# Patient Record
Sex: FEMALE | Race: WHITE | NOT HISPANIC OR LATINO | Employment: OTHER | URBAN - NONMETROPOLITAN AREA
[De-identification: names, ages, dates, MRNs, and addresses within clinical notes are randomized per-mention and may not be internally consistent; named-entity substitution may affect disease eponyms.]

---

## 2021-05-03 ENCOUNTER — APPOINTMENT (EMERGENCY)
Dept: CT IMAGING | Facility: HOSPITAL | Age: 81
DRG: 177 | End: 2021-05-03
Payer: COMMERCIAL

## 2021-05-03 ENCOUNTER — HOSPITAL ENCOUNTER (INPATIENT)
Facility: HOSPITAL | Age: 81
LOS: 4 days | Discharge: HOME HEALTH | DRG: 177 | End: 2021-05-07
Attending: EMERGENCY MEDICINE | Admitting: STUDENT IN AN ORGANIZED HEALTH CARE EDUCATION/TRAINING PROGRAM
Payer: COMMERCIAL

## 2021-05-03 ENCOUNTER — OFFICE VISIT (OUTPATIENT)
Dept: URGENT CARE | Facility: CLINIC | Age: 81
End: 2021-05-03
Payer: COMMERCIAL

## 2021-05-03 ENCOUNTER — APPOINTMENT (EMERGENCY)
Dept: RADIOLOGY | Facility: HOSPITAL | Age: 81
DRG: 177 | End: 2021-05-03
Payer: COMMERCIAL

## 2021-05-03 VITALS
WEIGHT: 170 LBS | RESPIRATION RATE: 28 BRPM | OXYGEN SATURATION: 94 % | HEART RATE: 75 BPM | HEIGHT: 64 IN | TEMPERATURE: 97.8 F | BODY MASS INDEX: 29.02 KG/M2

## 2021-05-03 DIAGNOSIS — R06.00 DYSPNEA, UNSPECIFIED TYPE: ICD-10-CM

## 2021-05-03 DIAGNOSIS — R09.02 HYPOXIA: ICD-10-CM

## 2021-05-03 DIAGNOSIS — U07.1 COVID-19: ICD-10-CM

## 2021-05-03 DIAGNOSIS — R06.00 DYSPNEA: ICD-10-CM

## 2021-05-03 DIAGNOSIS — J12.82 PNEUMONIA DUE TO COVID-19 VIRUS: ICD-10-CM

## 2021-05-03 DIAGNOSIS — J18.9 PNEUMONITIS: ICD-10-CM

## 2021-05-03 DIAGNOSIS — U07.1 PNEUMONIA DUE TO COVID-19 VIRUS: ICD-10-CM

## 2021-05-03 DIAGNOSIS — R68.89 FLU-LIKE SYMPTOMS: Primary | ICD-10-CM

## 2021-05-03 DIAGNOSIS — R05.9 COUGH: Primary | ICD-10-CM

## 2021-05-03 PROBLEM — K21.9 GASTROESOPHAGEAL REFLUX DISEASE WITHOUT ESOPHAGITIS: Status: ACTIVE | Noted: 2021-05-03

## 2021-05-03 PROBLEM — E87.1 HYPONATREMIA: Status: ACTIVE | Noted: 2021-05-03

## 2021-05-03 PROBLEM — F41.9 ANXIETY: Status: ACTIVE | Noted: 2021-05-03

## 2021-05-03 PROBLEM — N17.9 AKI (ACUTE KIDNEY INJURY) (HCC): Status: ACTIVE | Noted: 2021-05-03

## 2021-05-03 PROBLEM — I10 ESSENTIAL HYPERTENSION: Status: ACTIVE | Noted: 2021-05-03

## 2021-05-03 PROBLEM — R93.89 ABNORMAL FINDING ON CT SCAN: Status: ACTIVE | Noted: 2021-05-03

## 2021-05-03 LAB
ALBUMIN SERPL BCP-MCNC: 3.8 G/DL (ref 3.5–5)
ALP SERPL-CCNC: 65 U/L (ref 46–116)
ALT SERPL W P-5'-P-CCNC: 16 U/L (ref 12–78)
ANION GAP SERPL CALCULATED.3IONS-SCNC: 11 MMOL/L (ref 4–13)
AST SERPL W P-5'-P-CCNC: 27 U/L (ref 5–45)
BASOPHILS # BLD AUTO: 0.04 THOUSANDS/ΜL (ref 0–0.1)
BASOPHILS NFR BLD AUTO: 1 % (ref 0–1)
BILIRUB SERPL-MCNC: 0.65 MG/DL (ref 0.2–1)
BUN SERPL-MCNC: 37 MG/DL (ref 5–25)
CALCIUM SERPL-MCNC: 9 MG/DL (ref 8.3–10.1)
CHLORIDE SERPL-SCNC: 101 MMOL/L (ref 100–108)
CK MB SERPL-MCNC: 1.1 NG/ML (ref 0–5)
CK MB SERPL-MCNC: <1 % (ref 0–2.5)
CK SERPL-CCNC: 207 U/L (ref 26–192)
CO2 SERPL-SCNC: 22 MMOL/L (ref 21–32)
CREAT SERPL-MCNC: 1.85 MG/DL (ref 0.6–1.3)
CRP SERPL QL: 43 MG/L
D DIMER PPP FEU-MCNC: 1.12 UG/ML FEU
EOSINOPHIL # BLD AUTO: 0.01 THOUSAND/ΜL (ref 0–0.61)
EOSINOPHIL NFR BLD AUTO: 0 % (ref 0–6)
ERYTHROCYTE [DISTWIDTH] IN BLOOD BY AUTOMATED COUNT: 13.9 % (ref 11.6–15.1)
FLUAV RNA NPH QL NAA+PROBE: NORMAL
FLUBV RNA NPH QL NAA+PROBE: NORMAL
GFR SERPL CREATININE-BSD FRML MDRD: 25 ML/MIN/1.73SQ M
GLUCOSE SERPL-MCNC: 106 MG/DL (ref 65–140)
HCT VFR BLD AUTO: 38.5 % (ref 34.8–46.1)
HGB BLD-MCNC: 11.9 G/DL (ref 11.5–15.4)
IMM GRANULOCYTES # BLD AUTO: 0.07 THOUSAND/UL (ref 0–0.2)
IMM GRANULOCYTES NFR BLD AUTO: 1 % (ref 0–2)
LACTATE SERPL-SCNC: 1.5 MMOL/L (ref 0.5–2)
LYMPHOCYTES # BLD AUTO: 2.74 THOUSANDS/ΜL (ref 0.6–4.47)
LYMPHOCYTES NFR BLD AUTO: 33 % (ref 14–44)
MCH RBC QN AUTO: 29 PG (ref 26.8–34.3)
MCHC RBC AUTO-ENTMCNC: 30.9 G/DL (ref 31.4–37.4)
MCV RBC AUTO: 94 FL (ref 82–98)
MONOCYTES # BLD AUTO: 0.83 THOUSAND/ΜL (ref 0.17–1.22)
MONOCYTES NFR BLD AUTO: 10 % (ref 4–12)
NEUTROPHILS # BLD AUTO: 4.52 THOUSANDS/ΜL (ref 1.85–7.62)
NEUTS SEG NFR BLD AUTO: 55 % (ref 43–75)
NRBC BLD AUTO-RTO: 0 /100 WBCS
NT-PROBNP SERPL-MCNC: 2080 PG/ML
PLATELET # BLD AUTO: 179 THOUSANDS/UL (ref 149–390)
PMV BLD AUTO: 11.9 FL (ref 8.9–12.7)
POTASSIUM SERPL-SCNC: 4.8 MMOL/L (ref 3.5–5.3)
PROT SERPL-MCNC: 7.6 G/DL (ref 6.4–8.2)
RBC # BLD AUTO: 4.11 MILLION/UL (ref 3.81–5.12)
RSV RNA NPH QL NAA+PROBE: NORMAL
SARS-COV-2 RNA RESP QL NAA+PROBE: POSITIVE
SODIUM SERPL-SCNC: 134 MMOL/L (ref 136–145)
TROPONIN I SERPL-MCNC: 0.02 NG/ML
WBC # BLD AUTO: 8.21 THOUSAND/UL (ref 4.31–10.16)

## 2021-05-03 PROCEDURE — 99223 1ST HOSP IP/OBS HIGH 75: CPT | Performed by: NURSE PRACTITIONER

## 2021-05-03 PROCEDURE — XW033E5 INTRODUCTION OF REMDESIVIR ANTI-INFECTIVE INTO PERIPHERAL VEIN, PERCUTANEOUS APPROACH, NEW TECHNOLOGY GROUP 5: ICD-10-PCS | Performed by: FAMILY MEDICINE

## 2021-05-03 PROCEDURE — U0003 INFECTIOUS AGENT DETECTION BY NUCLEIC ACID (DNA OR RNA); SEVERE ACUTE RESPIRATORY SYNDROME CORONAVIRUS 2 (SARS-COV-2) (CORONAVIRUS DISEASE [COVID-19]), AMPLIFIED PROBE TECHNIQUE, MAKING USE OF HIGH THROUGHPUT TECHNOLOGIES AS DESCRIBED BY CMS-2020-01-R: HCPCS | Performed by: EMERGENCY MEDICINE

## 2021-05-03 PROCEDURE — U0005 INFEC AGEN DETEC AMPLI PROBE: HCPCS | Performed by: EMERGENCY MEDICINE

## 2021-05-03 PROCEDURE — 99285 EMERGENCY DEPT VISIT HI MDM: CPT

## 2021-05-03 PROCEDURE — 82550 ASSAY OF CK (CPK): CPT | Performed by: EMERGENCY MEDICINE

## 2021-05-03 PROCEDURE — 36415 COLL VENOUS BLD VENIPUNCTURE: CPT | Performed by: EMERGENCY MEDICINE

## 2021-05-03 PROCEDURE — 99285 EMERGENCY DEPT VISIT HI MDM: CPT | Performed by: EMERGENCY MEDICINE

## 2021-05-03 PROCEDURE — 85379 FIBRIN DEGRADATION QUANT: CPT | Performed by: EMERGENCY MEDICINE

## 2021-05-03 PROCEDURE — 93005 ELECTROCARDIOGRAM TRACING: CPT

## 2021-05-03 PROCEDURE — 99213 OFFICE O/P EST LOW 20 MIN: CPT | Performed by: EMERGENCY MEDICINE

## 2021-05-03 PROCEDURE — 86140 C-REACTIVE PROTEIN: CPT | Performed by: EMERGENCY MEDICINE

## 2021-05-03 PROCEDURE — 71275 CT ANGIOGRAPHY CHEST: CPT

## 2021-05-03 PROCEDURE — S9088 SERVICES PROVIDED IN URGENT: HCPCS | Performed by: EMERGENCY MEDICINE

## 2021-05-03 PROCEDURE — 84145 PROCALCITONIN (PCT): CPT | Performed by: EMERGENCY MEDICINE

## 2021-05-03 PROCEDURE — 83605 ASSAY OF LACTIC ACID: CPT | Performed by: EMERGENCY MEDICINE

## 2021-05-03 PROCEDURE — 84484 ASSAY OF TROPONIN QUANT: CPT | Performed by: EMERGENCY MEDICINE

## 2021-05-03 PROCEDURE — 82553 CREATINE MB FRACTION: CPT | Performed by: EMERGENCY MEDICINE

## 2021-05-03 PROCEDURE — 82728 ASSAY OF FERRITIN: CPT | Performed by: EMERGENCY MEDICINE

## 2021-05-03 PROCEDURE — 83880 ASSAY OF NATRIURETIC PEPTIDE: CPT | Performed by: EMERGENCY MEDICINE

## 2021-05-03 PROCEDURE — 71045 X-RAY EXAM CHEST 1 VIEW: CPT

## 2021-05-03 PROCEDURE — 80053 COMPREHEN METABOLIC PANEL: CPT | Performed by: EMERGENCY MEDICINE

## 2021-05-03 PROCEDURE — 85025 COMPLETE CBC W/AUTO DIFF WBC: CPT | Performed by: EMERGENCY MEDICINE

## 2021-05-03 PROCEDURE — 87631 RESP VIRUS 3-5 TARGETS: CPT | Performed by: EMERGENCY MEDICINE

## 2021-05-03 RX ORDER — LORAZEPAM 0.5 MG/1
TABLET ORAL
COMMUNITY

## 2021-05-03 RX ORDER — MULTIVITAMIN/IRON/FOLIC ACID 18MG-0.4MG
1 TABLET ORAL DAILY
Status: DISCONTINUED | OUTPATIENT
Start: 2021-05-11 | End: 2021-05-07 | Stop reason: HOSPADM

## 2021-05-03 RX ORDER — ASCORBIC ACID 500 MG
1000 TABLET ORAL EVERY 12 HOURS SCHEDULED
Status: DISCONTINUED | OUTPATIENT
Start: 2021-05-03 | End: 2021-05-07 | Stop reason: HOSPADM

## 2021-05-03 RX ORDER — OMEPRAZOLE 40 MG/1
40 CAPSULE, DELAYED RELEASE ORAL DAILY
COMMUNITY

## 2021-05-03 RX ORDER — ACETAMINOPHEN 325 MG/1
650 TABLET ORAL EVERY 6 HOURS PRN
Status: DISCONTINUED | OUTPATIENT
Start: 2021-05-03 | End: 2021-05-07 | Stop reason: HOSPADM

## 2021-05-03 RX ORDER — ZINC SULFATE 50(220)MG
220 CAPSULE ORAL DAILY
Status: DISCONTINUED | OUTPATIENT
Start: 2021-05-04 | End: 2021-05-07 | Stop reason: HOSPADM

## 2021-05-03 RX ORDER — ATORVASTATIN CALCIUM 20 MG/1
20 TABLET, FILM COATED ORAL DAILY
Status: DISCONTINUED | OUTPATIENT
Start: 2021-05-04 | End: 2021-05-07 | Stop reason: HOSPADM

## 2021-05-03 RX ORDER — TRIAMTERENE AND HYDROCHLOROTHIAZIDE 37.5; 25 MG/1; MG/1
1 TABLET ORAL DAILY
COMMUNITY

## 2021-05-03 RX ORDER — HEPARIN SODIUM 5000 [USP'U]/ML
5000 INJECTION, SOLUTION INTRAVENOUS; SUBCUTANEOUS EVERY 8 HOURS SCHEDULED
Status: DISCONTINUED | OUTPATIENT
Start: 2021-05-03 | End: 2021-05-07 | Stop reason: HOSPADM

## 2021-05-03 RX ORDER — HEPARIN SODIUM 5000 [USP'U]/ML
5000 INJECTION, SOLUTION INTRAVENOUS; SUBCUTANEOUS EVERY 8 HOURS SCHEDULED
Status: DISCONTINUED | OUTPATIENT
Start: 2021-05-03 | End: 2021-05-03

## 2021-05-03 RX ORDER — VENLAFAXINE HYDROCHLORIDE 37.5 MG/1
37.5 CAPSULE, EXTENDED RELEASE ORAL DAILY
COMMUNITY

## 2021-05-03 RX ORDER — NADOLOL 40 MG/1
40 TABLET ORAL DAILY
COMMUNITY
End: 2021-05-07 | Stop reason: HOSPADM

## 2021-05-03 RX ORDER — NADOLOL 20 MG/1
40 TABLET ORAL DAILY
Status: DISCONTINUED | OUTPATIENT
Start: 2021-05-04 | End: 2021-05-06

## 2021-05-03 RX ORDER — LORAZEPAM 0.5 MG/1
0.5 TABLET ORAL
Status: DISCONTINUED | OUTPATIENT
Start: 2021-05-03 | End: 2021-05-07 | Stop reason: HOSPADM

## 2021-05-03 RX ORDER — ACETAMINOPHEN 325 MG/1
650 TABLET ORAL EVERY 6 HOURS PRN
COMMUNITY

## 2021-05-03 RX ORDER — MELATONIN
2000 DAILY
Status: DISCONTINUED | OUTPATIENT
Start: 2021-05-04 | End: 2021-05-07 | Stop reason: HOSPADM

## 2021-05-03 RX ORDER — ENALAPRIL MALEATE 10 MG/1
10 TABLET ORAL DAILY
COMMUNITY

## 2021-05-03 RX ORDER — FLUTICASONE PROPIONATE 50 MCG
1 SPRAY, SUSPENSION (ML) NASAL DAILY
COMMUNITY

## 2021-05-03 RX ORDER — ATORVASTATIN CALCIUM 20 MG/1
20 TABLET, FILM COATED ORAL DAILY
COMMUNITY

## 2021-05-03 RX ORDER — ACETAMINOPHEN 325 MG/1
650 TABLET ORAL EVERY 6 HOURS PRN
Status: DISCONTINUED | OUTPATIENT
Start: 2021-05-03 | End: 2021-05-03

## 2021-05-03 RX ORDER — FLUTICASONE PROPIONATE 50 MCG
1 SPRAY, SUSPENSION (ML) NASAL DAILY
Status: DISCONTINUED | OUTPATIENT
Start: 2021-05-04 | End: 2021-05-07 | Stop reason: HOSPADM

## 2021-05-03 RX ORDER — SODIUM CHLORIDE 9 MG/ML
50 INJECTION, SOLUTION INTRAVENOUS CONTINUOUS
Status: DISCONTINUED | OUTPATIENT
Start: 2021-05-03 | End: 2021-05-05

## 2021-05-03 RX ORDER — VENLAFAXINE HYDROCHLORIDE 37.5 MG/1
37.5 CAPSULE, EXTENDED RELEASE ORAL DAILY
Status: DISCONTINUED | OUTPATIENT
Start: 2021-05-04 | End: 2021-05-07 | Stop reason: HOSPADM

## 2021-05-03 RX ADMIN — IODIXANOL 85 ML: 320 INJECTION, SOLUTION INTRAVASCULAR at 21:06

## 2021-05-03 RX ADMIN — SODIUM CHLORIDE 500 ML: 0.9 INJECTION, SOLUTION INTRAVENOUS at 21:07

## 2021-05-03 NOTE — PATIENT INSTRUCTIONS
Dyspnea   WHAT YOU NEED TO KNOW:   Dyspnea is breathing difficulty or discomfort  You may have labored, painful, or shallow breathing  You may feel breathless or short of breath  Dyspnea can occur during rest or with activity  You may have dyspnea for a short time, or it might become chronic  Dyspnea is often a symptom of a disease or condition  DISCHARGE INSTRUCTIONS:   Return to the emergency department if:   · Your signs and symptoms are the same or worse within 24 hours of treatment  · You have shaking chills or a fever over 102°F      · You have new pain, pressure, or tightness in your chest      · You have a new or worse cough or wheezing, or you cough up blood  · You feel like you cannot get enough air  · The skin over your ribs or on your neck sinks in when you breathe  · You have a severe headache with vomiting and abdominal pain  · You feel confused or dizzy  Contact your healthcare provider or specialist if:   · You have questions or concerns about your condition or care  Medicines:   · Medicines  may be used to treat the cause of your dyspnea  Medicines may reduce swelling in your airway or decrease extra fluid from around your heart or lungs  Other medicines may be used to decrease anxiety and help you feel calm and relaxed  · Take your medicine as directed  Contact your healthcare provider if you think your medicine is not helping or if you have side effects  Tell him or her if you are allergic to any medicine  Keep a list of the medicines, vitamins, and herbs you take  Include the amounts, and when and why you take them  Bring the list or the pill bottles to follow-up visits  Carry your medicine list with you in case of an emergency  Manage long-term dyspnea:   · Create an action plan  You and your healthcare provider can work together to create a plan for how to handle episodes of dyspnea   The plan can include daily activities, treatment changes, and what to do if you have severe breathing problems  · Lean forward on your elbows when you sit  This helps your lungs expand and may make it easier to breathe  · Use pursed-lip breathing any time you feel short of breath  Breathe in through your nose and then slowly breathe out through your mouth with your lips slightly puckered  It should take you twice as long to breathe out as it did to breathe in  · Do not smoke  Nicotine and other chemicals in cigarettes and cigars can cause lung damage and make it harder to breathe  Ask your healthcare provider for information if you currently smoke and need help to quit  E-cigarettes or smokeless tobacco still contain nicotine  Talk to your healthcare provider before you use these products  · Reach or maintain a healthy weight  Your healthcare provider can help you create a safe weight loss plan if you are overweight  · Exercise as directed  Exercise can help your lungs work more easily  Exercise can also help you lose weight if needed  Try to get at least 30 minutes of exercise most days of the week  Your healthcare provider can help you create an exercise plan that is safe for you  Follow up with your healthcare provider or specialist as directed:  Write down your questions so you remember to ask them during your visits  © Copyright [address] Information is for End User's use only and may not be sold, redistributed or otherwise used for commercial purposes  All illustrations and images included in CareNotes® are the copyrighted property of A D A M , Inc  or [facility] Basilio Roman   The above information is an  only  It is not intended as medical advice for individual conditions or treatments  Talk to your doctor, nurse or pharmacist before following any medical regimen to see if it is safe and effective for you

## 2021-05-03 NOTE — ED PROVIDER NOTES
History  Chief Complaint   Patient presents with    Cough     pt began with a cough last wednesday and diarrhea  states her whole family had covid     Patient is very pleasant 73-year-old female presents emergency room with a chief complaint congestion weakness ongoing since about Thursday and getting progressively worse for the weekend  She went to urgent care and was referred to the emergency room today for evaluation  Patient reports that majority of her family members at her in the household with her have been tested positive for COVID  Patient reports some weakness  She also reports decreased at appetite and some nausea  She has had episode of to her foot she describes as dry heaves  She reports that she has chronic myalgias which seemed to be worse since Thursday  Patient reports a low-grade fever of 99  History provided by:  Patient  Cough  Cough characteristics:  Non-productive  Sputum characteristics:  Nondescript  Severity:  Moderate  Onset quality:  Gradual  Duration:  5 days  Timing:  Intermittent  Progression:  Waxing and waning  Chronicity:  New  Worsened by: Activity and deep breathing  Ineffective treatments:  None tried  Associated symptoms: fever (99 9) and shortness of breath    Associated symptoms: no chest pain, no chills, no diaphoresis, no ear pain, no headaches, no myalgias, no rash, no rhinorrhea, no sore throat and no wheezing    Fever:     Temp source:  Subjective      Prior to Admission Medications   Prescriptions Last Dose Informant Patient Reported? Taking?    LORazepam (ATIVAN) 0 5 mg tablet   Yes No   Sig: Take by mouth daily at bedtime   Probiotic Product (PROBIOTIC-10 PO)   Yes No   Sig: Take by mouth   acetaminophen (TYLENOL) 325 mg tablet   Yes No   Sig: Take 650 mg by mouth every 6 (six) hours as needed for mild pain   atorvastatin (LIPITOR) 20 mg tablet   Yes No   Sig: Take 20 mg by mouth daily   enalapril (VASOTEC) 10 mg tablet   Yes No   Sig: Take 10 mg by mouth daily   fluticasone (FLONASE) 50 mcg/act nasal spray   Yes No   Si spray into each nostril daily   nadolol (CORGARD) 40 mg tablet   Yes No   Sig: Take 40 mg by mouth daily   omeprazole (PriLOSEC) 40 MG capsule   Yes No   Sig: Take 40 mg by mouth daily   triamterene-hydrochlorothiazide (MAXZIDE-25) 37 5-25 mg per tablet   Yes No   Sig: Take 1 tablet by mouth daily   venlafaxine (EFFEXOR-XR) 37 5 mg 24 hr capsule   Yes No   Sig: Take 37 5 mg by mouth daily      Facility-Administered Medications: None       Past Medical History:   Diagnosis Date    Arthritis     GERD (gastroesophageal reflux disease)     Hypertension        Past Surgical History:   Procedure Laterality Date    NO PAST SURGERIES      SINUS SURGERY         History reviewed  No pertinent family history  I have reviewed and agree with the history as documented  E-Cigarette/Vaping    E-Cigarette Use Never User      E-Cigarette/Vaping Substances     Social History     Tobacco Use    Smoking status: Never Smoker    Smokeless tobacco: Never Used   Substance Use Topics    Alcohol use: Not Currently    Drug use: Never       Review of Systems   Constitutional: Positive for activity change, appetite change and fever (99 9)  Negative for chills, diaphoresis and fatigue  HENT: Positive for sinus pressure  Negative for congestion, ear pain, rhinorrhea, sinus pain and sore throat  No new change in smell or taste   Eyes: Negative  Negative for redness and itching  Respiratory: Positive for cough and shortness of breath  Negative for chest tightness and wheezing  Cardiovascular: Negative for chest pain, palpitations and leg swelling  Gastrointestinal: Positive for diarrhea and vomiting  Negative for abdominal pain and nausea  Endocrine: Negative  Genitourinary: Negative for dysuria, flank pain and frequency  Musculoskeletal: Positive for arthralgias and back pain  Negative for myalgias  Skin: Negative    Negative for pallor and rash  Allergic/Immunologic: Negative  Neurological: Positive for weakness  Negative for dizziness, syncope and headaches  Hematological: Negative  Psychiatric/Behavioral: Negative  All other systems reviewed and are negative  Physical Exam  Physical Exam  Vitals signs and nursing note reviewed  Constitutional:       General: She is awake  She is not in acute distress  Appearance: Normal appearance  She is well-developed and normal weight  She is not ill-appearing or toxic-appearing  HENT:      Head: Normocephalic and atraumatic  Hair is normal       Jaw: No pain on movement  Right Ear: External ear normal       Left Ear: External ear normal       Nose: Nose normal       Mouth/Throat:      Mouth: Mucous membranes are moist       Pharynx: Oropharynx is clear  Eyes:      General: Lids are normal  No scleral icterus  Extraocular Movements: Extraocular movements intact  Conjunctiva/sclera: Conjunctivae normal       Pupils: Pupils are equal, round, and reactive to light  Neck:      Musculoskeletal: Normal range of motion and neck supple  Cardiovascular:      Rate and Rhythm: Normal rate and regular rhythm  Pulses: Normal pulses  Heart sounds: Normal heart sounds  No murmur  Pulmonary:      Effort: Pulmonary effort is normal  No respiratory distress  Breath sounds: Normal breath sounds  No stridor  No wheezing, rhonchi or rales  Abdominal:      General: Abdomen is flat  There is no distension  Palpations: Abdomen is soft  Tenderness: There is no abdominal tenderness  There is no guarding  Musculoskeletal: Normal range of motion  General: No deformity  Skin:     General: Skin is warm and dry  Coloration: Skin is not jaundiced or pale  Findings: No bruising, lesion or rash  Neurological:      General: No focal deficit present  Mental Status: She is alert and oriented to person, place, and time   Mental status is at baseline  Cranial Nerves: No cranial nerve deficit  Motor: No weakness  Psychiatric:         Attention and Perception: Attention normal          Mood and Affect: Mood normal          Speech: Speech normal          Behavior: Behavior normal          Thought Content:  Thought content normal          Judgment: Judgment normal          Vital Signs  ED Triage Vitals   Temperature Pulse Respirations Blood Pressure SpO2   05/03/21 1858 05/03/21 1858 05/03/21 1858 05/03/21 1858 05/03/21 1858   100 °F (37 8 °C) 66 20 (!) 186/77 97 %      Temp Source Heart Rate Source Patient Position - Orthostatic VS BP Location FiO2 (%)   05/03/21 1858 05/03/21 2114 05/03/21 1858 05/03/21 1858 --   Temporal Monitor Lying Right arm       Pain Score       05/03/21 2315       No Pain           Vitals:    05/03/21 1858 05/03/21 2114 05/03/21 2200 05/03/21 2243   BP: (!) 186/77 165/76 (!) 185/76 168/76   Pulse: 66 66 65 73   Patient Position - Orthostatic VS: Lying Lying Sitting          Visual Acuity      ED Medications  Medications   atorvastatin (LIPITOR) tablet 20 mg (has no administration in time range)   nadolol (CORGARD) tablet 40 mg (has no administration in time range)   venlafaxine (EFFEXOR-XR) 24 hr capsule 37 5 mg (has no administration in time range)   LORazepam (ATIVAN) tablet 0 5 mg (has no administration in time range)   fluticasone (FLONASE) 50 mcg/act nasal spray 1 spray (has no administration in time range)   heparin (porcine) subcutaneous injection 5,000 Units (has no administration in time range)   cholecalciferol (VITAMIN D3) tablet 2,000 Units (has no administration in time range)   ascorbic acid (VITAMIN C) tablet 1,000 mg (has no administration in time range)   zinc sulfate (ZINCATE) capsule 220 mg (has no administration in time range)     Followed by   multivitamin-minerals (CENTRUM ADULTS) tablet 1 tablet (has no administration in time range)   remdesivir (Veklury) 200 mg in sodium chloride 0 9 % 250 mL IVPB (has no administration in time range)     Followed by   remdesivir Perez Cohens) 100 mg in sodium chloride 0 9 % 250 mL IVPB (has no administration in time range)   acetaminophen (TYLENOL) tablet 650 mg (has no administration in time range)   sodium chloride 0 9 % infusion (has no administration in time range)   doxycycline (VIBRAMYCIN) 100 mg in sodium chloride 0 9 % 100 mL IVPB (has no administration in time range)   sodium chloride 0 9 % bolus 500 mL (500 mL Intravenous New Bag 5/3/21 2107)   iodixanol (VISIPAQUE) 320 MG/ML injection 85 mL (85 mL Intravenous Given 5/3/21 2106)       Diagnostic Studies  Results Reviewed     Procedure Component Value Units Date/Time    Procalcitonin [[id number]] Collected: 05/03/21 2205    Lab Status: In process Specimen: Blood from Arm, Left Updated: 05/03/21 2220    CKMB [[id number]]  (Normal) Collected: 05/03/21 1926    Lab Status: Final result Specimen: Blood from Arm, Left Updated: 05/03/21 2213     CK-MB Index <1 0 %      CK-MB 1 1 ng/mL     C-reactive protein [[id number]]  (Abnormal) Collected: 05/03/21 1926    Lab Status: Final result Specimen: Blood from Arm, Left Updated: 05/03/21 2209     CRP 43 0 mg/L     CK [[id number]]  (Abnormal) Collected: 05/03/21 1926    Lab Status: Final result Specimen: Blood from Arm, Left Updated: 05/03/21 2146     Total  U/L     Ferritin [[id number]] Collected: 05/03/21 1926    Lab Status: In process Specimen: Blood from Arm, Left Updated: 05/03/21 2134    Novel Coronavirus (Covid-19),PCR [facility] [[id number]]  (Abnormal) Collected: 05/03/21 1926    Lab Status: Final result Specimen: Nares from Nose Updated: 05/03/21 2032     SARS-CoV-2 Positive    Narrative: The specimen collection materials, transport medium, and/or testing methodology utilized in the production of these test results have been proven to be reliable in a limited validation with an abbreviated program under the Emergency Utilization Authorization provided by the FDA  Testing reported as "Presumptive positive" will be confirmed with secondary testing to ensure result accuracy  Clinical caution and judgement should be used with the interpretation of these results with consideration of the clinical impression and other laboratory testing  Testing reported as "Positive" or "Negative" has been proven to be accurate according to standard laboratory validation requirements  All testing is performed with control materials showing appropriate reactivity at standard intervals  Influenza A/B and RSV PCR [[id number]]  (Normal) Collected: 05/03/21 1926    Lab Status: Final result Specimen: Nares from Nose Updated: 05/03/21 2024     INFLUENZA A PCR None Detected     INFLUENZA B PCR None Detected     RSV PCR None Detected    NT-BNP PRO [[id number]]  (Abnormal) Collected: 05/03/21 1926    Lab Status: Final result Specimen: Blood from Arm, Left Updated: 05/03/21 1957     NT-proBNP 2,080 pg/mL     Lactic acid [[id number]]  (Normal) Collected: 05/03/21 1926    Lab Status: Final result Specimen: Blood from Arm, Left Updated: 05/03/21 1955     LACTIC ACID 1 5 mmol/L     Narrative:      Result may be elevated if tourniquet was used during collection      Troponin I [[id number]]  (Normal) Collected: 05/03/21 1926    Lab Status: Final result Specimen: Blood from Arm, Left Updated: 05/03/21 1953     Troponin I 0 02 ng/mL     D-Dimer [[id number]]  (Abnormal) Collected: 05/03/21 1926    Lab Status: Final result Specimen: Blood from Arm, Left Updated: 05/03/21 1953     D-Dimer, Quant 1 12 ug/ml [facility]     Comprehensive metabolic panel [[id number]]  (Abnormal) Collected: 05/03/21 1926    Lab Status: Final result Specimen: Blood from Arm, Left Updated: 05/03/21 1952     Sodium 134 mmol/L      Potassium 4 8 mmol/L      Chloride 101 mmol/L      CO2 22 mmol/L      ANION GAP 11 mmol/L      BUN 37 mg/dL      Creatinine 1 85 mg/dL      Glucose 106 mg/dL      Calcium 9 0 mg/dL      AST 27 U/L      ALT 16 U/L Alkaline Phosphatase 65 U/L      Total Protein 7 6 g/dL      Albumin 3 8 g/dL      Total Bilirubin 0 65 mg/dL      eGFR 25 ml/min/1 73sq m     Narrative:      National Kidney Disease Foundation guidelines for Chronic Kidney Disease (CKD):     Stage 1 with normal or high GFR (GFR > 90 mL/min/1 73 square meters)    Stage 2 Mild CKD (GFR = 60-89 mL/min/1 73 square meters)    Stage 3A Moderate CKD (GFR = 45-59 mL/min/1 73 square meters)    Stage 3B Moderate CKD (GFR = 30-44 mL/min/1 73 square meters)    Stage 4 Severe CKD (GFR = 15-29 mL/min/1 73 square meters)    Stage 5 End Stage CKD (GFR <15 mL/min/1 73 square meters)  Note: GFR calculation is accurate only with a steady state creatinine    CBC and differential [[id number]]  (Abnormal) Collected: 05/03/21 1926    Lab Status: Final result Specimen: Blood from Arm, Left Updated: 05/03/21 1934     WBC 8 21 Thousand/uL      RBC 4 11 Million/uL      Hemoglobin 11 9 g/dL      Hematocrit 38 5 %      MCV 94 fL      MCH 29 0 pg      MCHC 30 9 g/dL      RDW 13 9 %      MPV 11 9 fL      Platelets 268 Thousands/uL      nRBC 0 /100 WBCs      Neutrophils Relative 55 %      Immat GRANS % 1 %      Lymphocytes Relative 33 %      Monocytes Relative 10 %      Eosinophils Relative 0 %      Basophils Relative 1 %      Neutrophils Absolute 4 52 Thousands/µL      Immature Grans Absolute 0 07 Thousand/uL      Lymphocytes Absolute 2 74 Thousands/µL      Monocytes Absolute 0 83 Thousand/µL      Eosinophils Absolute 0 01 Thousand/µL      Basophils Absolute 0 04 Thousands/µL                  CTA ED chest PE Study   Final Result by Nat Nixon MD (05/03 2118)         1  Extensive multifocal groundglass opacities correlating to the history of Covid-19 pneumonia/pneumonitis  2   Scattered splenic, hepatic and bilateral hilar calcified granulomata  3   Cholelithiasis  4   No pulmonary embolism                    Workstation performed: [id number]         XR chest 1 view portable (Results Pending)              Procedures  ECG 12 Lead Documentation Only    Date/Time: 5/3/2021 7:46 PM  Performed by: Harvey Funez DO  Authorized by: Harvey Funez DO     Indications / Diagnosis:  Shortness of breath  ECG reviewed by me, the ED Provider: yes    Patient location:  ED  Previous ECG:     Previous ECG:  Unavailable  Interpretation:     Interpretation: normal    Rate:     ECG rate assessment: normal    Rhythm:     Rhythm: sinus rhythm    Ectopy:     Ectopy: none    QRS:     QRS axis:  Normal  Conduction:     Conduction: abnormal      Abnormal conduction: non-specific intraventricular conduction delay    ST segments:     ST segments:  Non-specific    Elevation:  III  T waves:     T waves: non-specific    Q waves:     Q waves: I  Comments:      Review of the records from [facility] indicate that there was abnormality in the inferior leads previously noted  I do not have the specific tracings to compare to at this time  ED Course  ED Course as of May 03 2342   Mon May 03, 2021   2028 Elevated D-dimer in a patient that has had exposure to [place]  Cannot be explained by age adjustment  D-Dimer, Quant(!): 1 12 2031 NT-proBNP(!): 2,080   2032 SARS-COV-2(!): Positive   2119 Spoke to patient's daughter and updated her regarding the patient's results  Daughter also reports to me that she has been monitoring her mother's pulse oximetry at home since other family members had tested positive for COVID and it was presumed that her mother had COVID as well  She reports to me that the mother's a pulse oximetry today with ambulation was 87%  2121 Extensive pneumonitis on the CTA of the chest consistent with COVID-19  No evidence of pulmonary embolism  2124 Spoke to hospitalist and patient will be admitted to their service                                      Nelda Buckner' Criteria for PE      Most Recent Value   Wells' Criteria for PE   Clinical signs and symptoms of DVT  0 Filed at: 05/03/2021 2030   PE is primary diagnosis or equally likely  3 Filed at: 05/03/2021 2030   HR >100  0 Filed at: 05/03/2021 2030   Immobilization at least 3 days or Surgery in the previous 4 weeks  0 Filed at: 05/03/2021 2030   Previous, objectively diagnosed PE or DVT  0 Filed at: 05/03/2021 2030   Hemoptysis  0 Filed at: 05/03/2021 2030   Malignancy with treatment within 6 months or palliative  0 Filed at: 05/03/2021 2030   Chris De La Torre' Criteria Total  3 Filed at: 05/03/2021 2030                MDM  Number of Diagnoses or Management Options  Cough: new and requires workup  COVID-19: new and requires workup  Dyspnea: new and requires workup  Hypoxia: new and requires workup  Pneumonitis: new and requires workup  Diagnosis management comments: Patient presented to the emergency department and a MSE was performed  The patient was evaluated and diagnosed with acute hypoxia and dyspnea and COVID-19 pneumonitis  There is also possibility of congestive heart failure as evidenced by an elevated BNP  However, this needs to be further confirmed as this could also be elevated secondary to the patient's COVID 19 causing some cardiomyopathy  This is a new issue that will require additional planned work-up and treatment in a hospitalized setting  As may have been required as part of this evaluation, clinical laboratory test, radiology imaging and medical testing (I e  EKG) were ordered as necessitated by the patient's presentation  I independently reviewed these studies, imaging and testing  This patient's case is considered to be a considerable risk secondary to the above listed disease process and poses a threat to the patient's well-being and baseline function  Further in-patient diagnostic testing and management, which may include the administration of parenteral medications, is required           Amount and/or Complexity of Data Reviewed  Clinical lab tests: ordered and reviewed  Tests in the radiology section of CPT®: ordered and reviewed  Obtain history from someone other than the patient: yes  Discuss the patient with other providers: yes    Risk of Complications, Morbidity, and/or Mortality  Presenting problems: high  Diagnostic procedures: high  Management options: moderate    Patient Progress  Patient progress: improved      Disposition  Final diagnoses:   Cough   Dyspnea   COVID-19   Hypoxia   Pneumonitis     Time reflects when diagnosis was documented in both MDM as applicable and the Disposition within this note     Time User Action Codes Description Comment    5/3/2021  8:33 PM [place] Add [R05] Cough     5/3/2021  8:33 PM [place] Add [R06 00] Dyspnea     5/3/2021  8:33 PM [place] Add [U07 1] COVID-19     5/3/2021  9:20 PM [place] Add [R09 02] Hypoxia     5/3/2021  9:26 PM [place] Add [J18 9] Pneumonitis       ED Disposition     ED Disposition Condition Date/Time Comment    Admit Stable Mon May 3, 2021  9:25 PM         Follow-up Information    None         Current Discharge Medication List      CONTINUE these medications which have NOT CHANGED    Details   acetaminophen (TYLENOL) 325 mg tablet Take 650 mg by mouth every 6 (six) hours as needed for mild pain      atorvastatin (LIPITOR) 20 mg tablet Take 20 mg by mouth daily      enalapril (VASOTEC) 10 mg tablet Take 10 mg by mouth daily      fluticasone (FLONASE) 50 mcg/act nasal spray 1 spray into each nostril daily      LORazepam (ATIVAN) 0 5 mg tablet Take by mouth daily at bedtime      nadolol (CORGARD) 40 mg tablet Take 40 mg by mouth daily      omeprazole (PriLOSEC) 40 MG capsule Take 40 mg by mouth daily      Probiotic Product (PROBIOTIC-10 PO) Take by mouth      triamterene-hydrochlorothiazide (MAXZIDE-25) 37 5-25 mg per tablet Take 1 tablet by mouth daily      venlafaxine (EFFEXOR-XR) 37 5 mg 24 hr capsule Take 37 5 mg by mouth daily           No discharge procedures on file      PDMP Review     None          ED Provider  Electronically Signed by           Daryl Babb,   05/03/21 2355

## 2021-05-03 NOTE — PROGRESS NOTES
[email or web address]        NAME: Rush Tejada is a 78 y o  female  : 1940    MRN: [id number]  DATE: May 3, 2021  TIME: 5:23 PM    Assessment and Plan   Flu-like symptoms [R68 89]  1  Flu-like symptoms  Novel Coronavirus (Covid-19),PCR [facility] - Office Collection         Patient Instructions     There are no Patient Instructions on file for this visit  Follow up with PCP in 3-5 days  Proceed to  ER if symptoms worsen  Chief Complaint     Chief Complaint   Patient presents with    COVID-19     exposed 2+ weeks ago  coughing, phlem, hx sinus issues  daughter thinks breathing is labored  History of Present Illness       Patient with cough, congestion, labored breathing according to daughter worsening over the past few days     Patient had recent COVID exposure  She does not have history of underlying lung disease  Review of Systems   Review of Systems   Constitutional: Negative for chills and fever  HENT: Positive for congestion, rhinorrhea and sinus pressure  Negative for sore throat, trouble swallowing and voice change  Respiratory: Positive for cough  Negative for chest tightness, shortness of breath and wheezing  Cardiovascular: Negative for chest pain  Current Medications     No current outpatient medications on file  Current Allergies     Allergies as of 2021 - never reviewed   Allergen Reaction Noted    Amoxicillin Hives 2021    Sulfa antibiotics Hives 2021            The following portions of the patient's history were reviewed and updated as appropriate: allergies, current medications, past family history, past medical history, past social history, past surgical history and problem list      Past Medical History:   Diagnosis Date    Arthritis     GERD (gastroesophageal reflux disease)     Hypertension        Past Surgical History:   Procedure Laterality Date    NO PAST SURGERIES         No family history on file        Medications have been verified  Objective   Ht 5' 3 5" (1 613 m)   Wt 77 1 kg (170 lb)   BMI 29 64 kg/m²        Physical Exam     Physical Exam  Vitals signs and nursing note reviewed  Constitutional:       General: She is not in acute distress  Appearance: She is well-developed  HENT:      Head: Normocephalic and atraumatic  Nose: Mucosal edema present  Mouth/Throat:      Pharynx: Posterior oropharyngeal erythema present  No oropharyngeal exudate  Tonsils: No tonsillar abscesses  Neck:      Musculoskeletal: Neck supple  Cardiovascular:      Rate and Rhythm: Regular rhythm  Comments: Mild tachycardia  Pulmonary:      Effort: Pulmonary effort is normal  No respiratory distress  Breath sounds: No wheezing or rales  Abdominal:      General: Bowel sounds are normal       Palpations: Abdomen is soft  Skin:     General: Skin is warm and dry  Findings: No rash  Neurological:      Mental Status: She is alert and oriented to person, place, and time  Psychiatric:         Mood and Affect: Mood normal          Behavior: Behavior normal          Thought Content:  Thought content normal          Judgment: Judgment normal

## 2021-05-04 PROBLEM — A09 ACUTE INFECTIOUS DIARRHEA: Status: ACTIVE | Noted: 2021-05-04

## 2021-05-04 LAB
ABO GROUP BLD: NORMAL
ALBUMIN SERPL BCP-MCNC: 3.2 G/DL (ref 3.5–5)
ALP SERPL-CCNC: 54 U/L (ref 46–116)
ALT SERPL W P-5'-P-CCNC: 9 U/L (ref 12–78)
ANION GAP SERPL CALCULATED.3IONS-SCNC: 13 MMOL/L (ref 4–13)
AST SERPL W P-5'-P-CCNC: 21 U/L (ref 5–45)
ATRIAL RATE: 61 BPM
ATRIAL RATE: 63 BPM
BASOPHILS # BLD AUTO: 0.02 THOUSANDS/ΜL (ref 0–0.1)
BASOPHILS NFR BLD AUTO: 0 % (ref 0–1)
BILIRUB SERPL-MCNC: 0.53 MG/DL (ref 0.2–1)
BUN SERPL-MCNC: 38 MG/DL (ref 5–25)
CALCIUM ALBUM COR SERPL-MCNC: 8.8 MG/DL (ref 8.3–10.1)
CALCIUM SERPL-MCNC: 8.2 MG/DL (ref 8.3–10.1)
CHLORIDE SERPL-SCNC: 105 MMOL/L (ref 100–108)
CO2 SERPL-SCNC: 18 MMOL/L (ref 21–32)
CREAT SERPL-MCNC: 1.69 MG/DL (ref 0.6–1.3)
EOSINOPHIL # BLD AUTO: 0.03 THOUSAND/ΜL (ref 0–0.61)
EOSINOPHIL NFR BLD AUTO: 1 % (ref 0–6)
ERYTHROCYTE [DISTWIDTH] IN BLOOD BY AUTOMATED COUNT: 13.9 % (ref 11.6–15.1)
FERRITIN SERPL-MCNC: 240 NG/ML (ref 8–388)
GFR SERPL CREATININE-BSD FRML MDRD: 28 ML/MIN/1.73SQ M
GLUCOSE SERPL-MCNC: 99 MG/DL (ref 65–140)
HCT VFR BLD AUTO: 34.7 % (ref 34.8–46.1)
HGB BLD-MCNC: 10.8 G/DL (ref 11.5–15.4)
IMM GRANULOCYTES # BLD AUTO: 0.05 THOUSAND/UL (ref 0–0.2)
IMM GRANULOCYTES NFR BLD AUTO: 1 % (ref 0–2)
LYMPHOCYTES # BLD AUTO: 2.81 THOUSANDS/ΜL (ref 0.6–4.47)
LYMPHOCYTES NFR BLD AUTO: 47 % (ref 14–44)
MCH RBC QN AUTO: 28.9 PG (ref 26.8–34.3)
MCHC RBC AUTO-ENTMCNC: 31.1 G/DL (ref 31.4–37.4)
MCV RBC AUTO: 93 FL (ref 82–98)
MONOCYTES # BLD AUTO: 0.56 THOUSAND/ΜL (ref 0.17–1.22)
MONOCYTES NFR BLD AUTO: 9 % (ref 4–12)
NEUTROPHILS # BLD AUTO: 2.5 THOUSANDS/ΜL (ref 1.85–7.62)
NEUTS SEG NFR BLD AUTO: 42 % (ref 43–75)
NRBC BLD AUTO-RTO: 0 /100 WBCS
P AXIS: -2 DEGREES
P AXIS: 20 DEGREES
PLATELET # BLD AUTO: 139 THOUSANDS/UL (ref 149–390)
PMV BLD AUTO: 11.6 FL (ref 8.9–12.7)
POTASSIUM SERPL-SCNC: 4.4 MMOL/L (ref 3.5–5.3)
PR INTERVAL: 152 MS
PR INTERVAL: 156 MS
PROCALCITONIN SERPL-MCNC: 0.06 NG/ML
PROT SERPL-MCNC: 6.4 G/DL (ref 6.4–8.2)
QRS AXIS: 16 DEGREES
QRS AXIS: 21 DEGREES
QRSD INTERVAL: 74 MS
QRSD INTERVAL: 78 MS
QT INTERVAL: 430 MS
QT INTERVAL: 436 MS
QTC INTERVAL: 432 MS
QTC INTERVAL: 446 MS
RBC # BLD AUTO: 3.74 MILLION/UL (ref 3.81–5.12)
RH BLD: POSITIVE
SODIUM SERPL-SCNC: 136 MMOL/L (ref 136–145)
T WAVE AXIS: 64 DEGREES
T WAVE AXIS: 75 DEGREES
VENTRICULAR RATE: 61 BPM
VENTRICULAR RATE: 63 BPM
WBC # BLD AUTO: 5.97 THOUSAND/UL (ref 4.31–10.16)

## 2021-05-04 PROCEDURE — 99232 SBSQ HOSP IP/OBS MODERATE 35: CPT | Performed by: FAMILY MEDICINE

## 2021-05-04 PROCEDURE — 86901 BLOOD TYPING SEROLOGIC RH(D): CPT | Performed by: NURSE PRACTITIONER

## 2021-05-04 PROCEDURE — 86900 BLOOD TYPING SEROLOGIC ABO: CPT | Performed by: NURSE PRACTITIONER

## 2021-05-04 PROCEDURE — 80053 COMPREHEN METABOLIC PANEL: CPT | Performed by: NURSE PRACTITIONER

## 2021-05-04 PROCEDURE — 87040 BLOOD CULTURE FOR BACTERIA: CPT | Performed by: NURSE PRACTITIONER

## 2021-05-04 PROCEDURE — 85025 COMPLETE CBC W/AUTO DIFF WBC: CPT | Performed by: NURSE PRACTITIONER

## 2021-05-04 RX ORDER — BENZONATATE 100 MG/1
100 CAPSULE ORAL 3 TIMES DAILY PRN
Status: DISCONTINUED | OUTPATIENT
Start: 2021-05-04 | End: 2021-05-07 | Stop reason: HOSPADM

## 2021-05-04 RX ORDER — PANTOPRAZOLE SODIUM 40 MG/1
40 TABLET, DELAYED RELEASE ORAL
Status: DISCONTINUED | OUTPATIENT
Start: 2021-05-04 | End: 2021-05-07 | Stop reason: HOSPADM

## 2021-05-04 RX ORDER — CEFTRIAXONE 1 G/50ML
1000 INJECTION, SOLUTION INTRAVENOUS EVERY 24 HOURS
Status: DISCONTINUED | OUTPATIENT
Start: 2021-05-04 | End: 2021-05-05

## 2021-05-04 RX ORDER — ONDANSETRON 2 MG/ML
4 INJECTION INTRAMUSCULAR; INTRAVENOUS ONCE
Status: COMPLETED | OUTPATIENT
Start: 2021-05-04 | End: 2021-05-04

## 2021-05-04 RX ORDER — LOPERAMIDE HYDROCHLORIDE 2 MG/1
2 CAPSULE ORAL ONCE
Status: COMPLETED | OUTPATIENT
Start: 2021-05-04 | End: 2021-05-04

## 2021-05-04 RX ADMIN — OXYCODONE HYDROCHLORIDE AND ACETAMINOPHEN 1000 MG: 500 TABLET ORAL at 00:18

## 2021-05-04 RX ADMIN — HEPARIN SODIUM 5000 UNITS: 5000 INJECTION INTRAVENOUS; SUBCUTANEOUS at 21:04

## 2021-05-04 RX ADMIN — ZINC SULFATE 220 MG (50 MG) CAPSULE 220 MG: CAPSULE at 08:08

## 2021-05-04 RX ADMIN — REMDESIVIR 100 MG: 100 INJECTION, POWDER, LYOPHILIZED, FOR SOLUTION INTRAVENOUS at 22:48

## 2021-05-04 RX ADMIN — NADOLOL 40 MG: 20 TABLET ORAL at 08:44

## 2021-05-04 RX ADMIN — Medication 2000 UNITS: at 08:08

## 2021-05-04 RX ADMIN — DOXYCYCLINE 100 MG: 100 INJECTION, POWDER, LYOPHILIZED, FOR SOLUTION INTRAVENOUS at 12:58

## 2021-05-04 RX ADMIN — ACETAMINOPHEN 650 MG: 325 TABLET ORAL at 01:46

## 2021-05-04 RX ADMIN — OXYCODONE HYDROCHLORIDE AND ACETAMINOPHEN 1000 MG: 500 TABLET ORAL at 08:08

## 2021-05-04 RX ADMIN — ATORVASTATIN CALCIUM 20 MG: 20 TABLET, FILM COATED ORAL at 16:05

## 2021-05-04 RX ADMIN — DOXYCYCLINE 100 MG: 100 INJECTION, POWDER, LYOPHILIZED, FOR SOLUTION INTRAVENOUS at 00:19

## 2021-05-04 RX ADMIN — HEPARIN SODIUM 5000 UNITS: 5000 INJECTION INTRAVENOUS; SUBCUTANEOUS at 00:18

## 2021-05-04 RX ADMIN — DOXYCYCLINE 100 MG: 100 INJECTION, POWDER, LYOPHILIZED, FOR SOLUTION INTRAVENOUS at 22:48

## 2021-05-04 RX ADMIN — PANTOPRAZOLE SODIUM 40 MG: 40 TABLET, DELAYED RELEASE ORAL at 05:26

## 2021-05-04 RX ADMIN — VENLAFAXINE HYDROCHLORIDE 37.5 MG: 37.5 CAPSULE, EXTENDED RELEASE ORAL at 08:08

## 2021-05-04 RX ADMIN — LORAZEPAM 0.5 MG: 0.5 TABLET ORAL at 22:47

## 2021-05-04 RX ADMIN — REMDESIVIR 200 MG: 5 INJECTION INTRAVENOUS at 00:19

## 2021-05-04 RX ADMIN — LOPERAMIDE HYDROCHLORIDE 2 MG: 2 CAPSULE ORAL at 11:42

## 2021-05-04 RX ADMIN — LORAZEPAM 0.5 MG: 0.5 TABLET ORAL at 00:18

## 2021-05-04 RX ADMIN — CEFTRIAXONE 1000 MG: 1 INJECTION, SOLUTION INTRAVENOUS at 11:43

## 2021-05-04 RX ADMIN — OXYCODONE HYDROCHLORIDE AND ACETAMINOPHEN 1000 MG: 500 TABLET ORAL at 21:04

## 2021-05-04 RX ADMIN — SODIUM CHLORIDE 50 ML/HR: 0.9 INJECTION, SOLUTION INTRAVENOUS at 00:18

## 2021-05-04 RX ADMIN — FLUTICASONE PROPIONATE 1 SPRAY: 50 SPRAY, METERED NASAL at 08:45

## 2021-05-04 RX ADMIN — HEPARIN SODIUM 5000 UNITS: 5000 INJECTION INTRAVENOUS; SUBCUTANEOUS at 05:26

## 2021-05-04 RX ADMIN — HEPARIN SODIUM 5000 UNITS: 5000 INJECTION INTRAVENOUS; SUBCUTANEOUS at 14:40

## 2021-05-04 RX ADMIN — ACETAMINOPHEN 650 MG: 325 TABLET ORAL at 23:37

## 2021-05-04 RX ADMIN — SODIUM CHLORIDE 50 ML/HR: 0.9 INJECTION, SOLUTION INTRAVENOUS at 22:49

## 2021-05-04 RX ADMIN — ONDANSETRON 4 MG: 2 INJECTION INTRAMUSCULAR; INTRAVENOUS at 12:05

## 2021-05-04 NOTE — CASE MANAGEMENT
CM placed call into pts room to review the CM role and discuss possible dc needs  Pt lives with her son and DIL in a [facility] with [address] in [address]  Pt was IPTA  Pt has DME of a cane and is ambulatory without assistance  Pt denies any history of drug/etoh abuse, mental illness or inpatient psych admissions  Pt denies any history of SNF/Rehab or VNA  Pt does not have a POA/LW  Pt sts her son, Bernardo Meyers and daughter, Mary Doyle would be in charge of decision making if pt is unable to do so herself  Pt drives, family is also available for transportation needs  Pt does not use O2 at baseline      Preferred Pharmacy: [address] [place] [address] or [facility]  Contact: Kalin Carrasco, daughter, [phone]  PCP: Dr Brayan Mcgrath    CM reviewed d/c planning process including the following: identifying help at home, patient preference for d/c planning needs, availability of treatment team to discuss questions or concerns patient and/or family may have regarding understanding medications and recognizing signs and symptoms once discharged  CM also encouraged patient to follow up with all recommended appointments after discharge  Patient advised of importance for patient and family to participate in managing patients medical well being

## 2021-05-04 NOTE — PLAN OF CARE
Problem: Potential for Falls  Goal: Patient will remain free of falls  Description: INTERVENTIONS:  - Assess patient frequently for physical needs  -  Identify cognitive and physical deficits and behaviors that affect risk of falls  -  [place] fall precautions as indicated by assessment   - Educate patient/family on patient safety including physical limitations  - Instruct patient to call for assistance with activity based on assessment  - Modify environment to reduce risk of injury  - Consider OT/PT consult to assist with strengthening/mobility  Outcome: Progressing     Problem: NEUROSENSORY - ADULT  Goal: Achieves stable or improved neurological status  Description: INTERVENTIONS  - Monitor and report changes in neurological status  - Monitor vital signs such as temperature, blood pressure, glucose, and any other labs ordered   - Initiate measures to prevent increased intracranial pressure  - Monitor for seizure activity and implement precautions if appropriate      Outcome: Progressing  Goal: Remains free of injury related to seizures activity  Description: INTERVENTIONS  - Maintain airway, patient safety  and administer oxygen as ordered  - Monitor patient for seizure activity, document and report duration and description of seizure to physician/advanced practitioner  - If seizure occurs,  ensure patient safety during seizure  - Reorient patient post seizure  - Seizure pads on all 4 side rails  - Instruct patient/family to notify RN of any seizure activity including if an aura is experienced  - Instruct patient/family to call for assistance with activity based on nursing assessment  - Administer anti-seizure medications if ordered    Outcome: Progressing  Goal: Achieves maximal functionality and self care  Description: INTERVENTIONS  - Monitor swallowing and airway patency with patient fatigue and changes in neurological status  - Encourage and assist patient to increase activity and self care     - Encourage visually impaired, hearing impaired and aphasic patients to use assistive/communication devices  Outcome: Progressing     Problem: CARDIOVASCULAR - ADULT  Goal: Maintains optimal cardiac output and hemodynamic stability  Description: INTERVENTIONS:  - Monitor I/O, vital signs and rhythm  - Monitor for S/S and trends of decreased cardiac output  - Administer and titrate ordered vasoactive medications to optimize hemodynamic stability  - Assess quality of pulses, skin color and temperature  - Assess for signs of decreased coronary artery perfusion  - Instruct patient to report change in severity of symptoms  Outcome: Progressing  Goal: Absence of cardiac dysrhythmias or at baseline rhythm  Description: INTERVENTIONS:  - Continuous cardiac monitoring, vital signs, obtain 12 lead EKG if ordered  - Administer antiarrhythmic and heart rate control medications as ordered  - Monitor electrolytes and administer replacement therapy as ordered  Outcome: Progressing     Problem: RESPIRATORY - ADULT  Goal: Achieves optimal ventilation and oxygenation  Description: INTERVENTIONS:  - Assess for changes in respiratory status  - Assess for changes in mentation and behavior  - Position to facilitate oxygenation and minimize respiratory effort  - Oxygen administered by appropriate delivery if ordered  - Initiate smoking cessation education as indicated  - Encourage broncho-pulmonary hygiene including cough, deep breathe, Incentive Spirometry  - Assess the need for suctioning and aspirate as needed  - Assess and instruct to report SOB or any respiratory difficulty  - Respiratory Therapy support as indicated  Outcome: Progressing     Problem: GASTROINTESTINAL - ADULT  Goal: Minimal or absence of nausea and/or vomiting  Description: INTERVENTIONS:  - Administer IV fluids if ordered to ensure adequate hydration  - Maintain NPO status until nausea and vomiting are resolved  - Nasogastric tube if ordered  - Administer ordered antiemetic medications as needed  - Provide nonpharmacologic comfort measures as appropriate  - Advance diet as tolerated, if ordered  - Consider nutrition services referral to assist patient with adequate nutrition and appropriate food choices  Outcome: Progressing  Goal: Maintains or returns to baseline bowel function  Description: INTERVENTIONS:  - Assess bowel function  - Encourage oral fluids to ensure adequate hydration  - Administer IV fluids if ordered to ensure adequate hydration  - Administer ordered medications as needed  - Encourage mobilization and activity  - Consider nutritional services referral to assist patient with adequate nutrition and appropriate food choices  Outcome: Progressing  Goal: Maintains adequate nutritional intake  Description: INTERVENTIONS:  - Monitor percentage of each meal consumed  - Identify factors contributing to decreased intake, treat as appropriate  - Assist with meals as needed  - Monitor I&O, weight, and lab values if indicated  - Obtain nutrition services referral as needed  Outcome: Progressing     Problem: GENITOURINARY - ADULT  Goal: Maintains or returns to baseline urinary function  Description: INTERVENTIONS:  - Assess urinary function  - Encourage oral fluids to ensure adequate hydration if ordered  - Administer IV fluids as ordered to ensure adequate hydration  - Administer ordered medications as needed  - Offer frequent toileting  - Follow urinary retention protocol if ordered  Outcome: Progressing  Goal: Absence of urinary retention  Description: INTERVENTIONS:  - Assess patients ability to void and empty bladder  - Monitor I/O  - Bladder scan as needed  - Discuss with physician/AP medications to alleviate retention as needed  - Discuss catheterization for long term situations as appropriate  Outcome: Progressing     Problem: METABOLIC, FLUID AND ELECTROLYTES - ADULT  Goal: Electrolytes maintained within normal limits  Description: INTERVENTIONS:  - Monitor labs and assess patient for signs and symptoms of electrolyte imbalances  - Administer electrolyte replacement as ordered  - Monitor response to electrolyte replacements, including repeat lab results as appropriate  - Instruct patient on fluid and nutrition as appropriate  Outcome: Progressing  Goal: Fluid balance maintained  Description: INTERVENTIONS:  - Monitor labs   - Monitor I/O and WT  - Instruct patient on fluid and nutrition as appropriate  - Assess for signs & symptoms of volume excess or deficit  Outcome: Progressing     Problem: SKIN/TISSUE INTEGRITY - ADULT  Goal: Skin integrity remains intact  Description: INTERVENTIONS  - Identify patients at risk for skin breakdown  - Assess and monitor skin integrity  - Assess and monitor nutrition and hydration status  - Monitor labs (i e  albumin)  - Assess for incontinence   - Turn and reposition patient  - Assist with mobility/ambulation  - Relieve pressure over bony prominences  - Avoid friction and shearing  - Provide appropriate hygiene as needed including keeping skin clean and dry  - Evaluate need for skin moisturizer/barrier cream  - Collaborate with interdisciplinary team (i e  Nutrition, Rehabilitation, etc )   - Patient/family teaching  Outcome: Progressing     Problem: MUSCULOSKELETAL - ADULT  Goal: Maintain or return mobility to safest level of function  Description: INTERVENTIONS:  - Assess patient's ability to carry out ADLs; assess patient's baseline for ADL function and identify physical deficits which impact ability to perform ADLs (bathing, care of mouth/teeth, toileting, grooming, dressing, etc )  - Assess/evaluate cause of self-care deficits   - Assess range of motion  - Assess patient's mobility  - Assess patient's need for assistive devices and provide as appropriate  - Encourage maximum independence but intervene and supervise when necessary  - Involve family in performance of ADLs  - Assess for home care needs following discharge   - Consider OT consult to assist with ADL evaluation and planning for discharge  - Provide patient education as appropriate  Outcome: Progressing     Problem: PAIN - ADULT  Goal: Verbalizes/displays adequate comfort level or baseline comfort level  Description: Interventions:  - Encourage patient to monitor pain and request assistance  - Assess pain using appropriate pain scale  - Administer analgesics based on type and severity of pain and evaluate response  - Implement non-pharmacological measures as appropriate and evaluate response  - Consider cultural and social influences on pain and pain management  - Notify physician/advanced practitioner if interventions unsuccessful or patient reports new pain  Outcome: Progressing     Problem: INFECTION - ADULT  Goal: Absence or prevention of progression during hospitalization  Description: INTERVENTIONS:  - Assess and monitor for signs and symptoms of infection  - Monitor lab/diagnostic results  - Monitor all insertion sites, i e  indwelling lines, tubes, and drains  - Monitor endotracheal if appropriate and nasal secretions for changes in amount and color  - [employer] appropriate cooling/warming therapies per order  - Administer medications as ordered  - Instruct and encourage patient and family to use good hand hygiene technique  - Identify and instruct in appropriate isolation precautions for identified infection/condition  Outcome: Progressing     Problem: SAFETY ADULT  Goal: Patient will remain free of falls  Description: INTERVENTIONS:  - Assess patient frequently for physical needs  -  Identify cognitive and physical deficits and behaviors that affect risk of falls    -  [employer] fall precautions as indicated by assessment   - Educate patient/family on patient safety including physical limitations  - Instruct patient to call for assistance with activity based on assessment  - Modify environment to reduce risk of injury  - Consider OT/PT consult to assist with strengthening/mobility  Outcome: Progressing  Goal: Maintain or return to baseline ADL function  Description: INTERVENTIONS:  -  Assess patient's ability to carry out ADLs; assess patient's baseline for ADL function and identify physical deficits which impact ability to perform ADLs (bathing, care of mouth/teeth, toileting, grooming, dressing, etc )  - Assess/evaluate cause of self-care deficits   - Assess range of motion  - Assess patient's mobility; develop plan if impaired  - Assess patient's need for assistive devices and provide as appropriate  - Encourage maximum independence but intervene and supervise when necessary  - Involve family in performance of ADLs  - Assess for home care needs following discharge   - Consider OT consult to assist with ADL evaluation and planning for discharge  - Provide patient education as appropriate  Outcome: Progressing  Goal: Maintain or return mobility status to optimal level  Description: INTERVENTIONS:  - Assess patient's baseline mobility status (ambulation, transfers, stairs, etc )    - Identify cognitive and physical deficits and behaviors that affect mobility  - Identify mobility aids required to assist with transfers and/or ambulation (gait belt, sit-to-stand, lift, walker, cane, etc )  - [place] fall precautions as indicated by assessment  - Record patient progress and toleration of activity level on Mobility SBAR; progress patient to next Phase/Stage  - Instruct patient to call for assistance with activity based on assessment  - Consider rehabilitation consult to assist with strengthening/weightbearing, etc   Outcome: Progressing     Problem: DISCHARGE PLANNING  Goal: Discharge to home or other facility with appropriate resources  Description: INTERVENTIONS:  - Identify barriers to discharge w/patient and caregiver  - Arrange for needed discharge resources and transportation as appropriate  - Identify discharge learning needs (meds, wound care, etc )  - Arrange for interpretive services to assist at discharge as needed  - Refer to Case Management Department for coordinating discharge planning if the patient needs post-hospital services based on physician/advanced practitioner order or complex needs related to functional status, cognitive ability, or social support system  Outcome: Progressing     Problem: Knowledge Deficit  Goal: Patient/family/caregiver demonstrates understanding of disease process, treatment plan, medications, and discharge instructions  Description: Complete learning assessment and assess knowledge base  Interventions:  - Provide teaching at level of understanding  - Provide teaching via preferred learning methods  Outcome: Progressing     Problem: Nutrition/Hydration-ADULT  Goal: Nutrient/Hydration intake appropriate for improving, restoring or maintaining nutritional needs  Description: Monitor and assess patient's nutrition/hydration status for malnutrition  Collaborate with interdisciplinary team and initiate plan and interventions as ordered  Monitor patient's weight and dietary intake as ordered or per policy  Utilize nutrition screening tool and intervene as necessary  Determine patient's food preferences and provide high-protein, high-caloric foods as appropriate       INTERVENTIONS:  - Monitor oral intake, urinary output, labs, and treatment plans  - Assess nutrition and hydration status and recommend course of action  - Evaluate amount of meals eaten  - Assist patient with eating if necessary   - Allow adequate time for meals  - Recommend/ encourage appropriate diets, oral nutritional supplements, and vitamin/mineral supplements  - Order, calculate, and assess calorie counts as needed  - Recommend, monitor, and adjust tube feedings and TPN/PPN based on assessed needs  - Assess need for intravenous fluids  - Provide specific nutrition/hydration education as appropriate  - Include patient/family/caregiver in decisions related to nutrition  Outcome: Progressing

## 2021-05-04 NOTE — ASSESSMENT & PLAN NOTE
· BP reviewed  · Continue Corgard   · Hold Maxzide and enalapril d/t SUSANA  · Monitor blood pressure

## 2021-05-04 NOTE — ASSESSMENT & PLAN NOTE
· On CKD stage 3 Creatinine 1 82  Baseline around 1 4 -1 5 from [facility] records   Suspect multifactorial due to use of Ace inhibitor and decreased oral intake with acute diarrhea  · Hold diuretic and Ace  · Normal saline at 50 mL/hr patient is still having diarrhea will give Imodium but the creatinine is improving continue normal saline at 50 for now  · Daily metabolic panel and trend creatinine

## 2021-05-04 NOTE — ASSESSMENT & PLAN NOTE
· Presents with congestion, myalgia, nausea, and fatigue  · All household members have [place]  · COVID testing:  Positive  · Not requiring supplemental oxygen:  Saturation is 95% on room air  · CTA chest PE study: Extensive multifocal groundglass opacities correlating to the history of Covid-19 pneumonia/pneumonitis    No PE  · BNP 20 80, , trop <0 02  · CRP 43 D-dimer 1 12 ferritin pending  · Procalcitonin and blood cultures pending  · Oxygen protocol  · Doxycycline  · Ceftriaxone not given due to penicillin allergy-hives  · Remdesivir  · Vitamin-C vitamin-D zinc  · Respiratory protocol  · Heparin

## 2021-05-04 NOTE — ASSESSMENT & PLAN NOTE
· Creatinine 1 82  Baseline around 1 4 from [facility] records   Suspect multifactorial due to use of Ace inhibitor and decreased oral intake  · Hold diuretic and Ace  · Normal saline at 50 mL/hr  · Daily metabolic panel and trend creatinine

## 2021-05-04 NOTE — UTILIZATION REVIEW
Inpatient Admission Authorization Request   NOTIFICATION OF INPATIENT ADMISSION/INPATIENT AUTHORIZATION REQUEST   SERVICING FACILITY:   [address] [place] [address]  [address], [address]  Tax ID: [id number]  NPI: [id number]  Place of Service: Inpatient [facility]  [address]  Place of Service Code: 24     ATTENDING PROVIDER:  Attending Name and NPI#: Shaq Bonilla [place] [[id number]]  Address[facility] [address] Sofia  [facility] [address]  Phone: [phone]     UTILIZATION REVIEW CONTACT:  [facility] Utilization   Network Utilization Review Department  Phone: [phone]  Fax [phone]  Email: [email or web address]     PHYSICIAN ADVISORY SERVICES:  FOR [id number] REVIEW - MEDICAL NECESSITY DENIAL  Phone: [phone]  Fax: [phone]  Email: [email or web address]     TYPE OF REQUEST:  Inpatient Status     ADMISSION INFORMATION:  ADMISSION DATE/TIME: 5/3/21 2128  PATIENT DIAGNOSIS CODE/DESCRIPTION:  Cough [R05]  Pneumonitis [J18 9]  Dyspnea [R06 00]  SOB (shortness of breath) [R06 02]  Hypoxia [R09 02]  COVID-19 [U07 1]  DISCHARGE DATE/TIME: No discharge date for patient encounter  DISCHARGE DISPOSITION (IF DISCHARGED): Final discharge disposition not confirmed     IMPORTANT INFORMATION:  Please contact the Mag[facility] Gil directly with any questions or concerns regarding this request  Department voicemails are confidential     Send requests for admission clinical reviews, concurrent reviews, approvals, and administrative denials due to lack of clinical to fax [phone]

## 2021-05-04 NOTE — PROGRESS NOTES
[address]  Progress Note - Malgorzata Mims 1940, 78 y o  female MRN: [id number]  Unit/Bed#: -01 Encounter: [id number]  Primary Care Provider: Presley Denver, MD   Date and time admitted to hospital: 5/3/2021  6:51 PM    Hyponatremia  Assessment & Plan  · Sodium 134  · Normal saline at 50 mL an hour  · Recheck metabolic panel and trend sodium    Anxiety  Assessment & Plan  · Currently controlled  · Continue Effexor  · Continue Ativan as needed    SUSANA (acute kidney injury) ([facility] [address] )  Assessment & Plan  · Creatinine 1 82  Baseline around 1 4 from [address] records  Suspect multifactorial due to use of Ace inhibitor and decreased oral intake  · Hold diuretic and Ace  · Normal saline at 50 mL/hr  · Daily metabolic panel and trend creatinine    Essential hypertension  Assessment & Plan  · BP reviewed  · Continue Corgard   · Hold Maxzide and enalapril d/t SUSANA  · Monitor blood pressure    Gastroesophageal reflux disease without esophagitis  Assessment & Plan  · Currently controlled-takes omeprazole-will be formulary substituted with Protonix  · Monitor      Abnormal finding on CT scan  Assessment & Plan  · CTA chest: Scattered splenic, hepatic and bilateral hilar calcified granulomata  · Outpatient follow-up if indicated       * Pneumonia due to COVID-19 virus  Assessment & Plan  · Presents with congestion, myalgia, nausea, and fatigue  · All household members have COVID  · COVID testing:  Positive  · Not requiring supplemental oxygen:  Saturation is 95% on room air  · CTA chest PE study: Extensive multifocal groundglass opacities correlating to the history of Covid-19 pneumonia/pneumonitis    No PE  · BNP 20 80, , trop <0 02  · CRP 43 D-dimer 1 12 ferritin pending  · Procalcitonin and blood cultures pending  · Oxygen protocol  · Doxycycline  · Ceftriaxone not given due to penicillin allergy-hives  · Remdesivir  · Vitamin-C vitamin-D zinc  · Respiratory protocol  · Heparin        VTE Pharmacologic Prophylaxis:   Pharmacologic: Heparin  Mechanical VTE Prophylaxis in Place: Yes    Patient Centered Rounds: I have performed bedside rounds with nursing staff today  Discussions with Specialists or Other Care Team Provider:  I have discussed with case management    Education and Discussions with Family / Patient:  Patient will update daughter    Time Spent for Care: 30 minutes  More than 50% of total time spent on counseling and coordination of care as described above  Current Length of Stay: 1 day(s)    Current Patient Status: Inpatient   Certification Statement: The patient will continue to require additional inpatient hospital stay due to COVID-19 pneumonia and diarrhea    Discharge Plan:  Anticipate 3 more days    Code Status: Level 1 - Full Code      Subjective:   Patient seen and examined no chest pain no shortness of breath there is some cough and still ongoing diarrhea poor appetite    Objective:     Vitals:   Temp (24hrs), Av 4 °F (37 4 °C), Min:97 8 °F (36 6 °C), Max:101 4 °F (38 6 °C)    Temp:  [97 8 °F (36 6 °C)-101 4 °F (38 6 °C)] 99 °F (37 2 °C)  HR:  [62-75] 67  Resp:  [19-28] 19  BP: (151-186)/(66-77) 151/66  SpO2:  [93 %-97 %] 94 %  Body mass index is 29 64 kg/m²  Input and Output Summary (last 24 hours): Intake/Output Summary (Last 24 hours) at 2021 1052  Last data filed at 2021 0929  Gross per 24 hour   Intake 120 ml   Output --   Net 120 ml       Physical Exam:     Physical Exam  Vitals signs and nursing note reviewed  Constitutional:       General: She is not in acute distress  Appearance: She is well-developed  HENT:      Head: Normocephalic and atraumatic  Eyes:      Conjunctiva/sclera: Conjunctivae normal    Neck:      Musculoskeletal: Neck supple  Cardiovascular:      Rate and Rhythm: Normal rate and regular rhythm  Heart sounds: No murmur     Pulmonary:      Effort: Pulmonary effort is normal  No respiratory distress  Breath sounds: No wheezing or rales  Comments: Decreased at bases  Abdominal:      General: There is no distension  Palpations: Abdomen is soft  Tenderness: There is no abdominal tenderness  Musculoskeletal:         General: No swelling  Skin:     General: Skin is warm and dry  Neurological:      General: No focal deficit present  Mental Status: She is alert and oriented to person, place, and time  Psychiatric:         Mood and Affect: Mood normal            Additional Data:     Labs:    Results from last 7 days   Lab Units 05/04/21  0531   WBC Thousand/uL 5 97   HEMOGLOBIN g/dL 10 8*   HEMATOCRIT % 34 7*   PLATELETS Thousands/uL 139*   NEUTROS PCT % 42*   LYMPHS PCT % 47*   MONOS PCT % 9   EOS PCT % 1     Results from last 7 days   Lab Units 05/04/21  0531   SODIUM mmol/L 136   POTASSIUM mmol/L 4 4   CHLORIDE mmol/L 105   CO2 mmol/L 18*   BUN mg/dL 38*   CREATININE mg/dL 1 69*   ANION GAP mmol/L 13   CALCIUM mg/dL 8 2*   ALBUMIN g/dL 3 2*   TOTAL BILIRUBIN mg/dL 0 53   ALK PHOS U/L 54   ALT U/L 9*   AST U/L 21   GLUCOSE RANDOM mg/dL 99                 Results from last 7 days   Lab Units 05/03/21  2205 05/03/21  1926   LACTIC ACID mmol/L  --  1 5   PROCALCITONIN ng/ml 0 06  --            * I Have Reviewed All Lab Data Listed Above  * Additional Pertinent Lab Tests Reviewed:  All Labs Within Last 24 Hours Reviewed    Imaging:    Imaging Reports Reviewed Today Include:  None today  Imaging Personally Reviewed by Myself Includes:      Recent Cultures (last 7 days):           Last 24 Hours Medication List:   Current Facility-Administered Medications   Medication Dose Route Frequency Provider Last Rate    acetaminophen  650 mg Oral Q6H PRN Huy Naz, CRNP      ascorbic acid  1,000 mg Oral Q12H [facility] NP      atorvastatin  20 mg Oral Daily Huy MARTIN Childs      benzonatate  100 mg Oral TID PRN Dayanna Curry MD      cefTRIAXone 1,000 mg Intravenous Q24H Josette Rios MD      cholecalciferol  2,000 Units Oral Daily Susana Stain, [address]      doxycycline  100 mg Intravenous Q12H Susana Stain, CRNP 100 mg (05/04/21 0019)    fluticasone  1 spray Nasal Daily Susana Stain, CRNP      heparin (porcine)  5,000 Units Subcutaneous Counts include [facility] beds at the [facility] Susana Stain, CRNP      loperamide  2 mg Oral Once Josette Rios MD      LORazepam  0 5 mg Oral HS Susana Stain, CRNP      zinc sulfate  220 mg Oral Daily Susana Stain, CRNP      Followed by   Nini Lobo ON 5/11/2021] multivitamin-minerals  1 tablet Oral Daily Susana Stain, CRNP      nadolol  40 mg Oral Daily Susana Stain, CRNP      pantoprazole  40 mg Oral Early Morning Susana Stain, CRNP      remdesivir  100 mg Intravenous Q24H Susana Stain, CRNP      sodium chloride  50 mL/hr Intravenous Continuous Susana Stain, CRNP 50 mL/hr (05/04/21 0018)    venlafaxine  37 5 mg Oral Daily Susana Stain, CRNP          Today, Patient Was Seen By: Josette Rios MD    ** Please Note: Dictation voice to text software may have been used in the creation of this document   **

## 2021-05-04 NOTE — ASSESSMENT & PLAN NOTE
· CTA chest: Scattered splenic, hepatic and bilateral hilar calcified granulomata  · Outpatient follow-up if indicated

## 2021-05-04 NOTE — PLAN OF CARE
Problem: Potential for Falls  Goal: Patient will remain free of falls  Description: INTERVENTIONS:  - Assess patient frequently for physical needs  -  Identify cognitive and physical deficits and behaviors that affect risk of falls  -  [place] fall precautions as indicated by assessment   - Educate patient/family on patient safety including physical limitations  - Instruct patient to call for assistance with activity based on assessment  - Modify environment to reduce risk of injury  - Consider OT/PT consult to assist with strengthening/mobility  Outcome: Progressing     Problem: NEUROSENSORY - ADULT  Goal: Achieves stable or improved neurological status  Description: INTERVENTIONS  - Monitor and report changes in neurological status  - Monitor vital signs such as temperature, blood pressure, glucose, and any other labs ordered   - Initiate measures to prevent increased intracranial pressure  - Monitor for seizure activity and implement precautions if appropriate      Outcome: Progressing  Goal: Remains free of injury related to seizures activity  Description: INTERVENTIONS  - Maintain airway, patient safety  and administer oxygen as ordered  - Monitor patient for seizure activity, document and report duration and description of seizure to physician/advanced practitioner  - If seizure occurs,  ensure patient safety during seizure  - Reorient patient post seizure  - Seizure pads on all 4 side rails  - Instruct patient/family to notify RN of any seizure activity including if an aura is experienced  - Instruct patient/family to call for assistance with activity based on nursing assessment  - Administer anti-seizure medications if ordered    Outcome: Progressing  Goal: Achieves maximal functionality and self care  Description: INTERVENTIONS  - Monitor swallowing and airway patency with patient fatigue and changes in neurological status  - Encourage and assist patient to increase activity and self care     - Encourage visually impaired, hearing impaired and aphasic patients to use assistive/communication devices  Outcome: Progressing     Problem: CARDIOVASCULAR - ADULT  Goal: Maintains optimal cardiac output and hemodynamic stability  Description: INTERVENTIONS:  - Monitor I/O, vital signs and rhythm  - Monitor for S/S and trends of decreased cardiac output  - Administer and titrate ordered vasoactive medications to optimize hemodynamic stability  - Assess quality of pulses, skin color and temperature  - Assess for signs of decreased coronary artery perfusion  - Instruct patient to report change in severity of symptoms  Outcome: Progressing  Goal: Absence of cardiac dysrhythmias or at baseline rhythm  Description: INTERVENTIONS:  - Continuous cardiac monitoring, vital signs, obtain 12 lead EKG if ordered  - Administer antiarrhythmic and heart rate control medications as ordered  - Monitor electrolytes and administer replacement therapy as ordered  Outcome: Progressing     Problem: RESPIRATORY - ADULT  Goal: Achieves optimal ventilation and oxygenation  Description: INTERVENTIONS:  - Assess for changes in respiratory status  - Assess for changes in mentation and behavior  - Position to facilitate oxygenation and minimize respiratory effort  - Oxygen administered by appropriate delivery if ordered  - Initiate smoking cessation education as indicated  - Encourage broncho-pulmonary hygiene including cough, deep breathe, Incentive Spirometry  - Assess the need for suctioning and aspirate as needed  - Assess and instruct to report SOB or any respiratory difficulty  - Respiratory Therapy support as indicated  Outcome: Progressing     Problem: GASTROINTESTINAL - ADULT  Goal: Minimal or absence of nausea and/or vomiting  Description: INTERVENTIONS:  - Administer IV fluids if ordered to ensure adequate hydration  - Maintain NPO status until nausea and vomiting are resolved  - Nasogastric tube if ordered  - Administer ordered antiemetic medications as needed  - Provide nonpharmacologic comfort measures as appropriate  - Advance diet as tolerated, if ordered  - Consider nutrition services referral to assist patient with adequate nutrition and appropriate food choices  Outcome: Progressing  Goal: Maintains or returns to baseline bowel function  Description: INTERVENTIONS:  - Assess bowel function  - Encourage oral fluids to ensure adequate hydration  - Administer IV fluids if ordered to ensure adequate hydration  - Administer ordered medications as needed  - Encourage mobilization and activity  - Consider nutritional services referral to assist patient with adequate nutrition and appropriate food choices  Outcome: Progressing  Goal: Maintains adequate nutritional intake  Description: INTERVENTIONS:  - Monitor percentage of each meal consumed  - Identify factors contributing to decreased intake, treat as appropriate  - Assist with meals as needed  - Monitor I&O, weight, and lab values if indicated  - Obtain nutrition services referral as needed  Outcome: Progressing  Goal: Establish and maintain optimal ostomy function  Description: INTERVENTIONS:  - Assess bowel function  - Encourage oral fluids to ensure adequate hydration  - Administer IV fluids if ordered to ensure adequate hydration   - Administer ordered medications as needed  - Encourage mobilization and activity  - Nutrition services referral to assist patient with appropriate food choices  - Assess stoma site  - Consider wound care consult   Outcome: Progressing     Problem: GENITOURINARY - ADULT  Goal: Maintains or returns to baseline urinary function  Description: INTERVENTIONS:  - Assess urinary function  - Encourage oral fluids to ensure adequate hydration if ordered  - Administer IV fluids as ordered to ensure adequate hydration  - Administer ordered medications as needed  - Offer frequent toileting  - Follow urinary retention protocol if ordered  Outcome: Progressing  Goal: Absence of urinary retention  Description: INTERVENTIONS:  - Assess patients ability to void and empty bladder  - Monitor I/O  - Bladder scan as needed  - Discuss with physician/AP medications to alleviate retention as needed  - Discuss catheterization for long term situations as appropriate  Outcome: Progressing  Goal: Urinary catheter remains patent  Description: INTERVENTIONS:  - Assess patency of urinary catheter  - If patient has a chronic melo, consider changing catheter if non-functioning  - Follow guidelines for intermittent irrigation of non-functioning urinary catheter  Outcome: Progressing     Problem: METABOLIC, FLUID AND ELECTROLYTES - ADULT  Goal: Electrolytes maintained within normal limits  Description: INTERVENTIONS:  - Monitor labs and assess patient for signs and symptoms of electrolyte imbalances  - Administer electrolyte replacement as ordered  - Monitor response to electrolyte replacements, including repeat lab results as appropriate  - Instruct patient on fluid and nutrition as appropriate  Outcome: Progressing  Goal: Fluid balance maintained  Description: INTERVENTIONS:  - Monitor labs   - Monitor I/O and WT  - Instruct patient on fluid and nutrition as appropriate  - Assess for signs & symptoms of volume excess or deficit  Outcome: Progressing  Goal: Glucose maintained within target range  Description: INTERVENTIONS:  - Monitor Blood Glucose as ordered  - Assess for signs and symptoms of hyperglycemia and hypoglycemia  - Administer ordered medications to maintain glucose within target range  - Assess nutritional intake and initiate nutrition service referral as needed  Outcome: Progressing     Problem: SKIN/TISSUE INTEGRITY - ADULT  Goal: Skin integrity remains intact  Description: INTERVENTIONS  - Identify patients at risk for skin breakdown  - Assess and monitor skin integrity  - Assess and monitor nutrition and hydration status  - Monitor labs (i e  albumin)  - Assess for incontinence - Turn and reposition patient  - Assist with mobility/ambulation  - Relieve pressure over bony prominences  - Avoid friction and shearing  - Provide appropriate hygiene as needed including keeping skin clean and dry  - Evaluate need for skin moisturizer/barrier cream  - Collaborate with interdisciplinary team (i e  Nutrition, Rehabilitation, etc )   - Patient/family teaching  Outcome: Progressing  Goal: Incision(s), wounds(s) or drain site(s) healing without S/S of infection  Description: INTERVENTIONS  - Assess and document risk factors for skin impairment   - Assess and document dressing, incision, wound bed, drain sites and surrounding tissue  - Consider nutrition services referral as needed  - Oral mucous membranes remain intact  - Provide patient/ family education  Outcome: Progressing  Goal: Oral mucous membranes remain intact  Description: INTERVENTIONS  - Assess oral mucosa and hygiene practices  - Implement preventative oral hygiene regimen  - Implement oral medicated treatments as ordered  - Initiate Nutrition services referral as needed  Outcome: Progressing     Problem: HEMATOLOGIC - ADULT  Goal: Maintains hematologic stability  Description: INTERVENTIONS  - Assess for signs and symptoms of bleeding or hemorrhage  - Monitor labs  - Administer supportive blood products/factors as ordered and appropriate  Outcome: Progressing     Problem: MUSCULOSKELETAL - ADULT  Goal: Maintain or return mobility to safest level of function  Description: INTERVENTIONS:  - Assess patient's ability to carry out ADLs; assess patient's baseline for ADL function and identify physical deficits which impact ability to perform ADLs (bathing, care of mouth/teeth, toileting, grooming, dressing, etc )  - Assess/evaluate cause of self-care deficits   - Assess range of motion  - Assess patient's mobility  - Assess patient's need for assistive devices and provide as appropriate  - Encourage maximum independence but intervene and supervise when necessary  - Involve family in performance of ADLs  - Assess for home care needs following discharge   - Consider OT consult to assist with ADL evaluation and planning for discharge  - Provide patient education as appropriate  Outcome: Progressing  Goal: Maintain proper alignment of affected body part  Description: INTERVENTIONS:  - Support, maintain and protect limb and body alignment  - Provide patient/ family with appropriate education  Outcome: Progressing     Problem: PAIN - ADULT  Goal: Verbalizes/displays adequate comfort level or baseline comfort level  Description: Interventions:  - Encourage patient to monitor pain and request assistance  - Assess pain using appropriate pain scale  - Administer analgesics based on type and severity of pain and evaluate response  - Implement non-pharmacological measures as appropriate and evaluate response  - Consider cultural and social influences on pain and pain management  - Notify physician/advanced practitioner if interventions unsuccessful or patient reports new pain  Outcome: Progressing     Problem: INFECTION - ADULT  Goal: Absence or prevention of progression during hospitalization  Description: INTERVENTIONS:  - Assess and monitor for signs and symptoms of infection  - Monitor lab/diagnostic results  - Monitor all insertion sites, i e  indwelling lines, tubes, and drains  - Monitor endotracheal if appropriate and nasal secretions for changes in amount and color  - [place] appropriate cooling/warming therapies per order  - Administer medications as ordered  - Instruct and encourage patient and family to use good hand hygiene technique  - Identify and instruct in appropriate isolation precautions for identified infection/condition  Outcome: Progressing  Goal: Absence of fever/infection during neutropenic period  Description: INTERVENTIONS:  - Monitor WBC    Outcome: Progressing     Problem: SAFETY ADULT  Goal: Patient will remain free of falls  Description: INTERVENTIONS:  - Assess patient frequently for physical needs  -  Identify cognitive and physical deficits and behaviors that affect risk of falls    -  [place] fall precautions as indicated by assessment   - Educate patient/family on patient safety including physical limitations  - Instruct patient to call for assistance with activity based on assessment  - Modify environment to reduce risk of injury  - Consider OT/PT consult to assist with strengthening/mobility  Outcome: Progressing  Goal: Maintain or return to baseline ADL function  Description: INTERVENTIONS:  -  Assess patient's ability to carry out ADLs; assess patient's baseline for ADL function and identify physical deficits which impact ability to perform ADLs (bathing, care of mouth/teeth, toileting, grooming, dressing, etc )  - Assess/evaluate cause of self-care deficits   - Assess range of motion  - Assess patient's mobility; develop plan if impaired  - Assess patient's need for assistive devices and provide as appropriate  - Encourage maximum independence but intervene and supervise when necessary  - Involve family in performance of ADLs  - Assess for home care needs following discharge   - Consider OT consult to assist with ADL evaluation and planning for discharge  - Provide patient education as appropriate  Outcome: Progressing  Goal: Maintain or return mobility status to optimal level  Description: INTERVENTIONS:  - Assess patient's baseline mobility status (ambulation, transfers, stairs, etc )    - Identify cognitive and physical deficits and behaviors that affect mobility  - Identify mobility aids required to assist with transfers and/or ambulation (gait belt, sit-to-stand, lift, walker, cane, etc )  - [place] fall precautions as indicated by assessment  - Record patient progress and toleration of activity level on Mobility SBAR; progress patient to next Phase/Stage  - Instruct patient to call for assistance with activity based on assessment  - Consider rehabilitation consult to assist with strengthening/weightbearing, etc   Outcome: Progressing     Problem: DISCHARGE PLANNING  Goal: Discharge to home or other facility with appropriate resources  Description: INTERVENTIONS:  - Identify barriers to discharge w/patient and caregiver  - Arrange for needed discharge resources and transportation as appropriate  - Identify discharge learning needs (meds, wound care, etc )  - Arrange for interpretive services to assist at discharge as needed  - Refer to Case Management Department for coordinating discharge planning if the patient needs post-hospital services based on physician/advanced practitioner order or complex needs related to functional status, cognitive ability, or social support system  Outcome: Progressing     Problem: Knowledge Deficit  Goal: Patient/family/caregiver demonstrates understanding of disease process, treatment plan, medications, and discharge instructions  Description: Complete learning assessment and assess knowledge base    Interventions:  - Provide teaching at level of understanding  - Provide teaching via preferred learning methods  Outcome: Progressing

## 2021-05-04 NOTE — UTILIZATION REVIEW
Initial Clinical Review    Admission: Date/Time/Statement:   Admission Orders (From admission, onward)     Ordered        05/03/21 2127  Inpatient Admission  Once                   Orders Placed This Encounter   Procedures    Inpatient Admission     Standing Status:   Standing     Number of Occurrences:   1     Order Specific Question:   Level of Care     Answer:   Med Surg [16]     Order Specific Question:   Estimated length of stay     Answer:   More than 2 Midnights     Order Specific Question:   Certification     Answer:   I certify that inpatient services are medically necessary for this patient for a duration of greater than two midnights  See H&P and MD Progress Notes for additional information about the patient's course of treatment  ED Arrival Information     Expected Arrival Acuity Means of Arrival Escorted By Service Admission Type    5/3/2021  5/3/2021 18:25 Urgent Walk-In Family Member Hospitalist Urgent    Arrival Complaint    flu-like symptoms/SOB        Chief Complaint   Patient presents with    Cough     pt began with a cough last wednesday and diarrhea  states her whole family had covid       Initial Presentation: 77 yo female to ED from home w/ congestion, myalgias, fatigue, and nausea x5 days   Tested + for COVID   + dry cough   CXR + multifocal opacities   Admitted IP status w/ pneumonia plan to f/u inflammatory markers, tx w/ IV abx , heparin , supplements  SUSANA creat 1 82 , baseline 1 4 hold diuretics , give IVF and trend  HTN cont corgard , hold maxzide and enalapril and monitor   Date:  5/4  Day 2: cont w/ cough , diarrhea and poor appetite   Cont to monitor , IV abx , f/u labs, IVF   Lungs w/ dec BS at bases         ED Triage Vitals   Temperature Pulse Respirations Blood Pressure SpO2   05/03/21 1858 05/03/21 1858 05/03/21 1858 05/03/21 1858 05/03/21 1858   100 °F (37 8 °C) 66 20 (!) 186/77 97 %      Temp Source Heart Rate Source Patient Position - Orthostatic VS BP Location FiO2 (%)   05/03/21 1858 05/03/21 2114 05/03/21 1858 05/03/21 1858 --   Temporal Monitor Lying Right arm       Pain Score       05/03/21 2305       No Pain          Wt Readings from Last 1 Encounters:   05/03/21 77 1 kg (170 lb)     Additional Vital Signs:   05/04/21 0810  --  --  --  --  --  94 %  None (Room air)  --   05/04/21 07:51:48  99 °F (37 2 °C)  67  19  151/66  94  96 %  --  --   05/04/21 03:18:32  99 1 °F (37 3 °C)  62  --  --  --  93 %  --  --   05/04/21 03:15:40  97 9 °F (36 6 °C)  63  --  --  --  95 %  --  --   05/04/21 0143  101 4 °F (38 6 °C)Abnormal   62  --  --  --  95 %  --  --   05/03/21 2305  --  --  --  --  --  --  None (Room air)  --   05/03/21 22:43:43  100 5 °F (38 1 °C)  73  20  168/76  107  94 %  None (Room air)  --   05/03/21 2200  --  65  25Abnormal   185/76Abnormal   109  95 %  None (Room air)  Sitting   05/03/21 2114  --  66  --  165/76  --  96 %  None (Room air)  Lying       Pertinent Labs/Diagnostic Test Results:   5/3 EKG  NSR  5/3 PCXR Mild bilateral peripheral groundglass opacity due to Covid 19   5/3 CTA chest    1  Extensive multifocal groundglass opacities correlating to the history of Covid-19 pneumonia/pneumonitis  2   Scattered splenic, hepatic and bilateral hilar calcified granulomata  3   Cholelithiasis    4   No pulmonary embolism         Results from last 7 days   Lab Units 05/03/21 1926   SARS-COV-2  Positive*     Results from last 7 days   Lab Units 05/04/21 0531 05/03/21 1926   WBC Thousand/uL 5 97 8 21   HEMOGLOBIN g/dL 10 8* 11 9   HEMATOCRIT % 34 7* 38 5   PLATELETS Thousands/uL 139* 179   NEUTROS ABS Thousands/µL 2 50 4 52     Results from last 7 days   Lab Units 05/04/21  0531 05/03/21  1926   SODIUM mmol/L 136 134*   POTASSIUM mmol/L 4 4 4 8   CHLORIDE mmol/L 105 101   CO2 mmol/L 18* 22   ANION GAP mmol/L 13 11   BUN mg/dL 38* 37*   CREATININE mg/dL 1 69* 1 85*   EGFR ml/min/1 73sq m 28 25   CALCIUM mg/dL 8 2* 9 0     Results from last 7 days   Lab Units 05/04/21  0531 05/03/21  1926   AST U/L 21 27   ALT U/L 9* 16   ALK PHOS U/L 54 65   TOTAL PROTEIN g/dL 6 4 7 6   ALBUMIN g/dL 3 2* 3 8   TOTAL BILIRUBIN mg/dL 0 53 0 65     Results from last 7 days   Lab Units 05/04/21  0531 05/03/21  1926   GLUCOSE RANDOM mg/dL 99 106     Results from last 7 days   Lab Units 05/03/21  1926   CK TOTAL U/L 207*   CK MB INDEX % <1 0   CK MB ng/mL 1 1     Results from last 7 days   Lab Units 05/03/21  1926   TROPONIN I ng/mL 0 02     Results from last 7 days   Lab Units 05/03/21  1926   D-DIMER QUANTITATIVE ug/ml FEU 1 12*       Results from last 7 days   Lab Units 05/03/21  2205   PROCALCITONIN ng/ml 0 06     Results from last 7 days   Lab Units 05/03/21  1926   LACTIC ACID mmol/L 1 5     Results from last 7 days   Lab Units 05/03/21  1926   NT-PRO BNP pg/mL 2,080*     Results from last 7 days   Lab Units 05/03/21  1926   FERRITIN ng/mL 240     Results from last 7 days   Lab Units 05/03/21  1926   CRP mg/L 43 0*     Results from last 7 days   Lab Units 05/03/21  1926   INFLUENZA A PCR  None Detected   INFLUENZA B PCR  None Detected   RSV PCR  None Detected         ED Treatment:   Medication Administration from 05/03/2021 1747 to 05/03/2021 2240       Date/Time Order Dose Route Action     05/03/2021 2107 sodium chloride 0 9 % bolus 500 mL 500 mL Intravenous New Bag        Past Medical History:   Diagnosis Date    Arthritis     GERD (gastroesophageal reflux disease)     Hypertension      Present on Admission:  **None**      Admitting Diagnosis: Cough [R05]  Pneumonitis [J18 9]  Dyspnea [R06 00]  SOB (shortness of breath) [R06 02]  Hypoxia [R09 02]  COVID-19 [U07 1]  Age/Sex: 78 y o  female  Admission Orders:  Scheduled Medications:  ascorbic acid, 1,000 mg, Oral, Q12H ELICEO  atorvastatin, 20 mg, Oral, Daily  cefTRIAXone, 1,000 mg, Intravenous, Q24H  cholecalciferol, 2,000 Units, Oral, Daily  doxycycline, 100 mg, Intravenous, Q12H  fluticasone, 1 spray, Nasal, Daily  heparin (porcine), 5,000 Units, Subcutaneous, Q8H ELICEO  LORazepam, 0 5 mg, Oral, HS  zinc sulfate, 220 mg, Oral, Daily    Followed by  Courtney Lay ON 5/11/2021] multivitamin-minerals, 1 tablet, Oral, Daily  nadolol, 40 mg, Oral, Daily  ondansetron, 4 mg, Intravenous, Once  pantoprazole, 40 mg, Oral, Early Morning  remdesivir, 100 mg, Intravenous, Q24H  venlafaxine, 37 5 mg, Oral, Daily      Continuous IV Infusions:  sodium chloride, 50 mL/hr, Intravenous, Continuous      PRN Meds:  acetaminophen, 650 mg, Oral, Q6H PRN  benzonatate, 100 mg, Oral, TID PRN    tele       Network Utilization Review Department  ATTENTION: Please call with any questions or concerns to [phone] and carefully listen to the prompts so that you are directed to the right person  All voicemails are confidential   [facility] all requests for admission clinical reviews, approved or denied determinations and any other requests to dedicated fax number below belonging to the campus where the patient is receiving treatment   List of dedicated fax numbers for the Facilities:  [address] DENIALS (Administrative/Medical Necessity) [phone]   [address] (Maternity/NICU/Pediatrics) [phone]   [address] Tobias Graves 3060 [address] [place] [address] [address] [facility] [place] [address] [phone]

## 2021-05-04 NOTE — H&P
[address]  H&P- Malgorzata Mims 1940, 78 y o  female MRN: [id number]  Unit/Bed#: -Elina Encounter: [id number]  Primary Care Provider: Rajendra George MD   Date and time admitted to hospital: 5/3/2021  6:51 PM    * Pneumonia due to COVID-19 virus  Assessment & Plan  · Presents with congestion, myalgia, nausea, and fatigue  · All household members have COVID  · COVID testing:  Positive  · Not requiring supplemental oxygen:  Saturation is 95% on room air  · CTA chest PE study: Extensive multifocal groundglass opacities correlating to the history of Covid-19 pneumonia/pneumonitis  No PE  · BNP 20 80, , trop <0 02  · CRP 43 D-dimer 1 12 ferritin pending  · Procalcitonin and blood cultures pending  · Oxygen protocol  · Doxycycline  · Ceftriaxone not given due to penicillin allergy-hives  · Remdesivir  · Vitamin-C vitamin-D zinc  · Respiratory protocol  · Heparin    SUSANA (acute kidney injury) ([facility] [address] )  Assessment & Plan  · Creatinine 1 82  Baseline around 1 4 from [facility] records   Suspect multifactorial due to use of Ace inhibitor and decreased oral intake  · Hold diuretic and Ace  · Normal saline at 50 mL/hr  · Daily metabolic panel and trend creatinine    Hyponatremia  Assessment & Plan  · Sodium 134  · Normal saline at 50 mL an hour  · Recheck metabolic panel and trend sodium    Anxiety  Assessment & Plan  · Currently controlled  · Continue Effexor  · Continue Ativan as needed    Essential hypertension  Assessment & Plan  · BP reviewed  · Continue Corgard   · Hold Maxzide and enalapril d/t SUSANA  · Monitor blood pressure    Gastroesophageal reflux disease without esophagitis  Assessment & Plan  · Currently controlled-takes omeprazole-will be formulary substituted with Protonix  · Monitor      Abnormal finding on CT scan  Assessment & Plan  · CTA chest: Scattered splenic, hepatic and bilateral hilar calcified granulomata  · Outpatient follow-up if indicated       VTE Prophylaxis: Heparin  / sequential compression device   Code Status:  Full  POLST: POLST form is not discussed and not completed at this time  Discussion with family:  Patient    Anticipated Length of Stay:  Patient will be admitted on an Inpatient basis with an anticipated length of stay of  greater than 2 midnights  Justification for Hospital Stay:  Per plan above    Total Time for Visit, including Counseling / Coordination of Care: 45 minutes  Greater than 50% of this total time spent on direct patient counseling and coordination of care  Chief Complaint:   Congestion, myalgias, fatigue, nausea    History of Present Illness:    Rocco Paredes is a 78 y o  female with history of essential hypertension , GERD , and arthritis who presents with congestion, myalgias, fatigue, and nausea  She comes for evaluation in the ER for myalgias and fatigue with congestion after being seen at an urgent care  Several household contacts are COVID positive  Patient did test positive for COVID, and CT scan shows evidence of COVID pneumonia  She is not requiring oxygen  Her room air saturation is 95%  She has a dry cough  She says that she has a decreased appetite and nausea  She also reports myalgias, fever, and feeling cold  She says her symptoms have been going on for about 5 days, air of moderate severity, it was gradual onset, they wax and wane, and are worsened by activity and deep breathing  She denies chest pain, vomiting, abdominal pain, , diarrhea , back pain, dysuria, syncope, or focal weakness  Review of Systems:    Review of Systems   Constitutional: Positive for appetite change, fatigue and fever  Negative for chills  HENT: Positive for congestion  Respiratory: Negative for cough and shortness of breath  Cardiovascular: Negative for chest pain, palpitations and leg swelling  Gastrointestinal: Positive for nausea   Negative for abdominal distention, abdominal pain, constipation, diarrhea and vomiting  Genitourinary: Negative for dysuria and frequency  Musculoskeletal: Positive for myalgias  Negative for arthralgias  Neurological: Negative for dizziness, syncope, weakness and headaches  All other systems reviewed and are negative  Past Medical and Surgical History:     Past Medical History:   Diagnosis Date    Arthritis     GERD (gastroesophageal reflux disease)     Hypertension        Past Surgical History:   Procedure Laterality Date    NO PAST SURGERIES      SINUS SURGERY         Meds/Allergies:    Prior to Admission medications    Medication Sig Start Date End Date Taking? Authorizing Provider   acetaminophen (TYLENOL) 325 mg tablet Take 650 mg by mouth every 6 (six) hours as needed for mild pain    Historical Provider, MD   atorvastatin (LIPITOR) 20 mg tablet Take 20 mg by mouth daily    Historical Provider, MD   enalapril (VASOTEC) 10 mg tablet Take 10 mg by mouth daily    Historical Provider, MD   fluticasone (FLONASE) 50 mcg/act nasal spray 1 spray into each nostril daily    Historical Provider, MD   LORazepam (ATIVAN) 0 5 mg tablet Take by mouth daily at bedtime    Historical Provider, MD   nadolol (CORGARD) 40 mg tablet Take 40 mg by mouth daily    Historical Provider, MD   omeprazole (PriLOSEC) 40 MG capsule Take 40 mg by mouth daily    Historical Provider, MD   Probiotic Product (PROBIOTIC-10 PO) Take by mouth    Historical Provider, MD   triamterene-hydrochlorothiazide (MAXZIDE-25) 37 5-25 mg per tablet Take 1 tablet by mouth daily    Historical Provider, MD   venlafaxine (EFFEXOR-XR) 37 5 mg 24 hr capsule Take 37 5 mg by mouth daily    Historical Provider, MD     I have reviewed home medications with patient personally  Allergies: Allergies   Allergen Reactions    Amoxicillin Hives    Sulfa Antibiotics Hives       Social History:     Marital Status:     Occupation:  Retired  Patient Pre-hospital Living Situation:  Home  Patient Pre-hospital Level of Mobility: Independent  Patient Pre-hospital Diet Restrictions:  None  Substance Use History:   Social History     Substance and Sexual Activity   Alcohol Use Not Currently     Social History     Tobacco Use   Smoking Status Never Smoker   Smokeless Tobacco Never Used     Social History     Substance and Sexual Activity   Drug Use Never       Family History:    non-contributory    Physical Exam:     Vitals:   Blood Pressure: 168/76 (05/03/21 2243)  Pulse: 62 (05/04/21 0318)  Temperature: 99 1 °F (37 3 °C) (05/04/21 0318)  Temp Source: Oral (05/03/21 2243)  Respirations: 20 (05/03/21 2243)  Height: 5' 3 5" (161 3 cm) (05/03/21 2243)  Weight - Scale: 77 1 kg (170 lb) (05/03/21 2243)  SpO2: 93 % (05/04/21 0318)    Physical Exam  Vitals signs and nursing note reviewed  HENT:      Head: Normocephalic and atraumatic  Mouth/Throat:      Mouth: Mucous membranes are moist       Pharynx: Oropharynx is clear  Eyes:      Extraocular Movements: Extraocular movements intact  Pupils: Pupils are equal, round, and reactive to light  Neck:      Musculoskeletal: Normal range of motion and neck supple  Cardiovascular:      Rate and Rhythm: Normal rate and regular rhythm  Pulses: Normal pulses  Heart sounds: Normal heart sounds  Pulmonary:      Effort: Pulmonary effort is normal       Breath sounds: Normal breath sounds  Abdominal:      General: Abdomen is flat  Bowel sounds are normal       Palpations: Abdomen is soft  Musculoskeletal: Normal range of motion  Skin:     General: Skin is warm and dry  Capillary Refill: Capillary refill takes less than 2 seconds  Neurological:      General: No focal deficit present  Mental Status: She is alert and oriented to person, place, and time  Additional Data:     Lab Results: I have personally reviewed pertinent reports        Results from last 7 days   Lab Units 05/03/21  1926   WBC Thousand/uL 8 21   HEMOGLOBIN g/dL 11 9   HEMATOCRIT % 38 5   PLATELETS Thousands/uL 179   NEUTROS PCT % 55   LYMPHS PCT % 33   MONOS PCT % 10   EOS PCT % 0     Results from last 7 days   Lab Units 05/03/21  1926   SODIUM mmol/L 134*   POTASSIUM mmol/L 4 8   CHLORIDE mmol/L 101   CO2 mmol/L 22   BUN mg/dL 37*   CREATININE mg/dL 1 85*   ANION GAP mmol/L 11   CALCIUM mg/dL 9 0   ALBUMIN g/dL 3 8   TOTAL BILIRUBIN mg/dL 0 65   ALK PHOS U/L 65   ALT U/L 16   AST U/L 27   GLUCOSE RANDOM mg/dL 106                 Results from last 7 days   Lab Units 05/03/21  1926   LACTIC ACID mmol/L 1 5       Imaging: I have personally reviewed pertinent reports  CTA ED chest PE Study   Final Result by Bryan Uriarte MD (05/03 2118)         1  Extensive multifocal groundglass opacities correlating to the history of Covid-19 pneumonia/pneumonitis  2   Scattered splenic, hepatic and bilateral hilar calcified granulomata  3   Cholelithiasis  4   No pulmonary embolism  Workstation performed: [id number]         XR chest 1 view portable    (Results Pending)       EKG, Pathology, and Other Studies Reviewed on Admission:   · EKG:  NSR    Allscripts / Epic Records Reviewed: Yes     ** Please Note: This note has been constructed using a voice recognition system   **

## 2021-05-04 NOTE — ASSESSMENT & PLAN NOTE
· Presents with congestion, myalgia, nausea, and fatigue  · All household members have Laverne Cardenas  · COVID testing:  Positive symptoms since Thursday 4/29  · Not requiring supplemental oxygen:  Saturation is 95% on room air  · CTA chest PE study: Extensive multifocal groundglass opacities correlating to the history of Covid-19 pneumonia/pneumonitis  No PE  · BNP 20 80, , trop <0 02  · CRP 43 D-dimer 1 12 ferritin is normal  · Procalcitonin and blood cultures pending  · Oxygen protocol  · Continue doxycycline added Rocephin discontinue antibiotics once procalcitonin x2 is negative  · Remdesivir 2/5  · Vitamin-C vitamin-D zinc  · Respiratory protocol  · Heparin  · Repeat labs tomorrow  · Placed on Tessalon Perles p r n

## 2021-05-05 LAB
ALBUMIN SERPL BCP-MCNC: 2.8 G/DL (ref 3.5–5)
ALP SERPL-CCNC: 51 U/L (ref 46–116)
ALT SERPL W P-5'-P-CCNC: 14 U/L (ref 12–78)
ANION GAP SERPL CALCULATED.3IONS-SCNC: 13 MMOL/L (ref 4–13)
AST SERPL W P-5'-P-CCNC: 21 U/L (ref 5–45)
BASOPHILS # BLD AUTO: 0.02 THOUSANDS/ΜL (ref 0–0.1)
BASOPHILS NFR BLD AUTO: 0 % (ref 0–1)
BILIRUB SERPL-MCNC: 0.39 MG/DL (ref 0.2–1)
BUN SERPL-MCNC: 35 MG/DL (ref 5–25)
CALCIUM ALBUM COR SERPL-MCNC: 8.5 MG/DL (ref 8.3–10.1)
CALCIUM SERPL-MCNC: 7.5 MG/DL (ref 8.3–10.1)
CHLORIDE SERPL-SCNC: 105 MMOL/L (ref 100–108)
CO2 SERPL-SCNC: 17 MMOL/L (ref 21–32)
CREAT SERPL-MCNC: 1.52 MG/DL (ref 0.6–1.3)
CRP SERPL QL: 59.8 MG/L
EOSINOPHIL # BLD AUTO: 0.04 THOUSAND/ΜL (ref 0–0.61)
EOSINOPHIL NFR BLD AUTO: 1 % (ref 0–6)
ERYTHROCYTE [DISTWIDTH] IN BLOOD BY AUTOMATED COUNT: 14 % (ref 11.6–15.1)
GFR SERPL CREATININE-BSD FRML MDRD: 32 ML/MIN/1.73SQ M
GLUCOSE SERPL-MCNC: 84 MG/DL (ref 65–140)
HCT VFR BLD AUTO: 32.8 % (ref 34.8–46.1)
HGB BLD-MCNC: 10.2 G/DL (ref 11.5–15.4)
IMM GRANULOCYTES # BLD AUTO: 0.04 THOUSAND/UL (ref 0–0.2)
IMM GRANULOCYTES NFR BLD AUTO: 1 % (ref 0–2)
LYMPHOCYTES # BLD AUTO: 2.9 THOUSANDS/ΜL (ref 0.6–4.47)
LYMPHOCYTES NFR BLD AUTO: 50 % (ref 14–44)
MCH RBC QN AUTO: 28.7 PG (ref 26.8–34.3)
MCHC RBC AUTO-ENTMCNC: 31.1 G/DL (ref 31.4–37.4)
MCV RBC AUTO: 92 FL (ref 82–98)
MONOCYTES # BLD AUTO: 0.49 THOUSAND/ΜL (ref 0.17–1.22)
MONOCYTES NFR BLD AUTO: 9 % (ref 4–12)
NEUTROPHILS # BLD AUTO: 2.19 THOUSANDS/ΜL (ref 1.85–7.62)
NEUTS SEG NFR BLD AUTO: 39 % (ref 43–75)
NRBC BLD AUTO-RTO: 0 /100 WBCS
PLATELET # BLD AUTO: 141 THOUSANDS/UL (ref 149–390)
PMV BLD AUTO: 11.7 FL (ref 8.9–12.7)
POTASSIUM SERPL-SCNC: 4.2 MMOL/L (ref 3.5–5.3)
PROCALCITONIN SERPL-MCNC: <0.05 NG/ML
PROT SERPL-MCNC: 5.8 G/DL (ref 6.4–8.2)
RBC # BLD AUTO: 3.55 MILLION/UL (ref 3.81–5.12)
SODIUM SERPL-SCNC: 135 MMOL/L (ref 136–145)
WBC # BLD AUTO: 5.68 THOUSAND/UL (ref 4.31–10.16)

## 2021-05-05 PROCEDURE — 85025 COMPLETE CBC W/AUTO DIFF WBC: CPT | Performed by: FAMILY MEDICINE

## 2021-05-05 PROCEDURE — 99232 SBSQ HOSP IP/OBS MODERATE 35: CPT | Performed by: FAMILY MEDICINE

## 2021-05-05 PROCEDURE — 86140 C-REACTIVE PROTEIN: CPT | Performed by: FAMILY MEDICINE

## 2021-05-05 PROCEDURE — 84145 PROCALCITONIN (PCT): CPT | Performed by: EMERGENCY MEDICINE

## 2021-05-05 PROCEDURE — 80053 COMPREHEN METABOLIC PANEL: CPT | Performed by: FAMILY MEDICINE

## 2021-05-05 RX ADMIN — LORAZEPAM 0.5 MG: 0.5 TABLET ORAL at 22:00

## 2021-05-05 RX ADMIN — DOXYCYCLINE 100 MG: 100 INJECTION, POWDER, LYOPHILIZED, FOR SOLUTION INTRAVENOUS at 13:08

## 2021-05-05 RX ADMIN — CEFTRIAXONE 1000 MG: 1 INJECTION, SOLUTION INTRAVENOUS at 11:21

## 2021-05-05 RX ADMIN — HEPARIN SODIUM 5000 UNITS: 5000 INJECTION INTRAVENOUS; SUBCUTANEOUS at 13:08

## 2021-05-05 RX ADMIN — VENLAFAXINE HYDROCHLORIDE 37.5 MG: 37.5 CAPSULE, EXTENDED RELEASE ORAL at 08:47

## 2021-05-05 RX ADMIN — ACETAMINOPHEN 650 MG: 325 TABLET ORAL at 22:00

## 2021-05-05 RX ADMIN — ATORVASTATIN CALCIUM 20 MG: 20 TABLET, FILM COATED ORAL at 16:46

## 2021-05-05 RX ADMIN — PANTOPRAZOLE SODIUM 40 MG: 40 TABLET, DELAYED RELEASE ORAL at 05:15

## 2021-05-05 RX ADMIN — NADOLOL 40 MG: 20 TABLET ORAL at 08:47

## 2021-05-05 RX ADMIN — OXYCODONE HYDROCHLORIDE AND ACETAMINOPHEN 1000 MG: 500 TABLET ORAL at 22:00

## 2021-05-05 RX ADMIN — HEPARIN SODIUM 5000 UNITS: 5000 INJECTION INTRAVENOUS; SUBCUTANEOUS at 05:15

## 2021-05-05 RX ADMIN — REMDESIVIR 100 MG: 100 INJECTION, POWDER, LYOPHILIZED, FOR SOLUTION INTRAVENOUS at 22:30

## 2021-05-05 RX ADMIN — FLUTICASONE PROPIONATE 1 SPRAY: 50 SPRAY, METERED NASAL at 08:48

## 2021-05-05 RX ADMIN — OXYCODONE HYDROCHLORIDE AND ACETAMINOPHEN 1000 MG: 500 TABLET ORAL at 08:47

## 2021-05-05 RX ADMIN — HEPARIN SODIUM 5000 UNITS: 5000 INJECTION INTRAVENOUS; SUBCUTANEOUS at 22:00

## 2021-05-05 RX ADMIN — ZINC SULFATE 220 MG (50 MG) CAPSULE 220 MG: CAPSULE at 08:47

## 2021-05-05 RX ADMIN — Medication 2000 UNITS: at 08:47

## 2021-05-05 NOTE — PLAN OF CARE
Problem: Potential for Falls  Goal: Patient will remain free of falls  Description: INTERVENTIONS:  - Assess patient frequently for physical needs  -  Identify cognitive and physical deficits and behaviors that affect risk of falls  -  [place] fall precautions as indicated by assessment   - Educate patient/family on patient safety including physical limitations  - Instruct patient to call for assistance with activity based on assessment  - Modify environment to reduce risk of injury  - Consider OT/PT consult to assist with strengthening/mobility  Outcome: Progressing     Problem: NEUROSENSORY - ADULT  Goal: Achieves stable or improved neurological status  Description: INTERVENTIONS  - Monitor and report changes in neurological status  - Monitor vital signs such as temperature, blood pressure, glucose, and any other labs ordered   - Initiate measures to prevent increased intracranial pressure  - Monitor for seizure activity and implement precautions if appropriate      Outcome: Progressing  Goal: Remains free of injury related to seizures activity  Description: INTERVENTIONS  - Maintain airway, patient safety  and administer oxygen as ordered  - Monitor patient for seizure activity, document and report duration and description of seizure to physician/advanced practitioner  - If seizure occurs,  ensure patient safety during seizure  - Reorient patient post seizure  - Seizure pads on all 4 side rails  - Instruct patient/family to notify RN of any seizure activity including if an aura is experienced  - Instruct patient/family to call for assistance with activity based on nursing assessment  - Administer anti-seizure medications if ordered    Outcome: Progressing  Goal: Achieves maximal functionality and self care  Description: INTERVENTIONS  - Monitor swallowing and airway patency with patient fatigue and changes in neurological status  - Encourage and assist patient to increase activity and self care     - Encourage visually impaired, hearing impaired and aphasic patients to use assistive/communication devices  Outcome: Progressing     Problem: CARDIOVASCULAR - ADULT  Goal: Maintains optimal cardiac output and hemodynamic stability  Description: INTERVENTIONS:  - Monitor I/O, vital signs and rhythm  - Monitor for S/S and trends of decreased cardiac output  - Administer and titrate ordered vasoactive medications to optimize hemodynamic stability  - Assess quality of pulses, skin color and temperature  - Assess for signs of decreased coronary artery perfusion  - Instruct patient to report change in severity of symptoms  Outcome: Progressing  Goal: Absence of cardiac dysrhythmias or at baseline rhythm  Description: INTERVENTIONS:  - Continuous cardiac monitoring, vital signs, obtain 12 lead EKG if ordered  - Administer antiarrhythmic and heart rate control medications as ordered  - Monitor electrolytes and administer replacement therapy as ordered  Outcome: Progressing     Problem: RESPIRATORY - ADULT  Goal: Achieves optimal ventilation and oxygenation  Description: INTERVENTIONS:  - Assess for changes in respiratory status  - Assess for changes in mentation and behavior  - Position to facilitate oxygenation and minimize respiratory effort  - Oxygen administered by appropriate delivery if ordered  - Initiate smoking cessation education as indicated  - Encourage broncho-pulmonary hygiene including cough, deep breathe, Incentive Spirometry  - Assess the need for suctioning and aspirate as needed  - Assess and instruct to report SOB or any respiratory difficulty  - Respiratory Therapy support as indicated  Outcome: Progressing     Problem: GASTROINTESTINAL - ADULT  Goal: Minimal or absence of nausea and/or vomiting  Description: INTERVENTIONS:  - Administer IV fluids if ordered to ensure adequate hydration  - Maintain NPO status until nausea and vomiting are resolved  - Nasogastric tube if ordered  - Administer ordered antiemetic medications as needed  - Provide nonpharmacologic comfort measures as appropriate  - Advance diet as tolerated, if ordered  - Consider nutrition services referral to assist patient with adequate nutrition and appropriate food choices  Outcome: Progressing  Goal: Maintains or returns to baseline bowel function  Description: INTERVENTIONS:  - Assess bowel function  - Encourage oral fluids to ensure adequate hydration  - Administer IV fluids if ordered to ensure adequate hydration  - Administer ordered medications as needed  - Encourage mobilization and activity  - Consider nutritional services referral to assist patient with adequate nutrition and appropriate food choices  Outcome: Progressing  Goal: Maintains adequate nutritional intake  Description: INTERVENTIONS:  - Monitor percentage of each meal consumed  - Identify factors contributing to decreased intake, treat as appropriate  - Assist with meals as needed  - Monitor I&O, weight, and lab values if indicated  - Obtain nutrition services referral as needed  Outcome: Progressing     Problem: GENITOURINARY - ADULT  Goal: Maintains or returns to baseline urinary function  Description: INTERVENTIONS:  - Assess urinary function  - Encourage oral fluids to ensure adequate hydration if ordered  - Administer IV fluids as ordered to ensure adequate hydration  - Administer ordered medications as needed  - Offer frequent toileting  - Follow urinary retention protocol if ordered  Outcome: Progressing     Problem: METABOLIC, FLUID AND ELECTROLYTES - ADULT  Goal: Electrolytes maintained within normal limits  Description: INTERVENTIONS:  - Monitor labs and assess patient for signs and symptoms of electrolyte imbalances  - Administer electrolyte replacement as ordered  - Monitor response to electrolyte replacements, including repeat lab results as appropriate  - Instruct patient on fluid and nutrition as appropriate  Outcome: Progressing  Goal: Fluid balance maintained  Description: INTERVENTIONS:  - Monitor labs   - Monitor I/O and WT  - Instruct patient on fluid and nutrition as appropriate  - Assess for signs & symptoms of volume excess or deficit  Outcome: Progressing     Problem: SKIN/TISSUE INTEGRITY - ADULT  Goal: Skin integrity remains intact  Description: INTERVENTIONS  - Identify patients at risk for skin breakdown  - Assess and monitor skin integrity  - Assess and monitor nutrition and hydration status  - Monitor labs (i e  albumin)  - Assess for incontinence   - Turn and reposition patient  - Assist with mobility/ambulation  - Relieve pressure over bony prominences  - Avoid friction and shearing  - Provide appropriate hygiene as needed including keeping skin clean and dry  - Evaluate need for skin moisturizer/barrier cream  - Collaborate with interdisciplinary team (i e  Nutrition, Rehabilitation, etc )   - Patient/family teaching  Outcome: Progressing     Problem: MUSCULOSKELETAL - ADULT  Goal: Maintain or return mobility to safest level of function  Description: INTERVENTIONS:  - Assess patient's ability to carry out ADLs; assess patient's baseline for ADL function and identify physical deficits which impact ability to perform ADLs (bathing, care of mouth/teeth, toileting, grooming, dressing, etc )  - Assess/evaluate cause of self-care deficits   - Assess range of motion  - Assess patient's mobility  - Assess patient's need for assistive devices and provide as appropriate  - Encourage maximum independence but intervene and supervise when necessary  - Involve family in performance of ADLs  - Assess for home care needs following discharge   - Consider OT consult to assist with ADL evaluation and planning for discharge  - Provide patient education as appropriate  Outcome: Progressing     Problem: PAIN - ADULT  Goal: Verbalizes/displays adequate comfort level or baseline comfort level  Description: Interventions:  - Encourage patient to monitor pain and request assistance  - Assess pain using appropriate pain scale  - Administer analgesics based on type and severity of pain and evaluate response  - Implement non-pharmacological measures as appropriate and evaluate response  - Consider cultural and social influences on pain and pain management  - Notify physician/advanced practitioner if interventions unsuccessful or patient reports new pain  Outcome: Progressing     Problem: INFECTION - ADULT  Goal: Absence or prevention of progression during hospitalization  Description: INTERVENTIONS:  - Assess and monitor for signs and symptoms of infection  - Monitor lab/diagnostic results  - Monitor all insertion sites, i e  indwelling lines, tubes, and drains  - Monitor endotracheal if appropriate and nasal secretions for changes in amount and color  - [place] appropriate cooling/warming therapies per order  - Administer medications as ordered  - Instruct and encourage patient and family to use good hand hygiene technique  - Identify and instruct in appropriate isolation precautions for identified infection/condition  Outcome: Progressing     Problem: SAFETY ADULT  Goal: Patient will remain free of falls  Description: INTERVENTIONS:  - Assess patient frequently for physical needs  -  Identify cognitive and physical deficits and behaviors that affect risk of falls    -  [place] fall precautions as indicated by assessment   - Educate patient/family on patient safety including physical limitations  - Instruct patient to call for assistance with activity based on assessment  - Modify environment to reduce risk of injury  - Consider OT/PT consult to assist with strengthening/mobility  Outcome: Progressing  Goal: Maintain or return to baseline ADL function  Description: INTERVENTIONS:  -  Assess patient's ability to carry out ADLs; assess patient's baseline for ADL function and identify physical deficits which impact ability to perform ADLs (bathing, care of mouth/teeth, toileting, grooming, dressing, etc )  - Assess/evaluate cause of self-care deficits   - Assess range of motion  - Assess patient's mobility; develop plan if impaired  - Assess patient's need for assistive devices and provide as appropriate  - Encourage maximum independence but intervene and supervise when necessary  - Involve family in performance of ADLs  - Assess for home care needs following discharge   - Consider OT consult to assist with ADL evaluation and planning for discharge  - Provide patient education as appropriate  Outcome: Progressing  Goal: Maintain or return mobility status to optimal level  Description: INTERVENTIONS:  - Assess patient's baseline mobility status (ambulation, transfers, stairs, etc )    - Identify cognitive and physical deficits and behaviors that affect mobility  - Identify mobility aids required to assist with transfers and/or ambulation (gait belt, sit-to-stand, lift, walker, cane, etc )  - [place] fall precautions as indicated by assessment  - Record patient progress and toleration of activity level on Mobility SBAR; progress patient to next Phase/Stage  - Instruct patient to call for assistance with activity based on assessment  - Consider rehabilitation consult to assist with strengthening/weightbearing, etc   Outcome: Progressing     Problem: DISCHARGE PLANNING  Goal: Discharge to home or other facility with appropriate resources  Description: INTERVENTIONS:  - Identify barriers to discharge w/patient and caregiver  - Arrange for needed discharge resources and transportation as appropriate  - Identify discharge learning needs (meds, wound care, etc )  - Arrange for interpretive services to assist at discharge as needed  - Refer to Case Management Department for coordinating discharge planning if the patient needs post-hospital services based on physician/advanced practitioner order or complex needs related to functional status, cognitive ability, or social support system  Outcome: Progressing     Problem: Knowledge Deficit  Goal: Patient/family/caregiver demonstrates understanding of disease process, treatment plan, medications, and discharge instructions  Description: Complete learning assessment and assess knowledge base  Interventions:  - Provide teaching at level of understanding  - Provide teaching via preferred learning methods  Outcome: Progressing     Problem: Nutrition/Hydration-ADULT  Goal: Nutrient/Hydration intake appropriate for improving, restoring or maintaining nutritional needs  Description: Monitor and assess patient's nutrition/hydration status for malnutrition  Collaborate with interdisciplinary team and initiate plan and interventions as ordered  Monitor patient's weight and dietary intake as ordered or per policy  Utilize nutrition screening tool and intervene as necessary  Determine patient's food preferences and provide high-protein, high-caloric foods as appropriate       INTERVENTIONS:  - Monitor oral intake, urinary output, labs, and treatment plans  - Assess nutrition and hydration status and recommend course of action  - Evaluate amount of meals eaten  - Assist patient with eating if necessary   - Allow adequate time for meals  - Recommend/ encourage appropriate diets, oral nutritional supplements, and vitamin/mineral supplements  - Order, calculate, and assess calorie counts as needed  - Recommend, monitor, and adjust tube feedings and TPN/PPN based on assessed needs  - Assess need for intravenous fluids  - Provide specific nutrition/hydration education as appropriate  - Include patient/family/caregiver in decisions related to nutrition  Outcome: Progressing

## 2021-05-05 NOTE — ASSESSMENT & PLAN NOTE
· Presents with congestion, myalgia, nausea, and fatigue  · All household members have [place]  · COVID testing:  Positive symptoms since Thursday 4/29 as discussed with the daughter suspect could be more that is wise discussed with the daughter will proceed with quarantine after she tested positive from that date which is 5 /3  · Not requiring supplemental oxygen:  Saturation is 95% on room air  · CTA chest PE study: Extensive multifocal groundglass opacities correlating to the history of Covid-19 pneumonia/pneumonitis  No PE  · BNP 20 80, , trop <0 02  · CRP 43 D-dimer 1 12 ferritin is normal  · Procalcitonin and blood cultures pending  · Oxygen protocol  · Two procalcitonin is are negative discontinue antibiotics  · Remdesivir 3/5  · Vitamin-C vitamin-D zinc  · Respiratory protocol  · Heparin  · CRP slightly went up she had a fever but lower than before  Patient is feeling better no diarrhea eating better no shortness of breath the cough is not as bad  · Placed on Tessalon Perles p r n

## 2021-05-05 NOTE — PROGRESS NOTES
Pt with reported poor appetite x 1 week PTA, was only eating soft foods like chicken noodle soup, toast, oatmeal during this time  Reports she weighed herself at home, could not read the scale weight but thought it said 170lb, says she is not sure of this weight, would benefit from current weight as able  Minimal physical exam at this time due to COVID isolation though does not appear malnourished  Does not meet criteria for acute malnutrition  Pt reports milk causing nausea, is willing to try ensure however  Will trial daily  Pt says diarrhea has now resolved  Will also trial ensure pudding daily  Will d/c fat restriction, maintain Na restriction given HTN

## 2021-05-05 NOTE — ASSESSMENT & PLAN NOTE
· On CKD stage 3 Creatinine 1 82  Baseline around 1 4 -1 5 from [facility] records   Suspect multifactorial due to use of Ace inhibitor and decreased oral intake with acute diarrhea resolved no diarrhea  · Hold diuretic and Ace  · DC fluid  · Daily metabolic panel and trend creatinine

## 2021-05-05 NOTE — NURSING NOTE
Patient's masimo alarming constantly that her HR is in the 40's  Her fingers are cool to touch  HR apically is really in the 50's  Will monitor  Dr Arlene Smith aware  Patient had the corgard earlier  Pulse is not really in 40's  Hr currently 59

## 2021-05-05 NOTE — PROGRESS NOTES
[address]  Progress Note - Malgorzata Mims 1940, 78 y o  female MRN: [id number]  Unit/Bed#: -01 Encounter: [id number]  Primary Care Provider: Moody Bucio MD   Date and time admitted to hospital: 5/3/2021  6:51 PM    * Pneumonia due to COVID-19 virus  Assessment & Plan  · Presents with congestion, myalgia, nausea, and fatigue  · All household members have COVID  · COVID testing:  Positive symptoms since Thursday 4/29 as discussed with the daughter suspect could be more that is wise discussed with the daughter will proceed with quarantine after she tested positive from that date which is 5 /3  · Not requiring supplemental oxygen:  Saturation is 95% on room air  · CTA chest PE study: Extensive multifocal groundglass opacities correlating to the history of Covid-19 pneumonia/pneumonitis  No PE  · BNP 20 80, , trop <0 02  · CRP 43 D-dimer 1 12 ferritin is normal  · Procalcitonin and blood cultures pending  · Oxygen protocol  · Two procalcitonin is are negative discontinue antibiotics  · Remdesivir 3/5  · Vitamin-C vitamin-D zinc  · Respiratory protocol  · Heparin  · CRP slightly went up she had a fever but lower than before  Patient is feeling better no diarrhea eating better no shortness of breath the cough is not as bad  · Placed on Tessalon Perles p r n  Acute infectious diarrhea  Assessment & Plan  · Secondary COVID-19  Resolved post imodium    Hyponatremia  Assessment & Plan  · Sodium 134 secondary to acute diarrhea with adequate intake  Resolved with IV supplementation      Anxiety  Assessment & Plan  · Currently controlled  · Continue Effexor  · Continue Ativan as needed    SUSANA (acute kidney injury) ([facility] [address] )  Assessment & Plan  · On CKD stage 3 Creatinine 1 82  Baseline around 1 4 -1 5 from [address] [facility] records   Suspect multifactorial due to use of Ace inhibitor and decreased oral intake with acute diarrhea resolved no diarrhea  · Hold diuretic and Ace  · DC fluid  · Daily metabolic panel and trend creatinine    Essential hypertension  Assessment & Plan  · BP reviewed  · Continue Corgard   · Hold Maxzide and enalapril d/t SUSANA  · Monitor blood pressure    Gastroesophageal reflux disease without esophagitis  Assessment & Plan  · Currently controlled-takes omeprazole-will be formulary substituted with Protonix  · Monitor      Abnormal finding on CT scan  Assessment & Plan  · CTA chest: Scattered splenic, hepatic and bilateral hilar calcified granulomata  · Outpatient follow-up if indicated           VTE Pharmacologic Prophylaxis:   Pharmacologic: Heparin  Mechanical VTE Prophylaxis in Place: Yes    Patient Centered Rounds: I have performed bedside rounds with nursing staff today  Discussions with Specialists or Other Care Team Provider:  Discussed with case management    Education and Discussions with Family / Patient:  Patient I updated  daughter    Time Spent for Care: 30 minutes  More than 50% of total time spent on counseling and coordination of care as described above  Current Length of Stay: 2 day(s)    Current Patient Status: Inpatient   Certification Statement: The patient will continue to require additional inpatient hospital stay due to Secondary to COVID-19 pneumonia    Discharge Plan:  Anticipate in 48 hours    Code Status: Level 1 - Full Code      Subjective:   Patient seen and examined feeling much better no shortness of breath cough has improved diarrhea eating better    Objective:     Vitals:   Temp (24hrs), Av 5 °F (37 5 °C), Min:97 6 °F (36 4 °C), Max:100 6 °F (38 1 °C)    Temp:  [97 6 °F (36 4 °C)-100 6 °F (38 1 °C)] 97 6 °F (36 4 °C)  HR:  [51-64] 51  Resp:  [18-20] 20  BP: (153-183)/(56-73) 154/56  SpO2:  [92 %-94 %] 93 %  Body mass index is 29 19 kg/m²  Input and Output Summary (last 24 hours):        Intake/Output Summary (Last 24 hours) at 2021 1249  Last data filed at 2021 1128  Gross per 24 hour   Intake 1321 67 ml   Output 950 ml   Net 371 67 ml       Physical Exam:     Physical Exam  Vitals signs and nursing note reviewed  Constitutional:       General: She is not in acute distress  Appearance: She is well-developed  HENT:      Head: Normocephalic and atraumatic  Eyes:      Conjunctiva/sclera: Conjunctivae normal    Neck:      Musculoskeletal: Neck supple  Cardiovascular:      Rate and Rhythm: Normal rate and regular rhythm  Heart sounds: No murmur  Pulmonary:      Effort: Pulmonary effort is normal  No respiratory distress  Comments: Decreased bs  Abdominal:      Palpations: Abdomen is soft  Tenderness: There is no abdominal tenderness  Musculoskeletal:         General: No swelling  Skin:     General: Skin is warm and dry  Neurological:      General: No focal deficit present  Mental Status: She is alert and oriented to person, place, and time  Psychiatric:         Mood and Affect: Mood normal            Additional Data:     Labs:    Results from last 7 days   Lab Units 05/05/21  0520   WBC Thousand/uL 5 68   HEMOGLOBIN g/dL 10 2*   HEMATOCRIT % 32 8*   PLATELETS Thousands/uL 141*   NEUTROS PCT % 39*   LYMPHS PCT % 50*   MONOS PCT % 9   EOS PCT % 1     Results from last 7 days   Lab Units 05/05/21  0520   SODIUM mmol/L 135*   POTASSIUM mmol/L 4 2   CHLORIDE mmol/L 105   CO2 mmol/L 17*   BUN mg/dL 35*   CREATININE mg/dL 1 52*   ANION GAP mmol/L 13   CALCIUM mg/dL 7 5*   ALBUMIN g/dL 2 8*   TOTAL BILIRUBIN mg/dL 0 39   ALK PHOS U/L 51   ALT U/L 14   AST U/L 21   GLUCOSE RANDOM mg/dL 84                 Results from last 7 days   Lab Units 05/05/21  0520 05/03/21  2205 05/03/21  1926   LACTIC ACID mmol/L  --   --  1 5   PROCALCITONIN ng/ml <0 05 0 06  --            * I Have Reviewed All Lab Data Listed Above  * Additional Pertinent Lab Tests Reviewed:  All Labs Within Last 24 Hours Reviewed    Imaging:    Imaging Reports Reviewed Today Include: none today  Imaging Personally Reviewed by Myself Includes:      Recent Cultures (last 7 days):     Results from last 7 days   Lab Units 05/04/21  0021   BLOOD CULTURE  No Growth at 24 hrs  No Growth at 24 hrs  Last 24 Hours Medication List:   Current Facility-Administered Medications   Medication Dose Route Frequency Provider Last Rate    acetaminophen  650 mg Oral Q6H PRN Erick Child, CRNP      ascorbic acid  1,000 mg Oral Q12H [facility] [place] Erick Child, CRNP      atorvastatin  20 mg Oral Daily Erick Child, CRNP      benzonatate  100 mg Oral TID PRN Rakesh Verdugo MD      cholecalciferol  2,000 Units Oral Daily Erick Child, CRNP      fluticasone  1 spray Nasal Daily Erick Child, CRNP      heparin (porcine)  5,000 Units Subcutaneous [facility] Erick Child, CRNP      LORazepam  0 5 mg Oral HS Erick Child, CRNP      zinc sulfate  220 mg Oral Daily Erick Child, CRNP      Followed by   Sharon Singh ON 5/11/2021] multivitamin-minerals  1 tablet Oral Daily Erick Child, CRNP      nadolol  40 mg Oral Daily Erick Child, CRNP      pantoprazole  40 mg Oral Early Morning Erick Child, CRNP      remdesivir  100 mg Intravenous Q24H Erick Child, CRNP 100 mg (05/04/21 2248)    venlafaxine  37 5 mg Oral Daily Erick Child, CRNP          Today, Patient Was Seen By: Rakesh Verdugo MD    ** Please Note: Dictation voice to text software may have been used in the creation of this document   **

## 2021-05-06 LAB
ALBUMIN SERPL BCP-MCNC: 2.9 G/DL (ref 3.5–5)
ALP SERPL-CCNC: 56 U/L (ref 46–116)
ALT SERPL W P-5'-P-CCNC: 15 U/L (ref 12–78)
ANION GAP SERPL CALCULATED.3IONS-SCNC: 12 MMOL/L (ref 4–13)
AST SERPL W P-5'-P-CCNC: 20 U/L (ref 5–45)
BASOPHILS # BLD AUTO: 0.03 THOUSANDS/ΜL (ref 0–0.1)
BASOPHILS NFR BLD AUTO: 0 % (ref 0–1)
BILIRUB SERPL-MCNC: 0.37 MG/DL (ref 0.2–1)
BUN SERPL-MCNC: 31 MG/DL (ref 5–25)
CALCIUM ALBUM COR SERPL-MCNC: 9 MG/DL (ref 8.3–10.1)
CALCIUM SERPL-MCNC: 8.1 MG/DL (ref 8.3–10.1)
CHLORIDE SERPL-SCNC: 105 MMOL/L (ref 100–108)
CO2 SERPL-SCNC: 20 MMOL/L (ref 21–32)
CREAT SERPL-MCNC: 1.38 MG/DL (ref 0.6–1.3)
CRP SERPL QL: 83.3 MG/L
EOSINOPHIL # BLD AUTO: 0.11 THOUSAND/ΜL (ref 0–0.61)
EOSINOPHIL NFR BLD AUTO: 2 % (ref 0–6)
ERYTHROCYTE [DISTWIDTH] IN BLOOD BY AUTOMATED COUNT: 13.7 % (ref 11.6–15.1)
FERRITIN SERPL-MCNC: 254 NG/ML (ref 8–388)
GFR SERPL CREATININE-BSD FRML MDRD: 36 ML/MIN/1.73SQ M
GLUCOSE SERPL-MCNC: 97 MG/DL (ref 65–140)
HCT VFR BLD AUTO: 34.2 % (ref 34.8–46.1)
HGB BLD-MCNC: 10.8 G/DL (ref 11.5–15.4)
IMM GRANULOCYTES # BLD AUTO: 0.04 THOUSAND/UL (ref 0–0.2)
IMM GRANULOCYTES NFR BLD AUTO: 1 % (ref 0–2)
LIPASE SERPL-CCNC: 158 U/L (ref 73–393)
LYMPHOCYTES # BLD AUTO: 2.86 THOUSANDS/ΜL (ref 0.6–4.47)
LYMPHOCYTES NFR BLD AUTO: 41 % (ref 14–44)
MCH RBC QN AUTO: 29.1 PG (ref 26.8–34.3)
MCHC RBC AUTO-ENTMCNC: 31.6 G/DL (ref 31.4–37.4)
MCV RBC AUTO: 92 FL (ref 82–98)
MONOCYTES # BLD AUTO: 0.7 THOUSAND/ΜL (ref 0.17–1.22)
MONOCYTES NFR BLD AUTO: 10 % (ref 4–12)
NEUTROPHILS # BLD AUTO: 3.16 THOUSANDS/ΜL (ref 1.85–7.62)
NEUTS SEG NFR BLD AUTO: 46 % (ref 43–75)
NRBC BLD AUTO-RTO: 0 /100 WBCS
PLATELET # BLD AUTO: 157 THOUSANDS/UL (ref 149–390)
PMV BLD AUTO: 11.7 FL (ref 8.9–12.7)
POTASSIUM SERPL-SCNC: 4.2 MMOL/L (ref 3.5–5.3)
PROT SERPL-MCNC: 6.3 G/DL (ref 6.4–8.2)
RBC # BLD AUTO: 3.71 MILLION/UL (ref 3.81–5.12)
SODIUM SERPL-SCNC: 137 MMOL/L (ref 136–145)
WBC # BLD AUTO: 6.9 THOUSAND/UL (ref 4.31–10.16)

## 2021-05-06 PROCEDURE — 85025 COMPLETE CBC W/AUTO DIFF WBC: CPT | Performed by: FAMILY MEDICINE

## 2021-05-06 PROCEDURE — 86140 C-REACTIVE PROTEIN: CPT | Performed by: FAMILY MEDICINE

## 2021-05-06 PROCEDURE — 83690 ASSAY OF LIPASE: CPT | Performed by: FAMILY MEDICINE

## 2021-05-06 PROCEDURE — 82728 ASSAY OF FERRITIN: CPT | Performed by: FAMILY MEDICINE

## 2021-05-06 PROCEDURE — 80053 COMPREHEN METABOLIC PANEL: CPT | Performed by: FAMILY MEDICINE

## 2021-05-06 PROCEDURE — 99232 SBSQ HOSP IP/OBS MODERATE 35: CPT | Performed by: FAMILY MEDICINE

## 2021-05-06 RX ORDER — NADOLOL 20 MG/1
20 TABLET ORAL DAILY
Status: DISCONTINUED | OUTPATIENT
Start: 2021-05-07 | End: 2021-05-07 | Stop reason: HOSPADM

## 2021-05-06 RX ORDER — AMLODIPINE BESYLATE 5 MG/1
5 TABLET ORAL DAILY
Status: DISCONTINUED | OUTPATIENT
Start: 2021-05-06 | End: 2021-05-07 | Stop reason: HOSPADM

## 2021-05-06 RX ORDER — METOCLOPRAMIDE HYDROCHLORIDE 5 MG/ML
5 INJECTION INTRAMUSCULAR; INTRAVENOUS ONCE
Status: COMPLETED | OUTPATIENT
Start: 2021-05-06 | End: 2021-05-06

## 2021-05-06 RX ADMIN — ATORVASTATIN CALCIUM 20 MG: 20 TABLET, FILM COATED ORAL at 17:13

## 2021-05-06 RX ADMIN — METOCLOPRAMIDE HYDROCHLORIDE 5 MG: 5 INJECTION INTRAMUSCULAR; INTRAVENOUS at 10:33

## 2021-05-06 RX ADMIN — BENZONATATE 100 MG: 100 CAPSULE ORAL at 23:31

## 2021-05-06 RX ADMIN — REMDESIVIR 100 MG: 100 INJECTION, POWDER, LYOPHILIZED, FOR SOLUTION INTRAVENOUS at 23:30

## 2021-05-06 RX ADMIN — AMLODIPINE BESYLATE 5 MG: 5 TABLET ORAL at 13:40

## 2021-05-06 RX ADMIN — ZINC SULFATE 220 MG (50 MG) CAPSULE 220 MG: CAPSULE at 08:32

## 2021-05-06 RX ADMIN — FLUTICASONE PROPIONATE 1 SPRAY: 50 SPRAY, METERED NASAL at 08:32

## 2021-05-06 RX ADMIN — VENLAFAXINE HYDROCHLORIDE 37.5 MG: 37.5 CAPSULE, EXTENDED RELEASE ORAL at 08:31

## 2021-05-06 RX ADMIN — DICLOFENAC SODIUM 2 G: 10 GEL TOPICAL at 13:39

## 2021-05-06 RX ADMIN — PANTOPRAZOLE SODIUM 40 MG: 40 TABLET, DELAYED RELEASE ORAL at 05:08

## 2021-05-06 RX ADMIN — OXYCODONE HYDROCHLORIDE AND ACETAMINOPHEN 1000 MG: 500 TABLET ORAL at 22:02

## 2021-05-06 RX ADMIN — Medication 2000 UNITS: at 08:31

## 2021-05-06 RX ADMIN — LORAZEPAM 0.5 MG: 0.5 TABLET ORAL at 22:02

## 2021-05-06 RX ADMIN — OXYCODONE HYDROCHLORIDE AND ACETAMINOPHEN 1000 MG: 500 TABLET ORAL at 08:32

## 2021-05-06 RX ADMIN — HEPARIN SODIUM 5000 UNITS: 5000 INJECTION INTRAVENOUS; SUBCUTANEOUS at 22:02

## 2021-05-06 RX ADMIN — HEPARIN SODIUM 5000 UNITS: 5000 INJECTION INTRAVENOUS; SUBCUTANEOUS at 13:40

## 2021-05-06 RX ADMIN — HEPARIN SODIUM 5000 UNITS: 5000 INJECTION INTRAVENOUS; SUBCUTANEOUS at 05:09

## 2021-05-06 RX ADMIN — NADOLOL 40 MG: 20 TABLET ORAL at 08:35

## 2021-05-06 NOTE — ASSESSMENT & PLAN NOTE
· Presents with congestion, myalgia, nausea, and fatigue  · All household members have Benito  · COVID testing:  Positive symptoms since Thursday 4/29 as discussed with the daughter suspect could be more that is wise discussed with the daughter will proceed with quarantine after she tested positive from that date which is 5 /3  · Not requiring supplemental oxygen:  Saturation is 95% on room air  · CTA chest PE study: Extensive multifocal groundglass opacities correlating to the history of Covid-19 pneumonia/pneumonitis  No PE  · BNP 20 80, , trop <0 02  · CRP 43 D-dimer 1 12 ferritin is normal  · Procalcitonin and blood cultures pending  · Oxygen protocol  · Two procalcitonin is are negative discontinue antibiotics  · Remdesivir 4/5  · Vitamin-C vitamin-D zinc  · Respiratory protocol  · Heparin  · Had 1 episode of fever yesterday for now no fevers but today the patient is feeling nauseous and she actually vomited possible side effect of remdesivir but I discussed with her I could control her symptoms with some antinausea medicines she is having it only intermittently I have seen for 2 days in the morning and to continue the remdesivir for another day  Discussed with the daughter the same will provide her some Reglan  She is not short of breath she does not have any diarrhea some abdominal soreness could be secondary from the vomiting will check a lipase  · Placed on Tessalon Perles p r n

## 2021-05-06 NOTE — PLAN OF CARE
Problem: Potential for Falls  Goal: Patient will remain free of falls  Description: INTERVENTIONS:  - Assess patient frequently for physical needs  -  Identify cognitive and physical deficits and behaviors that affect risk of falls  -  [place] fall precautions as indicated by assessment   - Educate patient/family on patient safety including physical limitations  - Instruct patient to call for assistance with activity based on assessment  - Modify environment to reduce risk of injury  - Consider OT/PT consult to assist with strengthening/mobility  Outcome: Progressing     Problem: NEUROSENSORY - ADULT  Goal: Achieves stable or improved neurological status  Description: INTERVENTIONS  - Monitor and report changes in neurological status  - Monitor vital signs such as temperature, blood pressure, glucose, and any other labs ordered   - Initiate measures to prevent increased intracranial pressure  - Monitor for seizure activity and implement precautions if appropriate      Outcome: Progressing  Goal: Achieves maximal functionality and self care  Description: INTERVENTIONS  - Monitor swallowing and airway patency with patient fatigue and changes in neurological status  - Encourage and assist patient to increase activity and self care     - Encourage visually impaired, hearing impaired and aphasic patients to use assistive/communication devices  Outcome: Progressing     Problem: CARDIOVASCULAR - ADULT  Goal: Maintains optimal cardiac output and hemodynamic stability  Description: INTERVENTIONS:  - Monitor I/O, vital signs and rhythm  - Monitor for S/S and trends of decreased cardiac output  - Administer and titrate ordered vasoactive medications to optimize hemodynamic stability  - Assess quality of pulses, skin color and temperature  - Assess for signs of decreased coronary artery perfusion  - Instruct patient to report change in severity of symptoms  Outcome: Progressing  Goal: Absence of cardiac dysrhythmias or at baseline rhythm  Description: INTERVENTIONS:  - Continuous cardiac monitoring, vital signs, obtain 12 lead EKG if ordered  - Administer antiarrhythmic and heart rate control medications as ordered  - Monitor electrolytes and administer replacement therapy as ordered  Outcome: Progressing     Problem: RESPIRATORY - ADULT  Goal: Achieves optimal ventilation and oxygenation  Description: INTERVENTIONS:  - Assess for changes in respiratory status  - Assess for changes in mentation and behavior  - Position to facilitate oxygenation and minimize respiratory effort  - Oxygen administered by appropriate delivery if ordered  - Initiate smoking cessation education as indicated  - Encourage broncho-pulmonary hygiene including cough, deep breathe, Incentive Spirometry  - Assess the need for suctioning and aspirate as needed  - Assess and instruct to report SOB or any respiratory difficulty  - Respiratory Therapy support as indicated  Outcome: Progressing     Problem: GASTROINTESTINAL - ADULT  Goal: Minimal or absence of nausea and/or vomiting  Description: INTERVENTIONS:  - Administer IV fluids if ordered to ensure adequate hydration  - Maintain NPO status until nausea and vomiting are resolved  - Nasogastric tube if ordered  - Administer ordered antiemetic medications as needed  - Provide nonpharmacologic comfort measures as appropriate  - Advance diet as tolerated, if ordered  - Consider nutrition services referral to assist patient with adequate nutrition and appropriate food choices  Outcome: Progressing  Goal: Maintains or returns to baseline bowel function  Description: INTERVENTIONS:  - Assess bowel function  - Encourage oral fluids to ensure adequate hydration  - Administer IV fluids if ordered to ensure adequate hydration  - Administer ordered medications as needed  - Encourage mobilization and activity  - Consider nutritional services referral to assist patient with adequate nutrition and appropriate food choices  Outcome: Progressing  Goal: Maintains adequate nutritional intake  Description: INTERVENTIONS:  - Monitor percentage of each meal consumed  - Identify factors contributing to decreased intake, treat as appropriate  - Assist with meals as needed  - Monitor I&O, weight, and lab values if indicated  - Obtain nutrition services referral as needed  Outcome: Progressing     Problem: GENITOURINARY - ADULT  Goal: Maintains or returns to baseline urinary function  Description: INTERVENTIONS:  - Assess urinary function  - Encourage oral fluids to ensure adequate hydration if ordered  - Administer IV fluids as ordered to ensure adequate hydration  - Administer ordered medications as needed  - Offer frequent toileting  - Follow urinary retention protocol if ordered  Outcome: Progressing     Problem: METABOLIC, FLUID AND ELECTROLYTES - ADULT  Goal: Electrolytes maintained within normal limits  Description: INTERVENTIONS:  - Monitor labs and assess patient for signs and symptoms of electrolyte imbalances  - Administer electrolyte replacement as ordered  - Monitor response to electrolyte replacements, including repeat lab results as appropriate  - Instruct patient on fluid and nutrition as appropriate  Outcome: Progressing  Goal: Fluid balance maintained  Description: INTERVENTIONS:  - Monitor labs   - Monitor I/O and WT  - Instruct patient on fluid and nutrition as appropriate  - Assess for signs & symptoms of volume excess or deficit  Outcome: Progressing     Problem: SKIN/TISSUE INTEGRITY - ADULT  Goal: Skin integrity remains intact  Description: INTERVENTIONS  - Identify patients at risk for skin breakdown  - Assess and monitor skin integrity  - Assess and monitor nutrition and hydration status  - Monitor labs (i e  albumin)  - Assess for incontinence   - Turn and reposition patient  - Assist with mobility/ambulation  - Relieve pressure over bony prominences  - Avoid friction and shearing  - Provide appropriate hygiene as needed including keeping skin clean and dry  - Evaluate need for skin moisturizer/barrier cream  - Collaborate with interdisciplinary team (i e  Nutrition, Rehabilitation, etc )   - Patient/family teaching  Outcome: Progressing     Problem: MUSCULOSKELETAL - ADULT  Goal: Maintain or return mobility to safest level of function  Description: INTERVENTIONS:  - Assess patient's ability to carry out ADLs; assess patient's baseline for ADL function and identify physical deficits which impact ability to perform ADLs (bathing, care of mouth/teeth, toileting, grooming, dressing, etc )  - Assess/evaluate cause of self-care deficits   - Assess range of motion  - Assess patient's mobility  - Assess patient's need for assistive devices and provide as appropriate  - Encourage maximum independence but intervene and supervise when necessary  - Involve family in performance of ADLs  - Assess for home care needs following discharge   - Consider OT consult to assist with ADL evaluation and planning for discharge  - Provide patient education as appropriate  Outcome: Progressing     Problem: PAIN - ADULT  Goal: Verbalizes/displays adequate comfort level or baseline comfort level  Description: Interventions:  - Encourage patient to monitor pain and request assistance  - Assess pain using appropriate pain scale  - Administer analgesics based on type and severity of pain and evaluate response  - Implement non-pharmacological measures as appropriate and evaluate response  - Consider cultural and social influences on pain and pain management  - Notify physician/advanced practitioner if interventions unsuccessful or patient reports new pain  Outcome: Progressing     Problem: INFECTION - ADULT  Goal: Absence or prevention of progression during hospitalization  Description: INTERVENTIONS:  - Assess and monitor for signs and symptoms of infection  - Monitor lab/diagnostic results  - Monitor all insertion sites, i e  indwelling lines, tubes, and drains  - Monitor endotracheal if appropriate and nasal secretions for changes in amount and color  - [place] appropriate cooling/warming therapies per order  - Administer medications as ordered  - Instruct and encourage patient and family to use good hand hygiene technique  - Identify and instruct in appropriate isolation precautions for identified infection/condition  Outcome: Progressing     Problem: SAFETY ADULT  Goal: Patient will remain free of falls  Description: INTERVENTIONS:  - Assess patient frequently for physical needs  -  Identify cognitive and physical deficits and behaviors that affect risk of falls    -  [place] fall precautions as indicated by assessment   - Educate patient/family on patient safety including physical limitations  - Instruct patient to call for assistance with activity based on assessment  - Modify environment to reduce risk of injury  - Consider OT/PT consult to assist with strengthening/mobility  Outcome: Progressing  Goal: Maintain or return to baseline ADL function  Description: INTERVENTIONS:  -  Assess patient's ability to carry out ADLs; assess patient's baseline for ADL function and identify physical deficits which impact ability to perform ADLs (bathing, care of mouth/teeth, toileting, grooming, dressing, etc )  - Assess/evaluate cause of self-care deficits   - Assess range of motion  - Assess patient's mobility; develop plan if impaired  - Assess patient's need for assistive devices and provide as appropriate  - Encourage maximum independence but intervene and supervise when necessary  - Involve family in performance of ADLs  - Assess for home care needs following discharge   - Consider OT consult to assist with ADL evaluation and planning for discharge  - Provide patient education as appropriate  Outcome: Progressing  Goal: Maintain or return mobility status to optimal level  Description: INTERVENTIONS:  - Assess patient's baseline mobility status (ambulation, transfers, stairs, etc )    - Identify cognitive and physical deficits and behaviors that affect mobility  - Identify mobility aids required to assist with transfers and/or ambulation (gait belt, sit-to-stand, lift, walker, cane, etc )  - [place] fall precautions as indicated by assessment  - Record patient progress and toleration of activity level on Mobility SBAR; progress patient to next Phase/Stage  - Instruct patient to call for assistance with activity based on assessment  - Consider rehabilitation consult to assist with strengthening/weightbearing, etc   Outcome: Progressing     Problem: DISCHARGE PLANNING  Goal: Discharge to home or other facility with appropriate resources  Description: INTERVENTIONS:  - Identify barriers to discharge w/patient and caregiver  - Arrange for needed discharge resources and transportation as appropriate  - Identify discharge learning needs (meds, wound care, etc )  - Arrange for interpretive services to assist at discharge as needed  - Refer to Case Management Department for coordinating discharge planning if the patient needs post-hospital services based on physician/advanced practitioner order or complex needs related to functional status, cognitive ability, or social support system  Outcome: Progressing     Problem: Knowledge Deficit  Goal: Patient/family/caregiver demonstrates understanding of disease process, treatment plan, medications, and discharge instructions  Description: Complete learning assessment and assess knowledge base  Interventions:  - Provide teaching at level of understanding  - Provide teaching via preferred learning methods  Outcome: Progressing     Problem: Nutrition/Hydration-ADULT  Goal: Nutrient/Hydration intake appropriate for improving, restoring or maintaining nutritional needs  Description: Monitor and assess patient's nutrition/hydration status for malnutrition   Collaborate with interdisciplinary team and initiate plan and interventions as ordered  Monitor patient's weight and dietary intake as ordered or per policy  Utilize nutrition screening tool and intervene as necessary  Determine patient's food preferences and provide high-protein, high-caloric foods as appropriate       INTERVENTIONS:  - Monitor oral intake, urinary output, labs, and treatment plans  - Assess nutrition and hydration status and recommend course of action  - Evaluate amount of meals eaten  - Assist patient with eating if necessary   - Allow adequate time for meals  - Recommend/ encourage appropriate diets, oral nutritional supplements, and vitamin/mineral supplements  - Order, calculate, and assess calorie counts as needed  - Recommend, monitor, and adjust tube feedings and TPN/PPN based on assessed needs  - Assess need for intravenous fluids  - Provide specific nutrition/hydration education as appropriate  - Include patient/family/caregiver in decisions related to nutrition  Outcome: Progressing

## 2021-05-06 NOTE — ASSESSMENT & PLAN NOTE
· BP reviewed  · Continue Corgard decreased to 20 mg she is having some sinus bradycardia in the 50  · Hold Maxzide and enalapril d/t SUSANA SUSANA has resolved secondary to her vomiting today will avoid any restarting nephrotoxic blood pressure meds will place her on some Norvasc as the blood pressure is suboptimal  · Monitor blood pressure

## 2021-05-06 NOTE — ASSESSMENT & PLAN NOTE
· On CKD stage 3 Creatinine 1 82  Baseline around 1 4 -1 5 from [facility] records   Suspect multifactorial due to use of Ace inhibitor and decreased oral intake with acute diarrhea resolved no diarrhea  · Hold diuretic and Ace she has some vomiting today I did not want exacerbate worsening creatinine  · Resolved

## 2021-05-06 NOTE — PROGRESS NOTES
[address]  Progress Note - Malgorzata Mims 1940, 78 y o  female MRN: [id number]  Unit/Bed#: -[facility] Encounter: [id number]  Primary Care Provider: Alana Rosales MD   Date and time admitted to hospital: 5/3/2021  6:51 PM    * Pneumonia due to COVID-19 virus  Assessment & Plan  · Presents with congestion, myalgia, nausea, and fatigue  · All household members have COVID  · COVID testing:  Positive symptoms since Thursday 4/29 as discussed with the daughter suspect could be more that is wise discussed with the daughter will proceed with quarantine after she tested positive from that date which is 5 /3  · Not requiring supplemental oxygen:  Saturation is 95% on room air  · CTA chest PE study: Extensive multifocal groundglass opacities correlating to the history of Covid-19 pneumonia/pneumonitis  No PE  · BNP 20 80, , trop <0 02  · CRP 43 D-dimer 1 12 ferritin is normal  · Procalcitonin and blood cultures pending  · Oxygen protocol  · Two procalcitonin is are negative discontinue antibiotics  · Remdesivir 4/5  · Vitamin-C vitamin-D zinc  · Respiratory protocol  · Heparin  · Had 1 episode of fever yesterday for now no fevers but today the patient is feeling nauseous and she actually vomited possible side effect of remdesivir but I discussed with her I could control her symptoms with some antinausea medicines she is having it only intermittently I have seen for 2 days in the morning and to continue the remdesivir for another day  Discussed with the daughter the same will provide her some Reglan  She is not short of breath she does not have any diarrhea some abdominal soreness could be secondary from the vomiting will check a lipase  · Placed on Tessalon Perles p r n  Acute infectious diarrhea  Assessment & Plan  · Secondary COVID-19  Resolved post imodium    Hyponatremia  Assessment & Plan  · Sodium 134 secondary to acute diarrhea with adequate intake    Resolved with IV supplementation      Anxiety  Assessment & Plan  · Currently controlled  · Continue Effexor  · Continue Ativan as needed    SUSANA (acute kidney injury) ([facility] [address] )  Assessment & Plan  · On CKD stage 3 Creatinine 1 82  Baseline around 1 4 -1 5 from [facility] records  Suspect multifactorial due to use of Ace inhibitor and decreased oral intake with acute diarrhea resolved no diarrhea  · Hold diuretic and Ace she has some vomiting today I did not want exacerbate worsening creatinine  · Resolved    Essential hypertension  Assessment & Plan  · BP reviewed  · Continue Corgard decreased to 20 mg she is having some sinus bradycardia in the 50  · Hold Maxzide and enalapril d/t SUSANA SUSANA has resolved secondary to her vomiting today will avoid any restarting nephrotoxic blood pressure meds will place her on some Norvasc as the blood pressure is suboptimal  · Monitor blood pressure    Gastroesophageal reflux disease without esophagitis  Assessment & Plan  · Currently controlled-takes omeprazole-will be formulary substituted with Protonix  · Monitor      Abnormal finding on CT scan  Assessment & Plan  · CTA chest: Scattered splenic, hepatic and bilateral hilar calcified granulomata  · Outpatient follow-up if indicated           VTE Pharmacologic Prophylaxis:   Pharmacologic: Heparin  Mechanical VTE Prophylaxis in Place: Yes    Patient Centered Rounds: I have performed bedside rounds with nursing staff today  Discussions with Specialists or Other Care Team Provider:  Have discussed with nursing    Education and Discussions with Family / Patient:  Patient also updated daughter    Time Spent for Care: 30 minutes  More than 50% of total time spent on counseling and coordination of care as described above      Current Length of Stay: 3 day(s)    Current Patient Status: Inpatient   Certification Statement: The patient will continue to require additional inpatient hospital stay due to Coronavirus pneumonia    Discharge Plan:  Pending progress main 24 hours    Code Status: Level 1 - Full Code      Subjective:   Patient seen and examined she is complaining of nausea and vomited today some abdominal soreness  No diarrhea no chest pain or shortness of breath intermittent cough    Objective:     Vitals:   Temp (24hrs), Av 9 °F (37 2 °C), Min:97 9 °F (36 6 °C), Max:100 7 °F (38 2 °C)    Temp:  [97 9 °F (36 6 °C)-100 7 °F (38 2 °C)] 97 9 °F (36 6 °C)  HR:  [50-61] 50  Resp:  [20-22] 22  BP: (148-171)/(60-75) 166/75  SpO2:  [94 %-95 %] 95 %  Body mass index is 29 19 kg/m²  Input and Output Summary (last 24 hours): Intake/Output Summary (Last 24 hours) at 2021 1211  Last data filed at 2021 1029  Gross per 24 hour   Intake 1743 33 ml   Output 1100 ml   Net 643 33 ml       Physical Exam:     Physical Exam  Vitals signs and nursing note reviewed  Constitutional:       General: She is not in acute distress  Appearance: She is well-developed  HENT:      Head: Normocephalic and atraumatic  Eyes:      Conjunctiva/sclera: Conjunctivae normal    Neck:      Musculoskeletal: Neck supple  Cardiovascular:      Rate and Rhythm: Normal rate and regular rhythm  Heart sounds: No murmur  Pulmonary:      Effort: Pulmonary effort is normal  No respiratory distress  Breath sounds: No wheezing  Comments: Decreased at bases  Abdominal:      General: There is no distension  Palpations: Abdomen is soft  Tenderness: There is no abdominal tenderness  Musculoskeletal:         General: No swelling  Skin:     General: Skin is warm and dry  Neurological:      General: No focal deficit present  Mental Status: She is alert and oriented to person, place, and time     Psychiatric:         Mood and Affect: Mood normal            Additional Data:     Labs:    Results from last 7 days   Lab Units 21  0526   WBC Thousand/uL 6 90   HEMOGLOBIN g/dL 10 8*   HEMATOCRIT % 34 2*   PLATELETS Thousands/uL 157   NEUTROS PCT % 46   LYMPHS PCT % 41   MONOS PCT % 10   EOS PCT % 2     Results from last 7 days   Lab Units 05/06/21  0526   SODIUM mmol/L 137   POTASSIUM mmol/L 4 2   CHLORIDE mmol/L 105   CO2 mmol/L 20*   BUN mg/dL 31*   CREATININE mg/dL 1 38*   ANION GAP mmol/L 12   CALCIUM mg/dL 8 1*   ALBUMIN g/dL 2 9*   TOTAL BILIRUBIN mg/dL 0 37   ALK PHOS U/L 56   ALT U/L 15   AST U/L 20   GLUCOSE RANDOM mg/dL 97                 Results from last 7 days   Lab Units 05/05/21  0520 05/03/21  2205 05/03/21  1926   LACTIC ACID mmol/L  --   --  1 5   PROCALCITONIN ng/ml <0 05 0 06  --            * I Have Reviewed All Lab Data Listed Above  * Additional Pertinent Lab Tests Reviewed: All Labs Within Last 24 Hours Reviewed    Imaging:    Imaging Reports Reviewed Today Include: none today  Imaging Personally Reviewed by Myself Includes:      Recent Cultures (last 7 days):     Results from last 7 days   Lab Units 05/04/21  0021   BLOOD CULTURE  No Growth at 48 hrs  No Growth at 48 hrs         Last 24 Hours Medication List:   Current Facility-Administered Medications   Medication Dose Route Frequency Provider Last Rate    acetaminophen  650 mg Oral Q6H PRN MARTIN Stuart      amLODIPine  5 mg Oral Daily David Dow MD      ascorbic acid  1,000 mg Oral Q12H [facility] MARTIN Stuart      atorvastatin  20 mg Oral Daily MARTIN Stuart      benzonatate  100 mg Oral TID PRN David Dow MD      cholecalciferol  2,000 Units Oral Daily MARTIN Stuart      Diclofenac Sodium  2 g Topical 4x Daily David Dow MD      fluticasone  1 spray Nasal Daily MARTIN Stuart      heparin (porcine)  5,000 Units Subcutaneous [facility] MARTIN Stuart      LORazepam  0 5 mg Oral HS MARTIN Stuart      zinc sulfate  220 mg Oral Daily MARTIN Stuart      Followed by   Laina Sanchez ON 5/11/2021] multivitamin-minerals  1 tablet Oral Daily MARTIN Stuart      [START ON 5/7/2021] nadolol  20 mg Oral Daily Young Arce MD      pantoprazole  40 mg Oral Early Morning MARTIN Michael      remdesivir  100 mg Intravenous Q24H MARTIN Michael 100 mg (05/05/21 2230)    venlafaxine  37 5 mg Oral Daily MARTIN Michael          Today, Patient Was Seen By: Young Arce MD    ** Please Note: Dictation voice to text software may have been used in the creation of this document   **

## 2021-05-06 NOTE — PLAN OF CARE
Problem: Potential for Falls  Goal: Patient will remain free of falls  Description: INTERVENTIONS:  - Assess patient frequently for physical needs  -  Identify cognitive and physical deficits and behaviors that affect risk of falls  -  [place] fall precautions as indicated by assessment   - Educate patient/family on patient safety including physical limitations  - Instruct patient to call for assistance with activity based on assessment  - Modify environment to reduce risk of injury  - Consider OT/PT consult to assist with strengthening/mobility  Outcome: Progressing     Problem: NEUROSENSORY - ADULT  Goal: Achieves stable or improved neurological status  Description: INTERVENTIONS  - Monitor and report changes in neurological status  - Monitor vital signs such as temperature, blood pressure, glucose, and any other labs ordered   - Initiate measures to prevent increased intracranial pressure  - Monitor for seizure activity and implement precautions if appropriate      Outcome: Progressing  Goal: Achieves maximal functionality and self care  Description: INTERVENTIONS  - Monitor swallowing and airway patency with patient fatigue and changes in neurological status  - Encourage and assist patient to increase activity and self care     - Encourage visually impaired, hearing impaired and aphasic patients to use assistive/communication devices  Outcome: Progressing     Problem: CARDIOVASCULAR - ADULT  Goal: Maintains optimal cardiac output and hemodynamic stability  Description: INTERVENTIONS:  - Monitor I/O, vital signs and rhythm  - Monitor for S/S and trends of decreased cardiac output  - Administer and titrate ordered vasoactive medications to optimize hemodynamic stability  - Assess quality of pulses, skin color and temperature  - Assess for signs of decreased coronary artery perfusion  - Instruct patient to report change in severity of symptoms  Outcome: Progressing  Goal: Absence of cardiac dysrhythmias or at baseline rhythm  Description: INTERVENTIONS:  - Continuous cardiac monitoring, vital signs, obtain 12 lead EKG if ordered  - Administer antiarrhythmic and heart rate control medications as ordered  - Monitor electrolytes and administer replacement therapy as ordered  Outcome: Progressing     Problem: RESPIRATORY - ADULT  Goal: Achieves optimal ventilation and oxygenation  Description: INTERVENTIONS:  - Assess for changes in respiratory status  - Assess for changes in mentation and behavior  - Position to facilitate oxygenation and minimize respiratory effort  - Oxygen administered by appropriate delivery if ordered  - Initiate smoking cessation education as indicated  - Encourage broncho-pulmonary hygiene including cough, deep breathe, Incentive Spirometry  - Assess the need for suctioning and aspirate as needed  - Assess and instruct to report SOB or any respiratory difficulty  - Respiratory Therapy support as indicated  Outcome: Progressing     Problem: GASTROINTESTINAL - ADULT  Goal: Minimal or absence of nausea and/or vomiting  Description: INTERVENTIONS:  - Administer IV fluids if ordered to ensure adequate hydration  - Maintain NPO status until nausea and vomiting are resolved  - Nasogastric tube if ordered  - Administer ordered antiemetic medications as needed  - Provide nonpharmacologic comfort measures as appropriate  - Advance diet as tolerated, if ordered  - Consider nutrition services referral to assist patient with adequate nutrition and appropriate food choices  Outcome: Progressing  Goal: Maintains or returns to baseline bowel function  Description: INTERVENTIONS:  - Assess bowel function  - Encourage oral fluids to ensure adequate hydration  - Administer IV fluids if ordered to ensure adequate hydration  - Administer ordered medications as needed  - Encourage mobilization and activity  - Consider nutritional services referral to assist patient with adequate nutrition and appropriate food choices  Outcome: Progressing  Goal: Maintains adequate nutritional intake  Description: INTERVENTIONS:  - Monitor percentage of each meal consumed  - Identify factors contributing to decreased intake, treat as appropriate  - Assist with meals as needed  - Monitor I&O, weight, and lab values if indicated  - Obtain nutrition services referral as needed  Outcome: Progressing     Problem: GENITOURINARY - ADULT  Goal: Maintains or returns to baseline urinary function  Description: INTERVENTIONS:  - Assess urinary function  - Encourage oral fluids to ensure adequate hydration if ordered  - Administer IV fluids as ordered to ensure adequate hydration  - Administer ordered medications as needed  - Offer frequent toileting  - Follow urinary retention protocol if ordered  Outcome: Progressing     Problem: METABOLIC, FLUID AND ELECTROLYTES - ADULT  Goal: Electrolytes maintained within normal limits  Description: INTERVENTIONS:  - Monitor labs and assess patient for signs and symptoms of electrolyte imbalances  - Administer electrolyte replacement as ordered  - Monitor response to electrolyte replacements, including repeat lab results as appropriate  - Instruct patient on fluid and nutrition as appropriate  Outcome: Progressing  Goal: Fluid balance maintained  Description: INTERVENTIONS:  - Monitor labs   - Monitor I/O and WT  - Instruct patient on fluid and nutrition as appropriate  - Assess for signs & symptoms of volume excess or deficit  Outcome: Progressing     Problem: SKIN/TISSUE INTEGRITY - ADULT  Goal: Skin integrity remains intact  Description: INTERVENTIONS  - Identify patients at risk for skin breakdown  - Assess and monitor skin integrity  - Assess and monitor nutrition and hydration status  - Monitor labs (i e  albumin)  - Assess for incontinence   - Turn and reposition patient  - Assist with mobility/ambulation  - Relieve pressure over bony prominences  - Avoid friction and shearing  - Provide appropriate hygiene as needed including keeping skin clean and dry  - Evaluate need for skin moisturizer/barrier cream  - Collaborate with interdisciplinary team (i e  Nutrition, Rehabilitation, etc )   - Patient/family teaching  Outcome: Progressing     Problem: MUSCULOSKELETAL - ADULT  Goal: Maintain or return mobility to safest level of function  Description: INTERVENTIONS:  - Assess patient's ability to carry out ADLs; assess patient's baseline for ADL function and identify physical deficits which impact ability to perform ADLs (bathing, care of mouth/teeth, toileting, grooming, dressing, etc )  - Assess/evaluate cause of self-care deficits   - Assess range of motion  - Assess patient's mobility  - Assess patient's need for assistive devices and provide as appropriate  - Encourage maximum independence but intervene and supervise when necessary  - Involve family in performance of ADLs  - Assess for home care needs following discharge   - Consider OT consult to assist with ADL evaluation and planning for discharge  - Provide patient education as appropriate  Outcome: Progressing     Problem: PAIN - ADULT  Goal: Verbalizes/displays adequate comfort level or baseline comfort level  Description: Interventions:  - Encourage patient to monitor pain and request assistance  - Assess pain using appropriate pain scale  - Administer analgesics based on type and severity of pain and evaluate response  - Implement non-pharmacological measures as appropriate and evaluate response  - Consider cultural and social influences on pain and pain management  - Notify physician/advanced practitioner if interventions unsuccessful or patient reports new pain  Outcome: Progressing     Problem: INFECTION - ADULT  Goal: Absence or prevention of progression during hospitalization  Description: INTERVENTIONS:  - Assess and monitor for signs and symptoms of infection  - Monitor lab/diagnostic results  - Monitor all insertion sites, i e  indwelling lines, tubes, and drains  - Monitor endotracheal if appropriate and nasal secretions for changes in amount and color  - [place] appropriate cooling/warming therapies per order  - Administer medications as ordered  - Instruct and encourage patient and family to use good hand hygiene technique  - Identify and instruct in appropriate isolation precautions for identified infection/condition  Outcome: Progressing     Problem: SAFETY ADULT  Goal: Patient will remain free of falls  Description: INTERVENTIONS:  - Assess patient frequently for physical needs  -  Identify cognitive and physical deficits and behaviors that affect risk of falls    -  [place] fall precautions as indicated by assessment   - Educate patient/family on patient safety including physical limitations  - Instruct patient to call for assistance with activity based on assessment  - Modify environment to reduce risk of injury  - Consider OT/PT consult to assist with strengthening/mobility  Outcome: Progressing  Goal: Maintain or return to baseline ADL function  Description: INTERVENTIONS:  -  Assess patient's ability to carry out ADLs; assess patient's baseline for ADL function and identify physical deficits which impact ability to perform ADLs (bathing, care of mouth/teeth, toileting, grooming, dressing, etc )  - Assess/evaluate cause of self-care deficits   - Assess range of motion  - Assess patient's mobility; develop plan if impaired  - Assess patient's need for assistive devices and provide as appropriate  - Encourage maximum independence but intervene and supervise when necessary  - Involve family in performance of ADLs  - Assess for home care needs following discharge   - Consider OT consult to assist with ADL evaluation and planning for discharge  - Provide patient education as appropriate  Outcome: Progressing  Goal: Maintain or return mobility status to optimal level  Description: INTERVENTIONS:  - Assess patient's baseline mobility status (ambulation, transfers, stairs, etc )    - Identify cognitive and physical deficits and behaviors that affect mobility  - Identify mobility aids required to assist with transfers and/or ambulation (gait belt, sit-to-stand, lift, walker, cane, etc )  - [place] fall precautions as indicated by assessment  - Record patient progress and toleration of activity level on Mobility SBAR; progress patient to next Phase/Stage  - Instruct patient to call for assistance with activity based on assessment  - Consider rehabilitation consult to assist with strengthening/weightbearing, etc   Outcome: Progressing     Problem: DISCHARGE PLANNING  Goal: Discharge to home or other facility with appropriate resources  Description: INTERVENTIONS:  - Identify barriers to discharge w/patient and caregiver  - Arrange for needed discharge resources and transportation as appropriate  - Identify discharge learning needs (meds, wound care, etc )  - Arrange for interpretive services to assist at discharge as needed  - Refer to Case Management Department for coordinating discharge planning if the patient needs post-hospital services based on physician/advanced practitioner order or complex needs related to functional status, cognitive ability, or social support system  Outcome: Progressing     Problem: Knowledge Deficit  Goal: Patient/family/caregiver demonstrates understanding of disease process, treatment plan, medications, and discharge instructions  Description: Complete learning assessment and assess knowledge base  Interventions:  - Provide teaching at level of understanding  - Provide teaching via preferred learning methods  Outcome: Progressing     Problem: Nutrition/Hydration-ADULT  Goal: Nutrient/Hydration intake appropriate for improving, restoring or maintaining nutritional needs  Description: Monitor and assess patient's nutrition/hydration status for malnutrition   Collaborate with interdisciplinary team and initiate plan and interventions as ordered  Monitor patient's weight and dietary intake as ordered or per policy  Utilize nutrition screening tool and intervene as necessary  Determine patient's food preferences and provide high-protein, high-caloric foods as appropriate       INTERVENTIONS:  - Monitor oral intake, urinary output, labs, and treatment plans  - Assess nutrition and hydration status and recommend course of action  - Evaluate amount of meals eaten  - Assist patient with eating if necessary   - Allow adequate time for meals  - Recommend/ encourage appropriate diets, oral nutritional supplements, and vitamin/mineral supplements  - Order, calculate, and assess calorie counts as needed  - Recommend, monitor, and adjust tube feedings and TPN/PPN based on assessed needs  - Assess need for intravenous fluids  - Provide specific nutrition/hydration education as appropriate  - Include patient/family/caregiver in decisions related to nutrition  Outcome: Progressing

## 2021-05-07 VITALS
SYSTOLIC BLOOD PRESSURE: 150 MMHG | DIASTOLIC BLOOD PRESSURE: 67 MMHG | TEMPERATURE: 98.7 F | HEART RATE: 61 BPM | BODY MASS INDEX: 28.58 KG/M2 | HEIGHT: 64 IN | WEIGHT: 167.4 LBS | RESPIRATION RATE: 18 BRPM | OXYGEN SATURATION: 94 %

## 2021-05-07 LAB
ACANTHOCYTES BLD QL SMEAR: PRESENT
ALBUMIN SERPL BCP-MCNC: 2.7 G/DL (ref 3.5–5)
ALP SERPL-CCNC: 63 U/L (ref 46–116)
ALT SERPL W P-5'-P-CCNC: 8 U/L (ref 12–78)
ANION GAP SERPL CALCULATED.3IONS-SCNC: 9 MMOL/L (ref 4–13)
ANISOCYTOSIS BLD QL SMEAR: PRESENT
AST SERPL W P-5'-P-CCNC: 16 U/L (ref 5–45)
BASOPHILS # BLD MANUAL: 0 THOUSAND/UL (ref 0–0.1)
BASOPHILS NFR MAR MANUAL: 0 % (ref 0–1)
BILIRUB SERPL-MCNC: 0.4 MG/DL (ref 0.2–1)
BUN SERPL-MCNC: 27 MG/DL (ref 5–25)
CALCIUM ALBUM COR SERPL-MCNC: 9 MG/DL (ref 8.3–10.1)
CALCIUM SERPL-MCNC: 8 MG/DL (ref 8.3–10.1)
CHLORIDE SERPL-SCNC: 104 MMOL/L (ref 100–108)
CO2 SERPL-SCNC: 21 MMOL/L (ref 21–32)
CREAT SERPL-MCNC: 1.21 MG/DL (ref 0.6–1.3)
CRP SERPL QL: 109.9 MG/L
DME PARACHUTE DELIVERY DATE ACTUAL: NORMAL
DME PARACHUTE DELIVERY DATE REQUESTED: NORMAL
DME PARACHUTE ITEM DESCRIPTION: NORMAL
DME PARACHUTE ORDER STATUS: NORMAL
DME PARACHUTE SUPPLIER NAME: NORMAL
DME PARACHUTE SUPPLIER PHONE: NORMAL
EOSINOPHIL # BLD MANUAL: 0.08 THOUSAND/UL (ref 0–0.4)
EOSINOPHIL NFR BLD MANUAL: 1 % (ref 0–6)
ERYTHROCYTE [DISTWIDTH] IN BLOOD BY AUTOMATED COUNT: 13.5 % (ref 11.6–15.1)
GFR SERPL CREATININE-BSD FRML MDRD: 42 ML/MIN/1.73SQ M
GLUCOSE SERPL-MCNC: 106 MG/DL (ref 65–140)
HCT VFR BLD AUTO: 33 % (ref 34.8–46.1)
HGB BLD-MCNC: 10.6 G/DL (ref 11.5–15.4)
LYMPHOCYTES # BLD AUTO: 1.87 THOUSAND/UL (ref 0.6–4.47)
LYMPHOCYTES # BLD AUTO: 23 % (ref 14–44)
MCH RBC QN AUTO: 29 PG (ref 26.8–34.3)
MCHC RBC AUTO-ENTMCNC: 32.1 G/DL (ref 31.4–37.4)
MCV RBC AUTO: 90 FL (ref 82–98)
MONOCYTES # BLD AUTO: 0.49 THOUSAND/UL (ref 0–1.22)
MONOCYTES NFR BLD: 6 % (ref 4–12)
NEUTROPHILS # BLD MANUAL: 5.62 THOUSAND/UL (ref 1.85–7.62)
NEUTS SEG NFR BLD AUTO: 69 % (ref 43–75)
NRBC BLD AUTO-RTO: 0 /100 WBCS
OVALOCYTES BLD QL SMEAR: PRESENT
PLATELET # BLD AUTO: 167 THOUSANDS/UL (ref 149–390)
PLATELET BLD QL SMEAR: ADEQUATE
PMV BLD AUTO: 11.4 FL (ref 8.9–12.7)
POIKILOCYTOSIS BLD QL SMEAR: PRESENT
POTASSIUM SERPL-SCNC: 4.3 MMOL/L (ref 3.5–5.3)
PROT SERPL-MCNC: 5.9 G/DL (ref 6.4–8.2)
RBC # BLD AUTO: 3.66 MILLION/UL (ref 3.81–5.12)
RBC MORPH BLD: PRESENT
SODIUM SERPL-SCNC: 134 MMOL/L (ref 136–145)
TOTAL CELLS COUNTED SPEC: 100
VARIANT LYMPHS # BLD AUTO: 1 %
WBC # BLD AUTO: 8.14 THOUSAND/UL (ref 4.31–10.16)

## 2021-05-07 PROCEDURE — 99239 HOSP IP/OBS DSCHRG MGMT >30: CPT | Performed by: FAMILY MEDICINE

## 2021-05-07 PROCEDURE — 94761 N-INVAS EAR/PLS OXIMETRY MLT: CPT

## 2021-05-07 PROCEDURE — 97116 GAIT TRAINING THERAPY: CPT

## 2021-05-07 PROCEDURE — 85027 COMPLETE CBC AUTOMATED: CPT | Performed by: FAMILY MEDICINE

## 2021-05-07 PROCEDURE — 85007 BL SMEAR W/DIFF WBC COUNT: CPT | Performed by: FAMILY MEDICINE

## 2021-05-07 PROCEDURE — 97166 OT EVAL MOD COMPLEX 45 MIN: CPT

## 2021-05-07 PROCEDURE — 80053 COMPREHEN METABOLIC PANEL: CPT | Performed by: FAMILY MEDICINE

## 2021-05-07 PROCEDURE — 99232 SBSQ HOSP IP/OBS MODERATE 35: CPT | Performed by: FAMILY MEDICINE

## 2021-05-07 PROCEDURE — 86140 C-REACTIVE PROTEIN: CPT | Performed by: FAMILY MEDICINE

## 2021-05-07 PROCEDURE — 97163 PT EVAL HIGH COMPLEX 45 MIN: CPT

## 2021-05-07 RX ORDER — MULTIVITAMIN/IRON/FOLIC ACID 18MG-0.4MG
1 TABLET ORAL DAILY
Qty: 30 TABLET | Refills: 0 | Status: SHIPPED | OUTPATIENT
Start: 2021-05-11

## 2021-05-07 RX ORDER — NADOLOL 20 MG/1
20 TABLET ORAL DAILY
Qty: 30 TABLET | Refills: 0 | Status: SHIPPED | OUTPATIENT
Start: 2021-05-08

## 2021-05-07 RX ORDER — LISINOPRIL 20 MG/1
20 TABLET ORAL DAILY
Status: DISCONTINUED | OUTPATIENT
Start: 2021-05-07 | End: 2021-05-07 | Stop reason: HOSPADM

## 2021-05-07 RX ORDER — MELATONIN
2000 DAILY
Qty: 10 TABLET | Refills: 0 | Status: SHIPPED | OUTPATIENT
Start: 2021-05-08 | End: 2021-05-13

## 2021-05-07 RX ORDER — BENZONATATE 100 MG/1
100 CAPSULE ORAL 3 TIMES DAILY PRN
Qty: 20 CAPSULE | Refills: 0 | Status: SHIPPED | OUTPATIENT
Start: 2021-05-07

## 2021-05-07 RX ADMIN — Medication 2000 UNITS: at 09:34

## 2021-05-07 RX ADMIN — REMDESIVIR 100 MG: 100 INJECTION, POWDER, LYOPHILIZED, FOR SOLUTION INTRAVENOUS at 11:41

## 2021-05-07 RX ADMIN — VENLAFAXINE HYDROCHLORIDE 37.5 MG: 37.5 CAPSULE, EXTENDED RELEASE ORAL at 09:34

## 2021-05-07 RX ADMIN — PANTOPRAZOLE SODIUM 40 MG: 40 TABLET, DELAYED RELEASE ORAL at 06:12

## 2021-05-07 RX ADMIN — AMLODIPINE BESYLATE 5 MG: 5 TABLET ORAL at 09:34

## 2021-05-07 RX ADMIN — ZINC SULFATE 220 MG (50 MG) CAPSULE 220 MG: CAPSULE at 09:34

## 2021-05-07 RX ADMIN — DICLOFENAC SODIUM 2 G: 10 GEL TOPICAL at 09:35

## 2021-05-07 RX ADMIN — FLUTICASONE PROPIONATE 1 SPRAY: 50 SPRAY, METERED NASAL at 09:35

## 2021-05-07 RX ADMIN — NADOLOL 20 MG: 20 TABLET ORAL at 09:34

## 2021-05-07 RX ADMIN — ATORVASTATIN CALCIUM 20 MG: 20 TABLET, FILM COATED ORAL at 16:13

## 2021-05-07 RX ADMIN — OXYCODONE HYDROCHLORIDE AND ACETAMINOPHEN 1000 MG: 500 TABLET ORAL at 09:34

## 2021-05-07 RX ADMIN — LISINOPRIL 20 MG: 20 TABLET ORAL at 09:34

## 2021-05-07 RX ADMIN — HEPARIN SODIUM 5000 UNITS: 5000 INJECTION INTRAVENOUS; SUBCUTANEOUS at 06:12

## 2021-05-07 RX ADMIN — DICLOFENAC SODIUM 2 G: 10 GEL TOPICAL at 11:41

## 2021-05-07 NOTE — CASE MANAGEMENT
[facility] has been recommended for pt at time of discharge  Pt does not have a preference to any [facility] agency  Pt sts she will be staying with her daughter at time of discharge, address is:  [address]    Referrals have been placed to [facility] , [facility] and [facility]  Awaiting reply   None of the above [facility] are able to accept pt at time of discharge  Additional referrals for [facility] have been placed to [facility] [address]    A walker is recommended for pt at time of discharge  Pt does not have a preference to a [employer] company  [place] of choice was provided in regards to needing a home [address], pt does not have preference  Pt is aware that we frequently utilized [employer] as we have a working relationship with this company  Patients choice is to use [employer]  An order for a walker has been uploaded in [facility] has been delivered to pts bedside

## 2021-05-07 NOTE — ASSESSMENT & PLAN NOTE
· On CKD stage 3 Creatinine 1 82  Baseline around 1 4 -1 5 from [facility] records   Suspect multifactorial due to use of Ace inhibitor and decreased oral intake with acute diarrhea resolved no diarrhea  · Resolved

## 2021-05-07 NOTE — CASE MANAGEMENT
CM received call from pts OP CM , Gage Childs, who sts she follows pt on an OP basis, requesting dc instructions be faxed to her    As per request, info faxed to [phone]  Tania Bailey phone [phone]

## 2021-05-07 NOTE — ASSESSMENT & PLAN NOTE
· BP reviewed  · Continue Corgard decreased to 20 mg she is having some sinus bradycardia in the 50  · Hold Maxzide for now but restart her Vasotec 10 mg daily continue Norvasc 5 mg daily till at least she goes home

## 2021-05-07 NOTE — PHYSICAL THERAPY NOTE
PHYSICAL THERAPY EVALUATION  NAME:  Malgorzata Mims  DATE: 05/07/21    AGE:   78 y o  Mrn:   [id number]  ADMIT DX:  Cough [R05]  Pneumonitis [J18 9]  Dyspnea [R06 00]  SOB (shortness of breath) [R06 02]  Hypoxia [R09 02]  COVID-19 [U07 1]    Past Medical History:   Diagnosis Date    Arthritis     GERD (gastroesophageal reflux disease)     Hypertension      Length Of Stay: 4  Performed at least 2 patient identifiers during session: Name and Birthday  PHYSICAL THERAPY EVALUATION :      05/07/21 1053   PT Last Visit   PT Visit Date 05/07/21   Note Type   Note type Evaluation   Pain Assessment   Pain Assessment Tool 0-10   Pain Score No Pain   Home Living   Type of Home House  (1 ROSARIO)   Home Layout One level;Performs ADLs on one level; Able to live on main level with bedroom/bathroom  (rail with steps to basement (doesn't need to use))   Bathroom Shower/Tub Tub/shower unit   [address] [place] [address] Dr pagan  (doesn't use shower chair)   [address]  (used at times at her house, but is staying w her dtr)   Additional Comments Reports staying with her dtr at this time in a [facility]  otherwise lives with her son in a 1Sh with 3 ROSARIO w B HRs  Prior Function   Level of [place] Independent with ADLs and functional mobility   Lives With Daughter; Other (Comment)  (grandchildren)   Receives Help From Family   ADL Assistance Independent   IADLs Needs assistance  (assistance for cleaning  pt helps with cooking)   Falls in the last 6 months 0   Comments Reports being independent with mobility without AD  Has a cane at her sons home which she uses prn  However, doesn't use at her dtrs  Restrictions/Precautions   Other Precautions Airborne/isolation;Contact/isolation; Chair Alarm; Bed Alarm; Fall Risk   Cognition   Orientation Level Oriented X4   Following Commands Follows one step commands without difficulty   RLE Assessment   RLE Assessment WFL  (grossly 3+/5)   LLE Assessment   LLE Assessment WFL  (grossly 3+/5)   Coordination   Movements are Fluid and Coordinated 1   Bed Mobility   Supine to Sit 6  Modified independent   Additional items Increased time required   Additional Comments HOB flat without bedrails  increased time to complete   Transfers   Sit to Stand   (stand by assistance)   Additional items Assist x 1; Increased time required;Verbal cues   Stand to Sit   (stand by assistance)   Additional items Assist x 1; Increased time required;Verbal cues   Stand pivot   (steadying assistance)   Additional items Assist x 1; Increased time required;Verbal cues   Additional Comments no AD for transfers  sit<>stand without AD with stand by assistance for balance  spt without AD with steadying assistance  short shuffled steps  Ambulation/Elevation   Gait pattern Wide ENIO; Excessively slow; Short stride  (reaching for external support at times)   Gait Assistance   (steadying to minAx1 w LOB to right req minAx1 to recover)   Additional items Assist x 1;Verbal cues   Assistive Device None   Distance 18'x1 and 8'x1 without AD with steadying assistance  then ambulated 80'x1 without AD with slow marzena, wide ENIO, reaching for external support, increased path deviation  head turns right and left  with head turns pt with LOB to right requiring minAx1 and stepping strategy to recover  Balance   Static Sitting Normal   Dynamic Sitting Good   Static Standing Fair   Dynamic Standing Fair -   Ambulatory Poor +   Endurance Deficit   Endurance Deficit Yes   Endurance Deficit Description min P[place] with ambulation requiring cues of rpursed lip breathing  SpO2 stable >/= 90% throughout  Activity Tolerance   Activity Tolerance Patient limited by fatigue   Medical Staff Made Aware OT, [address]   Nurse Made Aware RN, Tiff   Assessment   Prognosis Good   Problem List Decreased strength;Decreased endurance; Impaired balance;Decreased mobility; Impaired judgement;Decreased safety awareness   Barriers to Discharge Inaccessible home environment   Barriers to Discharge Comments pt requires assistance wiht all mobility   Goals   Patient Goals "Go home"   STG Expiration Date 05/17/21   PT Treatment Day 1   Plan   Treatment/Interventions Functional transfer training;LE strengthening/ROM; Elevations; Therapeutic exercise; Endurance training;Patient/family training;Equipment eval/education; Bed mobility;Gait training; Compensatory technique education;Spoke to case management;Spoke to nursing;OT   PT Frequency Other (Comment)  (3-5x/week)   Recommendation   PT Discharge Recommendation Home with home health rehabilitation   Equipment Recommended [place] walker   Change/add to [place]? No   PT - OK to Discharge   (when medically cleared to home with inc family support)   Additional Comments pt sitting in recliner chair at end of session with all needs wihtin reach and posey alarm on   Additional Comments 2 pt will require at least supervision with all mobility with use of RW  AM-PAC Basic Mobility Inpatient   Turning in Bed Without Bedrails 4   Lying on Back to Sitting on Edge of Flat Bed 4   Moving Bed to Chair 3   Standing Up From Chair 3   Walk in Room 3   Climb 3-5 Stairs 3   Basic Mobility Inpatient Raw Score 20   Basic Mobility Standardized Score 43 99     (Please find full objective findings from PT assessment regarding body systems outlined above)  Assessment: Pt is a 78 y o  female seen for PT evaluation s/p admission to [address] [place] [address] on 5/3/2021 with Pneumonia due to COVID-19 virus  Order placed for PT services    Upon evaluation: Pt is presenting with impaired functional mobility due to decreased strength, decreased endurance, impaired balance, gait deviations, decreased safety awareness, impaired judgment and fall risk requiring stand by to steadying assistance for transfers and steadying to minimal assistance for ambulation with out AD with LOB to the right when turning head left  Pt's clinical presentation is currently unpredictable given the functional mobility deficits above, especially weakness, decreased endurance, gait deviations, decreased activity tolerance, decreased functional mobility tolerance, decreased safety awareness, impaired judgement and SOB upon exertion, coupled with fall risks as indicated by AM-PAC 6-Clicks: 09/29 as well as impaired balance, polypharmacy, impaired judgement and decreased safety awareness and combined with medical complications of abnormal renal lab values, abnormal H&H, abnormal sodium values, need for input for mobility technique/safety and abnormal CRP, BNP and D dimer  Pt's PMHx and comorbidities that may affect physical performance and progress include: HTN and arthritis  Personal factors affecting pt at time of IE include: anxiety, step(s) to enter environment, inability to perform ADLs, inability to navigate level surfaces without external assistance, inability to ambulate household distances and limited insight into impairments  Pt will benefit from continued skilled PT services to address deficits as defined above and to maximize level of functional mobility to facilitate return toward PLOF and improved QOL  From PT/mobility standpoint, recommendation at time of d/c would be Home PT, home with family support and with walker pending progress in order to reduce fall risk and maximize pt's functional independence and consistency with mobility in order to facilitate return to PLOF  Recommend trial with walker next 1-2 sessions, trial with cane next 1-2 sessions and ther ex next 1-2 sessions  The patient's AM-PAC Basic Mobility Inpatient Short Form Raw Score is 20, Standardized Score is 43 99  A standardized score greater than 42 9 suggests the patient may benefit from discharge to home  Please also refer to the recommendation of the Physical Therapist for safe discharge planning      Goals: Pt will: Pt will perform transfers with modified I to increase Indep in home environment, decrease burden of care, decrease risk for falls, improve activity tolerance and improve gait quality and prepare for ambulation  Pt will ambulate with LRAD for >/= 250' with  modified I  to increase Indep in home environment, decrease risk for falls, improve activity tolerance and improve gait quality and to access home environment  Pt will complete >/= 4 steps with with unilateral handrail with Supervision to decrease risk for falls and improve activity tolerance  Pt will participate in objective balance assessment to determine baseline fall risk  Pt will increase B LE strength >/= 1/2 MMT grade to facilitate functional mobility  Joy Carvalho, PT,DPT     PHYSICAL THERAPY TREATMENT NOTE  Time In:1110  Time [id number]  Total Time: 15'      S:  "The walker would be a nuisance "  O:  Initiated use of spc  Sit<>stand with spc with stand by assistance  Ambulated 80'x1 with spc with wide ENIO with steadying to stand by assistance and minAx1 2x with LOB to right with left head turns, stepping strategy and minAx1 to recover, slow marzena with decreased step length and min cues for proper sequencing with spc  Cues for increased step length  Initiated use of RW  Sit<>stand with RW with supervision with min cues for hand placement for safety with RW    Ambulated 90'x1 with RW with close supervision with slow marzena with decreased step length  Min cues to stay within ENIO of RW and inc step length  With head turns to left and right patient with no LOB, but slight path deviation with head turn to left  Discussed recommendation for use of RW at this time upon return to home  Pt verbalized understanding  Further verbalized understanding of HHPT services and assistance from family  A:  Pt requires increased time to complete mobility  She requires increased sitting rest breaks between all activities with cues for pursed lip breathing to recover [place]   She was able to ambulate with slight decreased assistance with spc compared to no AD, however still demonstrated decreased balance with LOB to right with head turn to left  She ambulated with improved gait pattern and balance without LOB and decreased assistance with use of RW  She demonstrated improved transfers with use of RW  She is limited by decreased strength, balance, endurance  She will continue to benefit from PT services to maximize LOF  P:  Recommend LE strengthening, dynamic balance, endurance training, transfer and gait training with LRAD, stairs when appropriate      David Mercer, PT, DPT

## 2021-05-07 NOTE — PLAN OF CARE
Problem: PHYSICAL THERAPY ADULT  Goal: Performs mobility at highest level of function for planned discharge setting  See evaluation for individualized goals  Description: Treatment/Interventions: Functional transfer training, LE strengthening/ROM, Elevations, Therapeutic exercise, Endurance training, Patient/family training, Equipment eval/education, Bed mobility, Gait training, Compensatory technique education, Spoke to case management, Spoke to nursing, OT  Equipment Recommended: [employer]       See flowsheet documentation for full assessment, interventions and recommendations  9/9/3286 7527 by Joy Carvalho PT  Note: Prognosis: Good  Problem List: Decreased strength, Decreased endurance, Impaired balance, Decreased mobility, Impaired judgement, Decreased safety awareness  Pt requires increased time to complete mobility  She requires increased sitting rest breaks between all activities with cues for pursed lip breathing to recover [place]  She was able to ambulate with slight decreased assistance with spc compared to no AD, however still demonstrated decreased balance with LOB to right with head turn to left  She ambulated with improved gait pattern and balance without LOB and decreased assistance with use of RW  She demonstrated improved transfers with use of RW  She is limited by decreased strength, balance, endurance  She will continue to benefit from PT services to maximize LOF  Barriers to Discharge: Inaccessible home environment  Barriers to Discharge Comments: pt requires assistance wiht all mobility     PT Discharge Recommendation: Home with home health rehabilitation     PT - OK to Discharge: (when medically cleared to home with inc family support)    See flowsheet documentation for full assessment

## 2021-05-07 NOTE — PROGRESS NOTES
[address]  Progress Note - Malgorzata Mims 1940, 78 y o  female MRN: [id number]  Unit/Bed#: -[facility] Encounter: [id number]  Primary Care Provider: Toño Bernal MD   Date and time admitted to hospital: 5/3/2021  6:51 PM    * Pneumonia due to COVID-19 virus  Assessment & Plan  · Presents with congestion, myalgia, nausea, and fatigue  · All household members have COVID  · COVID testing:  Positive symptoms since Thursday 4/29 as discussed with the daughter suspect could be more that is wise discussed with the daughter will proceed with quarantine after she tested positive from that date which is 5 /3  · Not requiring supplemental oxygen:  Saturation is 95% on room air  · CTA chest PE study: Extensive multifocal groundglass opacities correlating to the history of Covid-19 pneumonia/pneumonitis  No PE  · BNP 20 80, , trop <0 02  · CRP 43 D-dimer 1 12 ferritin is normal- CRP has been trending up but patient has no more fevers now and clinically she is improved from respiratory standpoint in no diarrhea just to some nausea today from breakfast  · Oxygen protocol  · Two procalcitonin is are negative discontinue antibiotics  · Remdesivir 5/5  · Vitamin-C vitamin-D zinc  · Respiratory protocol  · Heparin  · Patient her home O2 studies done today does not require any O2 no fever since 24 hours ago  No shortness of breath no major cough  No recurrence of nausea yesterday after breakfast she did well with dinner but today because she does not like the breakfast she actually had some episode of vomiting  Will give her last dose of remdesivir now recheck her after lunch if she has no nausea and PT states she is safe to go will discharge home  · Placed on Tessalon Perles p r n  Acute infectious diarrhea  Assessment & Plan  · Secondary COVID-19  Resolved post imodium    Hyponatremia  Assessment & Plan  · Sodium 134 secondary to acute diarrhea with adequate intake  Anxiety  Assessment & Plan  · Currently controlled  · Continue Effexor  · Continue Ativan as needed    SUSANA (acute kidney injury) ([facility] [address] )  Assessment & Plan  · On CKD stage 3 Creatinine 1 82  Baseline around 1 4 -1 5 from [facility] records  Suspect multifactorial due to use of Ace inhibitor and decreased oral intake with acute diarrhea resolved no diarrhea  · Resolved    Essential hypertension  Assessment & Plan  · BP reviewed  · Continue Corgard decreased to 20 mg she is having some sinus bradycardia in the 50  · Hold Maxzide for now but restart her Vasotec 10 mg daily continue Norvasc 5 mg daily till at least she goes home    Abnormal finding on CT scan  Assessment & Plan  · CTA chest: Scattered splenic, hepatic and bilateral hilar calcified granulomata  · Outpatient follow-up if indicated           VTE Pharmacologic Prophylaxis:   Pharmacologic: Heparin  Mechanical VTE Prophylaxis in Place: Yes    Patient Centered Rounds: I have performed bedside rounds with nursing staff today  Discussions with Specialists or Other Care Team Provider:  Discussed with case management and respiratory tract    Education and Discussions with Family / Patient:  Patient will update daughter after their re-evaluation in the afternoon    Time Spent for Care: 30 minutes  More than 50% of total time spent on counseling and coordination of care as described above      Current Length of Stay: 4 day(s)    Current Patient Status: Inpatient   Certification Statement: The patient will continue to require additional inpatient hospital stay due to Await physical therapy evaluation also re-evaluation on her nausea after lunch    Discharge Plan:  Possible home today pending few things above    Code Status: Level 1 - Full Code      Subjective:   Patient seen and examined no chest pain or shortness of breath no cough she did well with the dinner yesterday she does like there breakfast today so when she ate it she has slight vomiting no abdominal pain no diarrhea    Objective:     Vitals:   Temp (24hrs), Av 5 °F (36 9 °C), Min:97 8 °F (36 6 °C), Max:99 2 °F (37 3 °C)    Temp:  [97 8 °F (36 6 °C)-99 2 °F (37 3 °C)] 99 2 °F (37 3 °C)  HR:  [50-66] 66  Resp:  [18-20] 18  BP: (154-166)/(47-79) 166/73  SpO2:  [94 %-95 %] 95 %  Body mass index is 29 19 kg/m²  Input and Output Summary (last 24 hours): Intake/Output Summary (Last 24 hours) at 2021 1037  Last data filed at 2021 7513  Gross per 24 hour   Intake 480 ml   Output 200 ml   Net 280 ml       Physical Exam:     Physical Exam  Vitals signs and nursing note reviewed  Constitutional:       General: She is not in acute distress  Appearance: She is well-developed  HENT:      Head: Normocephalic and atraumatic  Eyes:      Conjunctiva/sclera: Conjunctivae normal    Neck:      Musculoskeletal: Neck supple  Cardiovascular:      Rate and Rhythm: Normal rate and regular rhythm  Heart sounds: No murmur  Pulmonary:      Effort: Pulmonary effort is normal  No respiratory distress  Comments: Decreased at bases  Abdominal:      General: There is no distension  Palpations: Abdomen is soft  Tenderness: There is no abdominal tenderness  Musculoskeletal:         General: No swelling  Skin:     General: Skin is warm and dry  Neurological:      General: No focal deficit present  Mental Status: She is alert and oriented to person, place, and time     Psychiatric:         Mood and Affect: Mood normal            Additional Data:     Labs:    Results from last 7 days   Lab Units 21  0517 21  0526   WBC Thousand/uL 8 14 6 90   HEMOGLOBIN g/dL 10 6* 10 8*   HEMATOCRIT % 33 0* 34 2*   PLATELETS Thousands/uL 167 157   NEUTROS PCT %  --  46   LYMPHS PCT %  --  41   LYMPHO PCT % 23  --    MONOS PCT %  --  10   MONO PCT % 6  --    EOS PCT % 1 2     Results from last 7 days   Lab Units 21  0517   SODIUM mmol/L 134*   POTASSIUM mmol/L 4 3   CHLORIDE mmol/L 104   CO2 mmol/L 21   BUN mg/dL 27*   CREATININE mg/dL 1 21   ANION GAP mmol/L 9   CALCIUM mg/dL 8 0*   ALBUMIN g/dL 2 7*   TOTAL BILIRUBIN mg/dL 0 40   ALK PHOS U/L 63   ALT U/L 8*   AST U/L 16   GLUCOSE RANDOM mg/dL 106                 Results from last 7 days   Lab Units 05/05/21  0520 05/03/21  2205 05/03/21  1926   LACTIC ACID mmol/L  --   --  1 5   PROCALCITONIN ng/ml <0 05 0 06  --            * I Have Reviewed All Lab Data Listed Above  * Additional Pertinent Lab Tests Reviewed: All Labs Within Last 24 Hours Reviewed    Imaging:    Imaging Reports Reviewed Today Include:  None today  Imaging Personally Reviewed by Myself Includes:      Recent Cultures (last 7 days):     Results from last 7 days   Lab Units 05/04/21  0021   BLOOD CULTURE  No Growth at 48 hrs  No Growth at 48 hrs         Last 24 Hours Medication List:   Current Facility-Administered Medications   Medication Dose Route Frequency Provider Last Rate    acetaminophen  650 mg Oral Q6H PRN MARTIN Saunders      amLODIPine  5 mg Oral Daily Margoth Benson MD      ascorbic acid  1,000 mg Oral Q12H [address] MARTIN Saunders      atorvastatin  20 mg Oral Daily MARTIN Saunders      benzonatate  100 mg Oral TID PRN Margoth Benson MD      cholecalciferol  2,000 Units Oral Daily MARTIN Saunders      Diclofenac Sodium  2 g Topical 4x Daily Margoth Benson MD      fluticasone  1 spray Nasal Daily MARTIN Saunders      heparin (porcine)  5,000 Units Subcutaneous [facility] MARTIN Saunders      lisinopril  20 mg Oral Daily Margoth Benson MD      LORazepam  0 5 mg Oral HS MARTIN Saunders      zinc sulfate  220 mg Oral Daily MARTIN Saunders      Followed by   Lisseth Montiel ON 5/11/2021] multivitamin-minerals  1 tablet Oral Daily MARTIN Saunders      nadolol  20 mg Oral Daily Margoth Benson MD      pantoprazole  40 mg Oral Early Morning MARTIN Saunders      remdesivir  100 mg Intravenous Once Brian Pratt MD      venlafaxine  37 5 mg Oral Daily MARTIN Pacheco          Today, Patient Was Seen By: Brian Pratt MD    ** Please Note: Dictation voice to text software may have been used in the creation of this document   **

## 2021-05-07 NOTE — PLAN OF CARE
Problem: OCCUPATIONAL THERAPY ADULT  Goal: Performs self-care activities at highest level of function for planned discharge setting  See evaluation for individualized goals  Description: Treatment Interventions: ADL retraining, Functional transfer training, Endurance training, Neuromuscular reeducation, Energy conservation  Equipment Recommended: Other (comment)(RW)    See flowsheet documentation for full assessment, interventions and recommendations  Note: Limitation: Decreased ADL status, Decreased UE strength, Decreased Safe judgement during ADL, Decreased endurance, Decreased self-care trans, Decreased high-level ADLs  Prognosis: Good  Assessment: Pt is a 78 y o  female seen for OT evaluation s/p admit to [address] [place] [address] on 5/3/2021 w/ Pneumonia due to COVID-19 virus  Comorbidities affecting pt's functional performance at time of assessment include: Pneumonia due to COVID-19, HTN, SUSANA, Anxiety, Hyponatremia  Personal factors affecting pt at time of IE include:steps to enter environment, difficulty performing ADLS, difficulty performing IADLS  and limited insight into deficits  Prior to admission, pt was Mod I with ADLs  Upon evaluation: the following deficits impact occupational performance: decreased strength, decreased balance, decreased tolerance, impaired problem solving and decreased safety awareness  Pt to benefit from continued skilled OT tx while in the hospital to address deficits as defined above and maximize level of functional independence w ADL's and functional mobility  Occupational Performance areas to address include: bathing/shower, toilet hygiene, dressing, functional mobility and clothing management  From OT standpoint, recommendation at time of d/c would be Home OT       OT Discharge Recommendation: Home with home health rehabilitation  OT - OK to Discharge: Yes(when medically stable)    Román Leblanc MS, OTR/L

## 2021-05-07 NOTE — RESPIRATORY THERAPY NOTE
Home Oxygen Qualifying Test       Patient name: Rowena Raymond        : 1940   Date of Test:  May 7, 2021  Diagnosis:      Home Oxygen Test:    **Medicare Guidelines require item(s) 1-5 on all ambulatory patients or 1 and 2 on non-ambulatory patients  1   Baseline SPO2 on Room Air at rest 95 %  2   SPO2 during exercise on Room Air 93 %  During exercise monitor SpO2  If SPO2 increases >=89% with ambulation do not add supplemental             oxygen  If <= 88% on room air add O2 via NC and titrate patient  Patient must be ambulated with O2 and titrated to > 88% with exertion  3   SPO2 on Oxygen at rest N/A % N/A lpm     4   SPO2 during exercise on Oxygen  N/A% a liter flow of N/A lpm     5   Exercise performed:          walking          []  Supplemental Home Oxygen is indicated  [x]  Client does not qualify for home oxygen  Respiratory Additional Notes- Pt SpO2 never below 93% during ambulation    Elizabeth Bose, RT

## 2021-05-07 NOTE — NURSING NOTE
IV site d/c'd  AVS reviewed with pt's daughter via telephone  Medications to be picked up at pharmacy  Follow up with PCP  Verbalized understanding  No further questions or concerns

## 2021-05-07 NOTE — ASSESSMENT & PLAN NOTE
· On CKD stage 3 Creatinine 1 82  Baseline around 1 4 -1 5 from [facility] records   Suspect multifactorial due to use of Ace inhibitor and decreased oral intake with acute diarrhea resolved no diarrhea  · Resolved

## 2021-05-07 NOTE — ASSESSMENT & PLAN NOTE
· BP reviewed  · Continue Corgard decreased to 20 mg she is having some sinus bradycardia in the 50  · Can restart her Maxzide and continue her vasotec

## 2021-05-07 NOTE — PLAN OF CARE
Problem: Potential for Falls  Goal: Patient will remain free of falls  Description: INTERVENTIONS:  - Assess patient frequently for physical needs  -  Identify cognitive and physical deficits and behaviors that affect risk of falls  -  [place] fall precautions as indicated by assessment   - Educate patient/family on patient safety including physical limitations  - Instruct patient to call for assistance with activity based on assessment  - Modify environment to reduce risk of injury  - Consider OT/PT consult to assist with strengthening/mobility  Outcome: Progressing     Problem: NEUROSENSORY - ADULT  Goal: Achieves stable or improved neurological status  Description: INTERVENTIONS  - Monitor and report changes in neurological status  - Monitor vital signs such as temperature, blood pressure, glucose, and any other labs ordered   - Initiate measures to prevent increased intracranial pressure  - Monitor for seizure activity and implement precautions if appropriate      Outcome: Progressing  Goal: Achieves maximal functionality and self care  Description: INTERVENTIONS  - Monitor swallowing and airway patency with patient fatigue and changes in neurological status  - Encourage and assist patient to increase activity and self care     - Encourage visually impaired, hearing impaired and aphasic patients to use assistive/communication devices  Outcome: Progressing     Problem: CARDIOVASCULAR - ADULT  Goal: Maintains optimal cardiac output and hemodynamic stability  Description: INTERVENTIONS:  - Monitor I/O, vital signs and rhythm  - Monitor for S/S and trends of decreased cardiac output  - Administer and titrate ordered vasoactive medications to optimize hemodynamic stability  - Assess quality of pulses, skin color and temperature  - Assess for signs of decreased coronary artery perfusion  - Instruct patient to report change in severity of symptoms  Outcome: Progressing  Goal: Absence of cardiac dysrhythmias or at baseline rhythm  Description: INTERVENTIONS:  - Continuous cardiac monitoring, vital signs, obtain 12 lead EKG if ordered  - Administer antiarrhythmic and heart rate control medications as ordered  - Monitor electrolytes and administer replacement therapy as ordered  Outcome: Progressing     Problem: RESPIRATORY - ADULT  Goal: Achieves optimal ventilation and oxygenation  Description: INTERVENTIONS:  - Assess for changes in respiratory status  - Assess for changes in mentation and behavior  - Position to facilitate oxygenation and minimize respiratory effort  - Oxygen administered by appropriate delivery if ordered  - Initiate smoking cessation education as indicated  - Encourage broncho-pulmonary hygiene including cough, deep breathe, Incentive Spirometry  - Assess the need for suctioning and aspirate as needed  - Assess and instruct to report SOB or any respiratory difficulty  - Respiratory Therapy support as indicated  Outcome: Progressing     Problem: GASTROINTESTINAL - ADULT  Goal: Minimal or absence of nausea and/or vomiting  Description: INTERVENTIONS:  - Administer IV fluids if ordered to ensure adequate hydration  - Maintain NPO status until nausea and vomiting are resolved  - Nasogastric tube if ordered  - Administer ordered antiemetic medications as needed  - Provide nonpharmacologic comfort measures as appropriate  - Advance diet as tolerated, if ordered  - Consider nutrition services referral to assist patient with adequate nutrition and appropriate food choices  Outcome: Progressing  Goal: Maintains or returns to baseline bowel function  Description: INTERVENTIONS:  - Assess bowel function  - Encourage oral fluids to ensure adequate hydration  - Administer IV fluids if ordered to ensure adequate hydration  - Administer ordered medications as needed  - Encourage mobilization and activity  - Consider nutritional services referral to assist patient with adequate nutrition and appropriate food choices  Outcome: Progressing  Goal: Maintains adequate nutritional intake  Description: INTERVENTIONS:  - Monitor percentage of each meal consumed  - Identify factors contributing to decreased intake, treat as appropriate  - Assist with meals as needed  - Monitor I&O, weight, and lab values if indicated  - Obtain nutrition services referral as needed  Outcome: Progressing     Problem: GENITOURINARY - ADULT  Goal: Maintains or returns to baseline urinary function  Description: INTERVENTIONS:  - Assess urinary function  - Encourage oral fluids to ensure adequate hydration if ordered  - Administer IV fluids as ordered to ensure adequate hydration  - Administer ordered medications as needed  - Offer frequent toileting  - Follow urinary retention protocol if ordered  Outcome: Progressing     Problem: METABOLIC, FLUID AND ELECTROLYTES - ADULT  Goal: Electrolytes maintained within normal limits  Description: INTERVENTIONS:  - Monitor labs and assess patient for signs and symptoms of electrolyte imbalances  - Administer electrolyte replacement as ordered  - Monitor response to electrolyte replacements, including repeat lab results as appropriate  - Instruct patient on fluid and nutrition as appropriate  Outcome: Progressing  Goal: Fluid balance maintained  Description: INTERVENTIONS:  - Monitor labs   - Monitor I/O and WT  - Instruct patient on fluid and nutrition as appropriate  - Assess for signs & symptoms of volume excess or deficit  Outcome: Progressing     Problem: SKIN/TISSUE INTEGRITY - ADULT  Goal: Skin integrity remains intact  Description: INTERVENTIONS  - Identify patients at risk for skin breakdown  - Assess and monitor skin integrity  - Assess and monitor nutrition and hydration status  - Monitor labs (i e  albumin)  - Assess for incontinence   - Turn and reposition patient  - Assist with mobility/ambulation  - Relieve pressure over bony prominences  - Avoid friction and shearing  - Provide appropriate hygiene as needed including keeping skin clean and dry  - Evaluate need for skin moisturizer/barrier cream  - Collaborate with interdisciplinary team (i e  Nutrition, Rehabilitation, etc )   - Patient/family teaching  Outcome: Progressing     Problem: MUSCULOSKELETAL - ADULT  Goal: Maintain or return mobility to safest level of function  Description: INTERVENTIONS:  - Assess patient's ability to carry out ADLs; assess patient's baseline for ADL function and identify physical deficits which impact ability to perform ADLs (bathing, care of mouth/teeth, toileting, grooming, dressing, etc )  - Assess/evaluate cause of self-care deficits   - Assess range of motion  - Assess patient's mobility  - Assess patient's need for assistive devices and provide as appropriate  - Encourage maximum independence but intervene and supervise when necessary  - Involve family in performance of ADLs  - Assess for home care needs following discharge   - Consider OT consult to assist with ADL evaluation and planning for discharge  - Provide patient education as appropriate  Outcome: Progressing     Problem: PAIN - ADULT  Goal: Verbalizes/displays adequate comfort level or baseline comfort level  Description: Interventions:  - Encourage patient to monitor pain and request assistance  - Assess pain using appropriate pain scale  - Administer analgesics based on type and severity of pain and evaluate response  - Implement non-pharmacological measures as appropriate and evaluate response  - Consider cultural and social influences on pain and pain management  - Notify physician/advanced practitioner if interventions unsuccessful or patient reports new pain  Outcome: Progressing     Problem: INFECTION - ADULT  Goal: Absence or prevention of progression during hospitalization  Description: INTERVENTIONS:  - Assess and monitor for signs and symptoms of infection  - Monitor lab/diagnostic results  - Monitor all insertion sites, i e  indwelling lines, tubes, and drains  - Monitor endotracheal if appropriate and nasal secretions for changes in amount and color  - [place] appropriate cooling/warming therapies per order  - Administer medications as ordered  - Instruct and encourage patient and family to use good hand hygiene technique  - Identify and instruct in appropriate isolation precautions for identified infection/condition  Outcome: Progressing     Problem: SAFETY ADULT  Goal: Patient will remain free of falls  Description: INTERVENTIONS:  - Assess patient frequently for physical needs  -  Identify cognitive and physical deficits and behaviors that affect risk of falls    -  [place] fall precautions as indicated by assessment   - Educate patient/family on patient safety including physical limitations  - Instruct patient to call for assistance with activity based on assessment  - Modify environment to reduce risk of injury  - Consider OT/PT consult to assist with strengthening/mobility  Outcome: Progressing  Goal: Maintain or return to baseline ADL function  Description: INTERVENTIONS:  -  Assess patient's ability to carry out ADLs; assess patient's baseline for ADL function and identify physical deficits which impact ability to perform ADLs (bathing, care of mouth/teeth, toileting, grooming, dressing, etc )  - Assess/evaluate cause of self-care deficits   - Assess range of motion  - Assess patient's mobility; develop plan if impaired  - Assess patient's need for assistive devices and provide as appropriate  - Encourage maximum independence but intervene and supervise when necessary  - Involve family in performance of ADLs  - Assess for home care needs following discharge   - Consider OT consult to assist with ADL evaluation and planning for discharge  - Provide patient education as appropriate  Outcome: Progressing  Goal: Maintain or return mobility status to optimal level  Description: INTERVENTIONS:  - Assess patient's baseline mobility status (ambulation, transfers, stairs, etc )    - Identify cognitive and physical deficits and behaviors that affect mobility  - Identify mobility aids required to assist with transfers and/or ambulation (gait belt, sit-to-stand, lift, walker, cane, etc )  - [place] fall precautions as indicated by assessment  - Record patient progress and toleration of activity level on Mobility SBAR; progress patient to next Phase/Stage  - Instruct patient to call for assistance with activity based on assessment  - Consider rehabilitation consult to assist with strengthening/weightbearing, etc   Outcome: Progressing     Problem: DISCHARGE PLANNING  Goal: Discharge to home or other facility with appropriate resources  Description: INTERVENTIONS:  - Identify barriers to discharge w/patient and caregiver  - Arrange for needed discharge resources and transportation as appropriate  - Identify discharge learning needs (meds, wound care, etc )  - Arrange for interpretive services to assist at discharge as needed  - Refer to Case Management Department for coordinating discharge planning if the patient needs post-hospital services based on physician/advanced practitioner order or complex needs related to functional status, cognitive ability, or social support system  Outcome: Progressing     Problem: Knowledge Deficit  Goal: Patient/family/caregiver demonstrates understanding of disease process, treatment plan, medications, and discharge instructions  Description: Complete learning assessment and assess knowledge base  Interventions:  - Provide teaching at level of understanding  - Provide teaching via preferred learning methods  Outcome: Progressing     Problem: Nutrition/Hydration-ADULT  Goal: Nutrient/Hydration intake appropriate for improving, restoring or maintaining nutritional needs  Description: Monitor and assess patient's nutrition/hydration status for malnutrition   Collaborate with interdisciplinary team and initiate plan and interventions as ordered  Monitor patient's weight and dietary intake as ordered or per policy  Utilize nutrition screening tool and intervene as necessary  Determine patient's food preferences and provide high-protein, high-caloric foods as appropriate       INTERVENTIONS:  - Monitor oral intake, urinary output, labs, and treatment plans  - Assess nutrition and hydration status and recommend course of action  - Evaluate amount of meals eaten  - Assist patient with eating if necessary   - Allow adequate time for meals  - Recommend/ encourage appropriate diets, oral nutritional supplements, and vitamin/mineral supplements  - Order, calculate, and assess calorie counts as needed  - Recommend, monitor, and adjust tube feedings and TPN/PPN based on assessed needs  - Assess need for intravenous fluids  - Provide specific nutrition/hydration education as appropriate  - Include patient/family/caregiver in decisions related to nutrition  Outcome: Progressing

## 2021-05-07 NOTE — PLAN OF CARE
Problem: PHYSICAL THERAPY ADULT  Goal: Performs mobility at highest level of function for planned discharge setting  See evaluation for individualized goals  Description: Treatment/Interventions: Functional transfer training, LE strengthening/ROM, Elevations, Therapeutic exercise, Endurance training, Patient/family training, Equipment eval/education, Bed mobility, Gait training, Compensatory technique education, Spoke to case management, Spoke to nursing, OT  Equipment Recommended: Amauri Lan       See flowsheet documentation for full assessment, interventions and recommendations  Note: Prognosis: Good  Problem List: Decreased strength, Decreased endurance, Impaired balance, Decreased mobility, Impaired judgement, Decreased safety awareness  Assessment: Pt is a 78 y o  female seen for PT evaluation s/p admission to [address] [place] [address] on 5/3/2021 with Pneumonia due to COVID-19 virus  Order placed for PT services  Upon evaluation: Pt is presenting with impaired functional mobility due to decreased strength, decreased endurance, impaired balance, gait deviations, decreased safety awareness, impaired judgment and fall risk requiring stand by to steadying assistance for transfers and steadying to minimal assistance for ambulation with out AD with LOB to the right when turning head left   Pt's clinical presentation is currently unpredictable given the functional mobility deficits above, especially weakness, decreased endurance, gait deviations, decreased activity tolerance, decreased functional mobility tolerance, decreased safety awareness, impaired judgement and SOB upon exertion, coupled with fall risks as indicated by AM-PAC 6-Clicks: 88/75 as well as impaired balance, polypharmacy, impaired judgement and decreased safety awareness and combined with medical complications of abnormal renal lab values, abnormal H&H, abnormal sodium values, need for input for mobility technique/safety and abnormal CRP, BNP and D dimer  Pt's PMHx and comorbidities that may affect physical performance and progress include: HTN and arthritis  Personal factors affecting pt at time of IE include: anxiety, step(s) to enter environment, inability to perform ADLs, inability to navigate level surfaces without external assistance, inability to ambulate household distances and limited insight into impairments  Pt will benefit from continued skilled PT services to address deficits as defined above and to maximize level of functional mobility to facilitate return toward PLOF and improved QOL  From PT/mobility standpoint, recommendation at time of d/c would be Home PT, home with family support and with walker pending progress in order to reduce fall risk and maximize pt's functional independence and consistency with mobility in order to facilitate return to PLOF  Recommend trial with walker next 1-2 sessions, trial with cane next 1-2 sessions and ther ex next 1-2 sessions  Barriers to Discharge: Inaccessible home environment  Barriers to Discharge Comments: pt requires assistance wiht all mobility     PT Discharge Recommendation: Home with home health rehabilitation     PT - OK to Discharge: (when medically cleared to home with inc family support)    See flowsheet documentation for full assessment

## 2021-05-07 NOTE — OCCUPATIONAL THERAPY NOTE
Occupational Therapy Evaluation      [place] Felicit[place]    5/7/2021    Principal Problem:    Pneumonia due to COVID-19 virus  Active Problems:    Abnormal finding on CT scan    Gastroesophageal reflux disease without esophagitis    Essential hypertension    SUSANA (acute kidney injury) ([facility] [address] )    Anxiety    Hyponatremia    Acute infectious diarrhea      Past Medical History:   Diagnosis Date    Arthritis     GERD (gastroesophageal reflux disease)     Hypertension        Past Surgical History:   Procedure Laterality Date    NO PAST SURGERIES      SINUS SURGERY          05/07/21 1122   OT Last Visit   OT Visit Date 05/07/21   Note Type   Note type Evaluation   Restrictions/Precautions   Other Precautions Airborne/isolation;Contact/isolation; Chair Alarm; Bed Alarm; Fall Risk   Pain Assessment   Pain Assessment Tool Pain Assessment not indicated - pt denies pain   Pain Score No Pain   Home Living   Type of Home House  (1 ROSARIO)   Home Layout One level;Performs ADLs on one level; Able to live on main level with bedroom/bathroom  (rail with steps to basement (doesn't need to use))   Bathroom Shower/Tub Tub/shower unit   [address] [place] [address] Dr pagan  (doesn't use shower chair)   [address] [facility]  (used at times at her house, but is staying w her dtr)   Additional Comments Reports staying with her dtr at this time in a [facility] rou  otherwise lives with her son in a 1Sh with 3 ROSARIO w B HRs  Prior Function   Level of [place] Independent with ADLs and functional mobility   Lives With Daughter; Other (Comment)  (grandchildren)   Receives Help From Family   ADL Assistance Independent   IADLs Needs assistance  (assistance for cleaning  pt helps with cooking)   Falls in the last 6 months 0   Comments Reports being independent with mobility without AD  Has a cane at her sons home which she uses prn  However, doesn't use at her dtrs      ADL   UB Bathing Assistance 5  Supervision/Setup   LB Bathing Assistance (CGA)   UB Dressing Assistance 5  Supervision/Setup   LB [place]  4  Minimal Assistance   Toileting Deficit Setup;Steadying; Impulsive;Verbal cueing;Supervison/safety; Increased time to complete;Grab bar use   Bed Mobility   Supine to Sit 6  Modified independent   Additional items Increased time required   Transfers   Sit to Stand   (SBA)   Additional items Assist x 1; Increased time required;Verbal cues   Stand to Sit   (SBA)   Additional items Assist x 1; Increased time required;Verbal cues   Stand pivot   (SBA)   Additional items Assist x 1; Increased time required;Verbal cues   Additional Comments no AD for transfers  sit<>stand without AD with stand by assistance for balance  spt without AD with steadying assistance  short shuffled steps  Balance   Static Sitting Normal   Dynamic Sitting Good   Static Standing Fair   Dynamic Standing Fair -   Ambulatory Poor +   Activity Tolerance   Activity Tolerance Patient limited by fatigue   Nurse Made Aware RN Tiff   RUE Assessment   RUE Assessment X  (AROM WFL)   RUE Strength   RUE Overall Strength Deficits  (3/5)   LUE Assessment   LUE Assessment WFL   Hand Function   Gross Motor Coordination Functional   Fine Motor Coordination Functional   Cognition   Arousal/Participation Cooperative   Attention Attends with cues to redirect   Orientation Level Oriented X4   Memory Decreased recall of precautions   Following Commands Follows one step commands without difficulty   Assessment   Limitation Decreased ADL status; Decreased UE strength;Decreased Safe judgement during ADL;Decreased endurance;Decreased self-care trans;Decreased high-level ADLs   Prognosis Good   Assessment Pt is a 78 y o  female seen for OT evaluation s/p [facility] on 5/3/2021 w/ Pneumonia due to COVID-19 virus   Comorbidities affecting pt's functional performance at time of assessment include: Pneumonia due to COVID-19, HTN, SUSANA, Anxiety, Hyponatremia  Personal factors affecting pt at time of IE include:steps to enter environment, difficulty performing ADLS, difficulty performing IADLS  and limited insight into deficits  Prior to admission, pt was Mod I with ADLs  Upon evaluation: the following deficits impact occupational performance: decreased strength, decreased balance, decreased tolerance, impaired problem solving and decreased safety awareness  Pt to benefit from continued skilled OT tx while in the hospital to address deficits as defined above and maximize level of functional independence w ADL's and functional mobility  Occupational Performance areas to address include: bathing/shower, toilet hygiene, dressing, functional mobility and clothing management  From OT standpoint, recommendation at time of d/c would be Home OT  Goals   Patient Goals "Go home"   Plan   Treatment Interventions ADL retraining;Functional transfer training; Endurance training;Neuromuscular reeducation; Energy conservation   Goal Expiration Date 05/17/21   OT Treatment Day 0   OT Frequency 3-5x/wk   Recommendation   OT Discharge Recommendation Home with home health rehabilitation   Equipment Recommended Other (comment)  (RW)   OT - OK to Discharge Yes  (when medically stable)   Additional Comments  The patient's raw score on the AM-PAC Daily Activity inpatient short form is 20, standardized score is 42 03, greater than 39 4  Patients at this level are likely to benefit from discharge to home  Please refer to the recommendation of the Occupational Therapist for safe discharge planning     AM-PAC Daily Activity Inpatient   Lower Body Dressing 3   Bathing 3   Toileting 3   Upper Body Dressing 3   Grooming 4   Eating 4   Daily Activity Raw Score 20   Daily Activity Standardized Score (Calc for Raw Score >=11) 42 03   AM-PAC Applied Cognition Inpatient   Following a Speech/Presentation 3   Understanding Ordinary Conversation 3   Taking Medications 2   Remembering Where Things Are Placed or Put Away 2   Remembering List of 4-5 Errands 2   Taking Care of Complicated Tasks 2   Applied Cognition Raw Score 14   Applied Cognition Standardized Score 32 02     GOALS:    Pt will achieve the following within specified time frame: STG  Pt will achieve the following goals within 5 days    *ADL transfers with (S) for inc'd independence with ADLs/purposeful tasks    *UB ADL with (S) for inc'd independence with self cares    *LB ADL with CGA using AE prn for inc'd independence with self cares    *Toileting with CGA for clothing management and hygiene for return to PLOF with personal care    *Increase static stand balance to F+ and dyn stand balance to F for inc'd safety with standing purposeful tasks    *Increase stand tolerance x5 m for inc'd tolerance with standing purposeful tasks    *Participate in 10m UE therex to increase overall stamina/activity tolerance for purposeful tasks    *Bed mobility- (I) for inc'd independence to manage own comfort and initiate EOB & OOB purposeful tasks    Pt will achieve the following within specified time frame: LTG  Pt will achieve the following goals within 10 days    *ADL transfers with (I) for inc'd independence with ADLs/purposeful tasks    *UB ADL with (I) for inc'd independence with self cares    *LB ADL with (S) using AE prn for inc'd independence with self cares    *Toileting with (S) for clothing management and hygiene for return to PLOF with personal care    *Increase static stand balance to G- and dyn stand balance to F+ for inc'd safety with standing purposeful tasks    *Increase stand tolerance x7 m for inc'd tolerance with standing purposeful tasks      Román Stacy, MS, OTR/L

## 2021-05-07 NOTE — DISCHARGE SUMMARY
[address]  Discharge- Malgorzata Mims 1940, 78 y o  female MRN: [id number]  Unit/Bed#: [facility] Encounter: [id number]  Primary Care Provider: Amee Royal MD   Date and time admitted to hospital: 5/3/2021  6:51 PM    * Pneumonia due to COVID-19 virus  Assessment & Plan  · Presents with congestion, myalgia, nausea, and fatigue  · All household members have COVID  · COVID testing:  Positive symptoms since Thursday 4/29 as discussed with the daughter suspect could be more that is wise discussed with the daughter will proceed with quarantine after she tested positive from that date which is 5 /3  · Not requiring supplemental oxygen:  Saturation is 95% on room air  · CTA chest PE study: Extensive multifocal groundglass opacities correlating to the history of Covid-19 pneumonia/pneumonitis  No PE  · BNP 20 80, , trop <0 02  · CRP 43 D-dimer 1 12 ferritin is normal- CRP has been trending up but patient has no more fevers now and clinically she is improved from respiratory standpoint in no diarrhea just to some nausea today from breakfast  · Oxygen protocol  · Two procalcitonin is are negative discontinue antibiotics  · Remdesivir 5/5  · Vitamin-C vitamin-D zinc  · Respiratory protocol  · Heparin  · Patient her home O2 studies done today does not require any O2 no fever since 24 hours ago  No shortness of breath no major cough  No recurrence of nausea yesterday after breakfast she did well with dinner but today because she does not like the breakfast she actually had some episode of vomiting  Will give her last dose of remdesivir now recheck her after lunch if she has no nausea and PT states she is safe to go will discharge home  · Placed on Tessalon Perles p r n  · Patient has been evaluated by PT will be sent home with walker and home health aide    Also no nausea after lunch  · Will discharge her on vitamins quarantine till May 13  · No need for anticoagulation on discharge    Acute infectious diarrhea  Assessment & Plan  · Secondary COVID-19  Resolved post imodium    Hyponatremia  Assessment & Plan  · Sodium 134 secondary to acute diarrhea with adequate intake  Anxiety  Assessment & Plan  · Currently controlled  · Continue Effexor  · Continue Ativan as needed    SUSANA (acute kidney injury) ([facility] [address] )  Assessment & Plan  · On CKD stage 3 Creatinine 1 82  Baseline around 1 4 -1 5 from [facility] records  Suspect multifactorial due to use of Ace inhibitor and decreased oral intake with acute diarrhea resolved no diarrhea  · Resolved    Essential hypertension  Assessment & Plan  · BP reviewed  · Continue Corgard decreased to 20 mg she is having some sinus bradycardia in the 50  · Can restart her Maxzide and continue her vasotec    Gastroesophageal reflux disease without esophagitis  Assessment & Plan  · Currently controlled-takes omeprazole-will be formulary substituted with Protonix  · Monitor      Abnormal finding on CT scan  Assessment & Plan  · CTA chest: Scattered splenic, hepatic and bilateral hilar calcified granulomata  · Outpatient follow-up if indicated       Discharging Physician / Practitioner: Brian Pratt MD  PCP: Francesco Duncan MD  Admission Date:   Admission Orders (From admission, onward)     Ordered        05/03/21 2127  Inpatient Admission  Once                   Discharge Date: 05/07/21    Resolved Problems  Date Reviewed: 5/7/2021    None          Consultations During Hospital Stay:  · none    Procedures Performed:     · none    Significant Findings / Test Results:     · cta pe- 1  Extensive multifocal groundglass opacities correlating to the history of Covid-19 pneumonia/pneumonitis  2   Scattered splenic, hepatic and bilateral hilar calcified granulomata  3   Cholelithiasis  4   No pulmonary embolism        Incidental Findings:   · See above     Test Results Pending at Discharge (will require follow up):    · None     Outpatient Tests Requested:  · Chest x-ray in 4 weeks    Complications:  None    Reason for Admission:  Symptomatic COVID-19    Hospital Course:     Kayla Chacko is a 78 y o  female patient who originally presented to the hospital on 5/3/2021 due to congestion myalgias fatigue and nausea  Patient was found to have COVID-19 pneumonia admitted the start of remdesivir and also she had associated diarrhea she did not require oxygen at all times  She completed the course also the vitamins antibiotics discontinued as her procalcitonin is were negative x2 diarrhea has resolved over time  She had some associated nausea periodically which has resolved as well her temperatures have resolved she has been afebrile for 24 hours  Eating and drinking patient did not qualify for oxygen she will be discharged home with quarantine till [phone] [id number] she did not qualify for convalescent plasma she has never been on oxygen  I did decrease her nadolol to 20 mg daily secondary to sinus bradycardia  Azam I resolved    Please see above list of diagnoses and related plan for additional information  Condition at Discharge: stable     Discharge Day Visit / Exam:     * Please refer to separate progress note for these details *    Discussion with Family: daughter    Discharge instructions/Information to patient and family:   See after visit summary for information provided to patient and family  Provisions for Follow-Up Care:  See after visit summary for information related to follow-up care and any pertinent home health orders  Disposition:     Home with VNA Services (Reminder: Complete face to face encounter)    For Discharges to [facility] SNF:   · Not Applicable to this Patient - Not Applicable to this Patient    Planned Readmission: no     Discharge Statement:  I spent >35 minutes discharging the patient  This time was spent on the day of discharge  I had direct contact with the patient on the day of discharge   Greater than 50% of the total time was spent examining patient, answering all patient questions, arranging and discussing plan of care with patient as well as directly providing post-discharge instructions  Additional time then spent on discharge activities  Discharge Medications:  See after visit summary for reconciled discharge medications provided to patient and family        ** Please Note: This note has been constructed using a voice recognition system **

## 2021-05-07 NOTE — ASSESSMENT & PLAN NOTE
· Presents with congestion, myalgia, nausea, and fatigue  · All household members have Benito  · COVID testing:  Positive symptoms since Thursday 4/29 as discussed with the daughter suspect could be more that is wise discussed with the daughter will proceed with quarantine after she tested positive from that date which is 5 /3  · Not requiring supplemental oxygen:  Saturation is 95% on room air  · CTA chest PE study: Extensive multifocal groundglass opacities correlating to the history of Covid-19 pneumonia/pneumonitis  No PE  · BNP 20 80, , trop <0 02  · CRP 43 D-dimer 1 12 ferritin is normal- CRP has been trending up but patient has no more fevers now and clinically she is improved from respiratory standpoint in no diarrhea just to some nausea today from breakfast  · Oxygen protocol  · Two procalcitonin is are negative discontinue antibiotics  · Remdesivir 5/5  · Vitamin-C vitamin-D zinc  · Respiratory protocol  · Heparin  · Patient her home O2 studies done today does not require any O2 no fever since 24 hours ago  No shortness of breath no major cough  No recurrence of nausea yesterday after breakfast she did well with dinner but today because she does not like the breakfast she actually had some episode of vomiting  Will give her last dose of remdesivir now recheck her after lunch if she has no nausea and PT states she is safe to go will discharge home  · Placed on Tessalon Perles p r n

## 2021-05-07 NOTE — ASSESSMENT & PLAN NOTE
· Presents with congestion, myalgia, nausea, and fatigue  · All household members have Benito  · COVID testing:  Positive symptoms since Thursday 4/29 as discussed with the daughter suspect could be more that is wise discussed with the daughter will proceed with quarantine after she tested positive from that date which is 5 /3  · Not requiring supplemental oxygen:  Saturation is 95% on room air  · CTA chest PE study: Extensive multifocal groundglass opacities correlating to the history of Covid-19 pneumonia/pneumonitis  No PE  · BNP 20 80, , trop <0 02  · CRP 43 D-dimer 1 12 ferritin is normal- CRP has been trending up but patient has no more fevers now and clinically she is improved from respiratory standpoint in no diarrhea just to some nausea today from breakfast  · Oxygen protocol  · Two procalcitonin is are negative discontinue antibiotics  · Remdesivir 5/5  · Vitamin-C vitamin-D zinc  · Respiratory protocol  · Heparin  · Patient her home O2 studies done today does not require any O2 no fever since 24 hours ago  No shortness of breath no major cough  No recurrence of nausea yesterday after breakfast she did well with dinner but today because she does not like the breakfast she actually had some episode of vomiting  Will give her last dose of remdesivir now recheck her after lunch if she has no nausea and PT states she is safe to go will discharge home  · Placed on Tesnickolason Perles p r n  · Patient has been evaluated by PT will be sent home with walker and home health aide    Also no nausea after lunch  · Will discharge her on vitamins quarantine till May 13  · No need for anticoagulation on discharge

## 2021-05-09 LAB
BACTERIA BLD CULT: NORMAL
BACTERIA BLD CULT: NORMAL

## 2021-05-10 NOTE — CASE MANAGEMENT
Cm received call from [facility] at [facility], who sts the attempted to see pt this weekend, but pt and daughter refused to be sen by Jassi Comment Thursday would be a "beter" day for them    Urvashi Fairchild will continue to follow

## 2021-06-09 ENCOUNTER — HOSPITAL ENCOUNTER (OUTPATIENT)
Dept: RADIOLOGY | Facility: HOSPITAL | Age: 81
Discharge: HOME | End: 2021-06-09
Attending: FAMILY MEDICINE
Payer: COMMERCIAL

## 2021-06-09 DIAGNOSIS — J12.82 PNEUMONIA DUE TO COVID-19 VIRUS: ICD-10-CM

## 2021-06-09 DIAGNOSIS — U07.1 PNEUMONIA DUE TO COVID-19 VIRUS: ICD-10-CM

## 2021-06-09 PROCEDURE — 71046 X-RAY EXAM CHEST 2 VIEWS: CPT

## 2022-10-12 PROBLEM — J12.82 PNEUMONIA DUE TO COVID-19 VIRUS: Status: RESOLVED | Noted: 2021-05-03 | Resolved: 2022-10-12

## 2022-10-12 PROBLEM — A09 ACUTE INFECTIOUS DIARRHEA: Status: RESOLVED | Noted: 2021-05-04 | Resolved: 2022-10-12

## 2022-10-12 PROBLEM — U07.1 PNEUMONIA DUE TO COVID-19 VIRUS: Status: RESOLVED | Noted: 2021-05-03 | Resolved: 2022-10-12

## 2022-11-30 ENCOUNTER — OFFICE VISIT (OUTPATIENT)
Dept: FAMILY MEDICINE CLINIC | Facility: CLINIC | Age: 82
End: 2022-11-30

## 2022-11-30 VITALS
WEIGHT: 167.8 LBS | SYSTOLIC BLOOD PRESSURE: 120 MMHG | BODY MASS INDEX: 29.73 KG/M2 | DIASTOLIC BLOOD PRESSURE: 68 MMHG | HEIGHT: 63 IN | OXYGEN SATURATION: 98 % | HEART RATE: 56 BPM | TEMPERATURE: 97.5 F

## 2022-11-30 DIAGNOSIS — Z23 FLU VACCINE NEED: ICD-10-CM

## 2022-11-30 DIAGNOSIS — E78.5 DYSLIPIDEMIA, GOAL TO BE DETERMINED: ICD-10-CM

## 2022-11-30 DIAGNOSIS — N18.30 STAGE 3 CHRONIC KIDNEY DISEASE, UNSPECIFIED WHETHER STAGE 3A OR 3B CKD (HCC): ICD-10-CM

## 2022-11-30 DIAGNOSIS — K21.00 GASTROESOPHAGEAL REFLUX DISEASE WITH ESOPHAGITIS WITHOUT HEMORRHAGE: ICD-10-CM

## 2022-11-30 DIAGNOSIS — F33.9 EPISODE OF RECURRENT MAJOR DEPRESSIVE DISORDER, UNSPECIFIED DEPRESSION EPISODE SEVERITY (HCC): ICD-10-CM

## 2022-11-30 DIAGNOSIS — I10 ESSENTIAL HYPERTENSION: Primary | ICD-10-CM

## 2022-11-30 DIAGNOSIS — J32.9 SINUSITIS, UNSPECIFIED CHRONICITY, UNSPECIFIED LOCATION: ICD-10-CM

## 2022-11-30 DIAGNOSIS — E55.9 VITAMIN D DEFICIENCY: ICD-10-CM

## 2022-11-30 PROBLEM — F41.1 ANXIETY, GENERALIZED: Status: ACTIVE | Noted: 2021-02-12

## 2022-11-30 PROBLEM — R73.03 PREDIABETES: Status: ACTIVE | Noted: 2021-12-13

## 2022-11-30 PROBLEM — N18.9 HYPERTENSIVE HEART DISEASE, BENIGN W/CHRONIC KIDNEY DISEASE STAGE 1-4: Status: ACTIVE | Noted: 2017-09-15

## 2022-11-30 PROBLEM — M19.90 OSTEOARTHRITIS: Status: ACTIVE | Noted: 2017-03-13

## 2022-11-30 PROBLEM — T39.395A NEPHROPATHY DUE TO NONSTEROIDAL ANTI-INFLAMMATORY DRUG (NSAID): Status: ACTIVE | Noted: 2017-03-13

## 2022-11-30 PROBLEM — H40.1231 LOW-TENSION GLAUCOMA, BILATERAL, MILD STAGE: Status: ACTIVE | Noted: 2020-06-11

## 2022-11-30 PROBLEM — N14.19 NEPHROPATHY DUE TO NONSTEROIDAL ANTI-INFLAMMATORY DRUG (NSAID): Status: ACTIVE | Noted: 2017-03-13

## 2022-11-30 PROBLEM — I13.10 HYPERTENSIVE HEART DISEASE, BENIGN W/CHRONIC KIDNEY DISEASE STAGE 1-4: Status: ACTIVE | Noted: 2017-09-15

## 2022-11-30 RX ORDER — LATANOPROST 50 UG/ML
SOLUTION/ DROPS OPHTHALMIC
COMMUNITY
Start: 2022-11-03

## 2022-11-30 RX ORDER — SENNOSIDES 8.6 MG
650 CAPSULE ORAL
COMMUNITY

## 2022-11-30 RX ORDER — DOXYCYCLINE HYCLATE 100 MG/1
100 CAPSULE ORAL EVERY 12 HOURS SCHEDULED
Qty: 14 CAPSULE | Refills: 0 | Status: SHIPPED | OUTPATIENT
Start: 2022-11-30 | End: 2022-12-07

## 2022-11-30 NOTE — ASSESSMENT & PLAN NOTE
Lab Results   Component Value Date    EGFR 42 05/07/2021    EGFR 36 05/06/2021    EGFR 32 05/05/2021    CREATININE 1 21 05/07/2021    CREATININE 1 38 (H) 05/06/2021    CREATININE 1 52 (H) 05/05/2021   continue meds, no changes

## 2022-11-30 NOTE — PROGRESS NOTES
Assessment/Plan:       1  Essential hypertension  Assessment & Plan:  Continue meds, no changes    Orders:  -     CBC and differential; Future  -     Comprehensive metabolic panel; Future  -     Lipid panel; Future    2  Gastroesophageal reflux disease with esophagitis without hemorrhage  Assessment & Plan:  Continue meds, no changes      3  Stage 3 chronic kidney disease, unspecified whether stage 3a or 3b CKD [facility])  Assessment & Plan:  Lab Results   Component Value Date    EGFR 42 05/07/2021    EGFR 36 05/06/2021    EGFR 32 05/05/2021    CREATININE 1 21 05/07/2021    CREATININE 1 38 (H) 05/06/2021    CREATININE 1 52 (H) 05/05/2021   continue meds, no changes      4  Dyslipidemia, goal to be determined  Assessment & Plan:  Continue meds, no changes      5  Vitamin D deficiency  Comments:  start supplementation and check levels  Orders:  -     VITAMIN D, 25 HYDROXY, TOTAL; Future    6  Flu vaccine need  -     influenza vaccine, high-dose, PF 0 7 mL (FLUZONE HIGH-DOSE)    7  Episode of recurrent major depressive disorder, unspecified depression episode severity ([facility] [address] )    8  Sinusitis, unspecified chronicity, unspecified location  -     doxycycline hyclate (VIBRAMYCIN) 100 mg capsule; Take 1 capsule (100 mg total) by mouth every 12 (twelve) hours for 7 days        Subjective:      Patient ID: Lynette Beckham is a 78 y o  female  [place] is here for her initial visit  She is a very healthy and pleasant 57-year-old woman  She comes [facility]  She has a history of hypertension, anxiety, hyperlipidemia, heartburn, and a CKD  Having stable on her meds which were reviewed  She has chronic sinus issues and sinus infections but has some issues with antibiotics  Doxycycline would be appropriate if necessary  She has no active chest pain  We discussed importance of healthy diet exercise and using an assistive device to walk  She does want a flu shot today        The following portions of the patient's history were reviewed and updated as appropriate: allergies, current medications, past family history, past medical history, past social history, past surgical history, and problem list     Review of Systems   Respiratory: Negative  Cardiovascular: Negative  Gastrointestinal: Negative  All other systems reviewed and are negative  Objective:      /68 (BP Location: Left arm, Patient Position: Sitting, Cuff Size: Adult)   Pulse 56   Temp 97 5 °F (36 4 °C)   Ht 5' 3" (1 6 m)   Wt 76 1 kg (167 lb 12 8 oz)   SpO2 98%   BMI 29 72 kg/m²          Physical Exam  Vitals and nursing note reviewed  Constitutional:       Appearance: Normal appearance  HENT:      Head: Normocephalic and atraumatic  Nose: Nose normal       Mouth/Throat:      Mouth: Mucous membranes are moist    Eyes:      Extraocular Movements: Extraocular movements intact  Pupils: Pupils are equal, round, and reactive to light  Cardiovascular:      Rate and Rhythm: Normal rate and regular rhythm  Pulses: Normal pulses  Pulmonary:      Effort: Pulmonary effort is normal       Breath sounds: Normal breath sounds  Abdominal:      General: Bowel sounds are normal       Palpations: Abdomen is soft  Musculoskeletal:      Cervical back: Normal range of motion  Skin:     General: Skin is warm and dry  Capillary Refill: Capillary refill takes less than 2 seconds  Neurological:      General: No focal deficit present  Mental Status: She is alert     Psychiatric:         Mood and Affect: Mood normal

## 2023-04-24 DIAGNOSIS — J12.82 PNEUMONIA DUE TO COVID-19 VIRUS: ICD-10-CM

## 2023-04-24 DIAGNOSIS — U07.1 PNEUMONIA DUE TO COVID-19 VIRUS: ICD-10-CM

## 2023-04-24 RX ORDER — NADOLOL 20 MG/1
20 TABLET ORAL DAILY
Qty: 30 TABLET | Refills: 0 | Status: SHIPPED | OUTPATIENT
Start: 2023-04-24

## 2023-05-23 ENCOUNTER — LAB (OUTPATIENT)
Dept: LAB | Facility: CLINIC | Age: 83
End: 2023-05-23

## 2023-05-23 DIAGNOSIS — E55.9 VITAMIN D DEFICIENCY: ICD-10-CM

## 2023-05-23 DIAGNOSIS — I10 ESSENTIAL HYPERTENSION: ICD-10-CM

## 2023-05-23 DIAGNOSIS — E87.5 HYPERKALEMIA: Primary | ICD-10-CM

## 2023-05-23 LAB
25(OH)D3 SERPL-MCNC: 15.4 NG/ML (ref 30–100)
ALBUMIN SERPL BCP-MCNC: 3.8 G/DL (ref 3.5–5)
ALP SERPL-CCNC: 75 U/L (ref 46–116)
ALT SERPL W P-5'-P-CCNC: 16 U/L (ref 12–78)
ANION GAP SERPL CALCULATED.3IONS-SCNC: 3 MMOL/L (ref 4–13)
AST SERPL W P-5'-P-CCNC: 16 U/L (ref 5–45)
BASOPHILS # BLD AUTO: 0.11 THOUSANDS/ÂΜL (ref 0–0.1)
BASOPHILS NFR BLD AUTO: 1 % (ref 0–1)
BILIRUB SERPL-MCNC: 0.62 MG/DL (ref 0.2–1)
BUN SERPL-MCNC: 40 MG/DL (ref 5–25)
CALCIUM SERPL-MCNC: 9.9 MG/DL (ref 8.3–10.1)
CHLORIDE SERPL-SCNC: 111 MMOL/L (ref 96–108)
CHOLEST SERPL-MCNC: 161 MG/DL
CO2 SERPL-SCNC: 23 MMOL/L (ref 21–32)
CREAT SERPL-MCNC: 1.67 MG/DL (ref 0.6–1.3)
EOSINOPHIL # BLD AUTO: 0.25 THOUSAND/ÂΜL (ref 0–0.61)
EOSINOPHIL NFR BLD AUTO: 3 % (ref 0–6)
ERYTHROCYTE [DISTWIDTH] IN BLOOD BY AUTOMATED COUNT: 13.3 % (ref 11.6–15.1)
GFR SERPL CREATININE-BSD FRML MDRD: 27 ML/MIN/1.73SQ M
GLUCOSE P FAST SERPL-MCNC: 118 MG/DL (ref 65–99)
HCT VFR BLD AUTO: 40.7 % (ref 34.8–46.1)
HDLC SERPL-MCNC: 47 MG/DL
HGB BLD-MCNC: 12.3 G/DL (ref 11.5–15.4)
IMM GRANULOCYTES # BLD AUTO: 0.07 THOUSAND/UL (ref 0–0.2)
IMM GRANULOCYTES NFR BLD AUTO: 1 % (ref 0–2)
LDLC SERPL CALC-MCNC: 69 MG/DL (ref 0–100)
LYMPHOCYTES # BLD AUTO: 2.47 THOUSANDS/ÂΜL (ref 0.6–4.47)
LYMPHOCYTES NFR BLD AUTO: 30 % (ref 14–44)
MCH RBC QN AUTO: 29.4 PG (ref 26.8–34.3)
MCHC RBC AUTO-ENTMCNC: 30.2 G/DL (ref 31.4–37.4)
MCV RBC AUTO: 97 FL (ref 82–98)
MONOCYTES # BLD AUTO: 0.77 THOUSAND/ÂΜL (ref 0.17–1.22)
MONOCYTES NFR BLD AUTO: 9 % (ref 4–12)
NEUTROPHILS # BLD AUTO: 4.56 THOUSANDS/ÂΜL (ref 1.85–7.62)
NEUTS SEG NFR BLD AUTO: 56 % (ref 43–75)
NONHDLC SERPL-MCNC: 114 MG/DL
NRBC BLD AUTO-RTO: 0 /100 WBCS
PLATELET # BLD AUTO: 293 THOUSANDS/UL (ref 149–390)
PMV BLD AUTO: 10.8 FL (ref 8.9–12.7)
POTASSIUM SERPL-SCNC: 5.7 MMOL/L (ref 3.5–5.3)
PROT SERPL-MCNC: 7.7 G/DL (ref 6.4–8.4)
RBC # BLD AUTO: 4.18 MILLION/UL (ref 3.81–5.12)
SODIUM SERPL-SCNC: 137 MMOL/L (ref 135–147)
TRIGL SERPL-MCNC: 223 MG/DL
WBC # BLD AUTO: 8.23 THOUSAND/UL (ref 4.31–10.16)

## 2023-05-25 ENCOUNTER — APPOINTMENT (OUTPATIENT)
Dept: LAB | Facility: CLINIC | Age: 83
End: 2023-05-25

## 2023-05-25 DIAGNOSIS — E87.5 HYPERKALEMIA: ICD-10-CM

## 2023-05-25 LAB
ANION GAP SERPL CALCULATED.3IONS-SCNC: 3 MMOL/L (ref 4–13)
BUN SERPL-MCNC: 40 MG/DL (ref 5–25)
CALCIUM SERPL-MCNC: 10 MG/DL (ref 8.3–10.1)
CHLORIDE SERPL-SCNC: 112 MMOL/L (ref 96–108)
CO2 SERPL-SCNC: 21 MMOL/L (ref 21–32)
CREAT SERPL-MCNC: 1.53 MG/DL (ref 0.6–1.3)
GFR SERPL CREATININE-BSD FRML MDRD: 30 ML/MIN/1.73SQ M
GLUCOSE SERPL-MCNC: 131 MG/DL (ref 65–140)
POTASSIUM SERPL-SCNC: 5.6 MMOL/L (ref 3.5–5.3)
SODIUM SERPL-SCNC: 136 MMOL/L (ref 135–147)

## 2023-05-31 ENCOUNTER — OFFICE VISIT (OUTPATIENT)
Dept: FAMILY MEDICINE CLINIC | Facility: CLINIC | Age: 83
End: 2023-05-31

## 2023-05-31 VITALS
TEMPERATURE: 97.8 F | OXYGEN SATURATION: 99 % | HEIGHT: 63 IN | WEIGHT: 164.6 LBS | BODY MASS INDEX: 29.16 KG/M2 | DIASTOLIC BLOOD PRESSURE: 78 MMHG | HEART RATE: 71 BPM | SYSTOLIC BLOOD PRESSURE: 130 MMHG

## 2023-05-31 DIAGNOSIS — E87.5 HYPERKALEMIA: ICD-10-CM

## 2023-05-31 DIAGNOSIS — E78.2 MIXED HYPERLIPIDEMIA: ICD-10-CM

## 2023-05-31 DIAGNOSIS — I10 BENIGN ESSENTIAL HTN: ICD-10-CM

## 2023-05-31 DIAGNOSIS — J12.82 PNEUMONIA DUE TO COVID-19 VIRUS: ICD-10-CM

## 2023-05-31 DIAGNOSIS — U07.1 PNEUMONIA DUE TO COVID-19 VIRUS: ICD-10-CM

## 2023-05-31 DIAGNOSIS — N18.30 STAGE 3 CHRONIC KIDNEY DISEASE, UNSPECIFIED WHETHER STAGE 3A OR 3B CKD (HCC): ICD-10-CM

## 2023-05-31 DIAGNOSIS — F32.89 OTHER DEPRESSION: Primary | ICD-10-CM

## 2023-05-31 DIAGNOSIS — Z00.00 MEDICARE ANNUAL WELLNESS VISIT, SUBSEQUENT: ICD-10-CM

## 2023-05-31 RX ORDER — ENALAPRIL MALEATE 10 MG/1
10 TABLET ORAL DAILY
Qty: 90 TABLET | Refills: 3 | Status: SHIPPED | OUTPATIENT
Start: 2023-05-31

## 2023-05-31 RX ORDER — VENLAFAXINE HYDROCHLORIDE 37.5 MG/1
37.5 CAPSULE, EXTENDED RELEASE ORAL DAILY
Qty: 90 CAPSULE | Refills: 3 | Status: SHIPPED | OUTPATIENT
Start: 2023-05-31

## 2023-05-31 RX ORDER — NADOLOL 20 MG/1
20 TABLET ORAL DAILY
Qty: 90 TABLET | Refills: 3 | Status: SHIPPED | OUTPATIENT
Start: 2023-05-31

## 2023-05-31 RX ORDER — OMEPRAZOLE 40 MG/1
40 CAPSULE, DELAYED RELEASE ORAL DAILY
Qty: 90 CAPSULE | Refills: 3 | Status: SHIPPED | OUTPATIENT
Start: 2023-05-31

## 2023-05-31 RX ORDER — ATORVASTATIN CALCIUM 20 MG/1
20 TABLET, FILM COATED ORAL DAILY
Qty: 90 TABLET | Refills: 3 | Status: SHIPPED | OUTPATIENT
Start: 2023-05-31

## 2023-05-31 NOTE — PROGRESS NOTES
Assessment and Plan:     Problem List Items Addressed This Visit        Genitourinary    Chronic renal disease, stage III ([facility] [address] )       Other    Medicare annual wellness visit, subsequent     I have reviewed the nurse practitioner's note and agree with the workup and plan  Other Visit Diagnoses     Other depression    -  Primary    Relevant Medications    venlafaxine (EFFEXOR-XR) 37 5 mg 24 hr capsule    Hyperkalemia        repeat bmp    Relevant Orders    Basic metabolic panel    Pneumonia due to COVID-19 virus        Relevant Medications    nadolol (CORGARD) 20 mg tablet    Mixed hyperlipidemia        Relevant Medications    atorvastatin (LIPITOR) 20 mg tablet    Benign essential HTN        Relevant Medications    nadolol (CORGARD) 20 mg tablet    enalapril (VASOTEC) 10 mg tablet    omeprazole (PriLOSEC) 40 MG capsule        BMI Counseling: Body mass index is 29 16 kg/m²  The BMI is above normal  Nutrition recommendations include decreasing portion sizes and encouraging healthy choices of fruits and vegetables  Rationale for BMI follow-up plan is due to patient being overweight or obese  Preventive health issues were discussed with patient, and age appropriate screening tests were ordered as noted in patient's After Visit Summary  Personalized health advice and appropriate referrals for health education or preventive services given if needed, as noted in patient's After Visit Summary  History of Present Illness:     Patient presents for a Medicare Wellness Visit    Malgrozata is doing ok  Labs reviewed  K was elevated  We are not sure why  Will repeat non-fasting  No CP or sob  She had a fall  It was a trip and fall  She does not feel the need for an assistive device  No depression  She understands healthy diet/exercise  She does not smoke  She sleeps well  Appetite is fine         Patient Care Team:  Lovenia Duane, MD as PCP - General (Family Medicine)  Jackie Griffin MD as PCP - PCP-[facility] ([facility])  Katherine Mcmillan MD [facility]     Review of Systems:     Review of Systems   Respiratory: Negative  Cardiovascular: Negative  Problem List:     Patient Active Problem List   Diagnosis   • Abnormal finding on CT scan   • Gastroesophageal reflux disease without esophagitis   • Essential hypertension   • [facility]   • Anxiety   • Hyponatremia   • Prediabetes   • Adjustment disorder with depressed mood   • Anxiety, generalized   • Chronic renal disease, stage III (HCC)   • Chronic rhinitis   • Dyslipidemia, goal to be determined   • Gastro-esophageal reflux disease with esophagitis   • Hypertensive heart disease, benign w/chronic kidney disease stage 1-4   • Low-tension glaucoma, bilateral, mild stage   • Major depressive disorder, recurrent, unspecified ([facility] )   • 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)   • Osteoarthritis   • Medicare annual wellness visit, subsequent      Past Medical and Surgical History:     Past Medical History:   Diagnosis Date   • Arthritis    • GERD (gastroesophageal reflux disease)    • Hypertension      Past Surgical History:   Procedure Laterality Date   • NO PAST SURGERIES     • SINUS SURGERY        Family History:     History reviewed  No pertinent family history  Social History:     Social History     Socioeconomic History   • Marital status:       Spouse name: None   • Number of children: None   • Years of education: None   • Highest education level: None   Occupational History   • None   Tobacco Use   • Smoking status: Never   • Smokeless tobacco: Never   Vaping Use   • Vaping Use: Never used   Substance and Sexual Activity   • Alcohol use: Not Currently   • Drug use: Never   • Sexual activity: Not Currently   Other Topics Concern   • None   Social History Narrative   • None     Social Determinants of Health     Financial Resource Strain: Low Risk  (5/31/2023)    Overall Financial Resource Strain (CARDIA)    • Difficulty of Paying Living Expenses: Not hard at all   Food Insecurity: Not on file   Transportation Needs: No Transportation Needs (5/31/2023)    PRAPARE - Transportation    • Lack of Transportation (Medical): No    • Lack of Transportation (Non-Medical): No   Physical Activity: Not on file   Stress: Not on file   Social Connections: Not on file   Intimate Partner Violence: Not on file   Housing Stability: Not on file      Medications and Allergies:     Current Outpatient Medications   Medication Sig Dispense Refill   • acetaminophen (TYLENOL) 325 mg tablet Take 650 mg by mouth every 6 (six) hours as needed for mild pain     • acetaminophen (TYLENOL) 650 mg CR tablet Take 650 mg by mouth     • atorvastatin (LIPITOR) 20 mg tablet Take 1 tablet (20 mg total) by mouth daily 90 tablet 3   • enalapril (VASOTEC) 10 mg tablet Take 1 tablet (10 mg total) by mouth daily 90 tablet 3   • fluticasone (FLONASE) 50 mcg/act nasal spray 1 spray into each nostril daily     • latanoprost (XALATAN) 0 005 % ophthalmic solution INSTILL 1 DROP INTO EACH EYE IN THE EVENING     • nadolol (CORGARD) 20 mg tablet Take 1 tablet (20 mg total) by mouth daily 90 tablet 3   • omeprazole (PriLOSEC) 40 MG capsule Take 1 capsule (40 mg total) by mouth daily 90 capsule 3   • Probiotic Product (PROBIOTIC-10 PO) Take by mouth     • triamterene-hydrochlorothiazide (MAXZIDE-25) 37 5-25 mg per tablet Take 1 tablet by mouth daily     • venlafaxine (EFFEXOR-XR) 37 5 mg 24 hr capsule Take 1 capsule (37 5 mg total) by mouth daily 90 capsule 3     No current facility-administered medications for this visit       Allergies   Allergen Reactions   • Statins Hives     myalgias   • Amoxicillin Hives   • Levofloxacin Hives     Joint pains   • Sulfa Antibiotics Hives      Immunizations:     Immunization History   Administered Date(s) Administered   • INFLUENZA 01/13/2017, 09/15/2017, 12/17/2018, 11/30/2022   • Influenza, Seasonal Vaccine, Quadrivalent, Adjuvanted,   5e 10/12/2020   • Influenza, high dose seasonal 0 7 mL 11/30/2022   • Influenza, seasonal, injectable 01/04/2001, 10/25/2005, 11/01/2006, 10/26/2007, 10/28/2008, 11/05/2009, 11/03/2010, 09/07/2012, 09/11/2013, 02/11/2015, 12/01/2015   • Pneumococcal Conjugate 13-Valent 12/17/2018   • Pneumococcal Polysaccharide PPV23 11/14/2000, 10/25/2005   • Tdap 04/29/2019      Health Maintenance: There are no preventive care reminders to display for this patient  There are no preventive care reminders to display for this patient  Medicare Screening Tests and Risk Assessments:         Health Risk Assessment:   Patient rates overall health as good  Patient feels that their physical health rating is same  Patient is satisfied with their life  Eyesight was rated as slightly worse  Hearing was rated as slightly worse  Patient feels that their emotional and mental health rating is same  Patients states they are never, rarely angry  Patient states they are always unusually tired/fatigued  Pain experienced in the last 7 days has been some  Patient's pain rating has been 1/10  Patient states that she has experienced no weight loss or gain in last 6 months  Fall Risk Screening: In the past year, patient has experienced: history of falling in past year    Number of falls: 1  Injured during fall?: Yes    Feels unsteady when standing or walking?: Yes    Worried about falling?: No      Urinary Incontinence Screening:   Patient has leaked urine accidently in the last six months  Home Safety:  Patient does not have trouble with stairs inside or outside of their home  Patient has working smoke alarms and has no working carbon monoxide detector  Home safety hazards include: none  Nutrition:   Current diet is Regular  Medications:   Patient is currently taking over-the-counter supplements  OTC medications include: see medication list  Patient is able to manage medications  "    Activities of Daily Living (ADLs)/Instrumental Activities of Daily Living (IADLs):   Walk and transfer into and out of bed and chair?: Yes  Dress and groom yourself?: Yes    Bathe or shower yourself?: Yes    Feed yourself? Yes  Do your laundry/housekeeping?: Yes  Manage your money, pay your bills and track your expenses?: Yes  Make your own meals?: Yes    Do your own shopping?: Yes    PREVENTIVE SCREENINGS      Cardiovascular Screening:    General: Screening Current      Diabetes Screening:     General: Screening Current      Colorectal Cancer Screening:     General: Screening Not Indicated      Cervical Cancer Screening:    General: Screening Not Indicated      Lung Cancer Screening:     General: Screening Not Indicated    Screening, Brief Intervention, and Referral to Treatment (SBIRT)    Screening  Typical number of drinks in a day: 0  Typical number of drinks in a week: 0  Interpretation: Low risk drinking behavior  No results found  Physical Exam:     /78 (BP Location: Left arm, Patient Position: Sitting, Cuff Size: Standard)   Pulse 71   Temp 97 8 °F (36 6 °C)   Ht 5' 3\" (1 6 m)   Wt 74 7 kg (164 lb 9 6 oz)   SpO2 99%   BMI 29 16 kg/m²     Physical Exam  Vitals and nursing note reviewed  Constitutional:       General: She is not in acute distress  Appearance: She is well-developed  HENT:      Head: Normocephalic and atraumatic  Eyes:      Conjunctiva/sclera: Conjunctivae normal    Cardiovascular:      Rate and Rhythm: Normal rate and regular rhythm  Heart sounds: No murmur heard  Pulmonary:      Effort: Pulmonary effort is normal  No respiratory distress  Breath sounds: Normal breath sounds  Abdominal:      Palpations: Abdomen is soft  Tenderness: There is no abdominal tenderness  Musculoskeletal:         General: No swelling  Cervical back: Neck supple  Skin:     General: Skin is warm and dry        Capillary Refill: Capillary refill takes less than 2 " seconds  Neurological:      Mental Status: She is alert     Psychiatric:         Mood and Affect: Mood normal           Audrey Bustillo MD

## 2023-05-31 NOTE — PATIENT INSTRUCTIONS
Medicare Preventive Visit Patient Instructions  Thank you for completing your Welcome to Medicare Visit or Medicare Annual Wellness Visit today  Your next wellness visit will be due in one year (5/31/2024)  The screening/preventive services that you may require over the next 5-10 years are detailed below  Some tests may not apply to you based off risk factors and/or age  Screening tests ordered at today's visit but not completed yet may show as past due  Also, please note that scanned in results may not display below  Preventive Screenings:  Service Recommendations Previous Testing/Comments   Colorectal Cancer Screening  * Colonoscopy    * Fecal Occult Blood Test (FOBT)/Fecal Immunochemical Test (FIT)  * Fecal DNA/Cologuard Test  * Flexible Sigmoidoscopy Age: 39-70 years old   Colonoscopy: every 10 years (may be performed more frequently if at higher risk)  OR  FOBT/FIT: every 1 year  OR  Cologuard: every 3 years  OR  Sigmoidoscopy: every 5 years  Screening may be recommended earlier than age 39 if at higher risk for colorectal cancer  Also, an individualized decision between you and your healthcare provider will decide whether screening between the ages of 74-80 would be appropriate  Colonoscopy: Not on file  FOBT/FIT: Not on file  Cologuard: Not on file  Sigmoidoscopy: Not on file          Breast Cancer Screening Age: 36 years old  Frequency: every 1-2 years  Not required if history of left and right mastectomy Mammogram: Not on file        Cervical Cancer Screening Between the ages of 21-29, pap smear recommended once every 3 years  Between the ages of 33-67, can perform pap smear with HPV co-testing every 5 years     Recommendations may differ for women with a history of total hysterectomy, cervical cancer, or abnormal pap smears in past  Pap Smear: Not on file        Hepatitis C Screening Once for adults born between [facility]  More frequently in patients at high risk for Hepatitis C Hep C Antibody: Not on file        Diabetes Screening 1-2 times per year if you're at risk for diabetes or have pre-diabetes Fasting glucose: 118 mg/dL (5/23/2023)  A1C: 6 2 % (8/17/2022)      Cholesterol Screening Once every 5 years if you don't have a lipid disorder  May order more often based on risk factors  Lipid panel: 05/23/2023          Other Preventive Screenings Covered by Medicare:  1  Abdominal Aortic Aneurysm (AAA) Screening: covered once if your at risk  You're considered to be at risk if you have a family history of AAA  2  Lung Cancer Screening: covers low dose CT scan once per year if you meet all of the following conditions: (1) Age 50-69; (2) No signs or symptoms of lung cancer; (3) Current smoker or have quit smoking within the last 15 years; (4) You have a tobacco smoking history of at least 20 pack years (packs per day multiplied by number of years you smoked); (5) You get a written order from a healthcare provider  3  Glaucoma Screening: covered annually if you're considered high risk: (1) You have diabetes OR (2) Family history of glaucoma OR (3)  aged 48 and older OR (3)  American aged 72 and older  3  Osteoporosis Screening: covered every 2 years if you meet one of the following conditions: (1) You're estrogen deficient and at risk for osteoporosis based off medical history and other findings; (2) Have a vertebral abnormality; (3) On glucocorticoid therapy for more than 3 months; (4) Have primary hyperparathyroidism; (5) On osteoporosis medications and need to assess response to drug therapy  · Last bone density test (DXA Scan): Not on file  5  HIV Screening: covered annually if you're between the age of 12-76  Also covered annually if you are younger than 13 and older than 72 with risk factors for HIV infection  For pregnant patients, it is covered up to 3 times per pregnancy      Immunizations:  Immunization Recommendations   Influenza Vaccine Annual influenza vaccination during flu season is recommended for all persons aged >= 6 months who do not have contraindications   Pneumococcal Vaccine   * Pneumococcal conjugate vaccine = PCV13 (Prevnar 13), PCV15 (Vaxneuvance), PCV20 (Prevnar 20)  * Pneumococcal polysaccharide vaccine = PPSV23 (Pneumovax) Adults 25-60 years old: 1-3 doses may be recommended based on certain risk factors  Adults 72 years old: 1-2 doses may be recommended based off what pneumonia vaccine you previously received   Hepatitis B Vaccine 3 dose series if at intermediate or high risk (ex: diabetes, end stage renal disease, liver disease)   Tetanus (Td) Vaccine - COST NOT COVERED BY MEDICARE PART B Following completion of primary series, a booster dose should be given every 10 years to maintain immunity against tetanus  Td may also be given as tetanus wound prophylaxis  Tdap Vaccine - COST NOT COVERED BY MEDICARE PART B Recommended at least once for all adults  For pregnant patients, recommended with each pregnancy  Shingles Vaccine (Shingrix) - COST NOT COVERED BY MEDICARE PART B  2 shot series recommended in those aged 48 and above     Health Maintenance Due:  There are no preventive care reminders to display for this patient  Immunizations Due:  There are no preventive care reminders to display for this patient  Advance Directives   What are advance directives? Advance directives are legal documents that state your wishes and plans for medical care  These plans are made ahead of time in case you lose your ability to make decisions for yourself  Advance directives can apply to any medical decision, such as the treatments you want, and if you want to donate organs  What are the types of advance directives? There are many types of advance directives, and each state has rules about how to use them  You may choose a combination of any of the following:  · Living will: This is a written record of the treatment you want   You can also choose which treatments you do not want, which to limit, and which to stop at a certain time  This includes surgery, medicine, IV fluid, and tube feedings  · Durable power of  for healthcare [facility]): This is a written record that states who you want to make healthcare choices for you when you are unable to make them for yourself  This person, called a proxy, is usually a family member or a friend  You may choose more than 1 proxy  · Do not resuscitate (DNR) order:  A DNR order is used in case your heart stops beating or you stop breathing  It is a request not to have certain forms of treatment, such as CPR  A DNR order may be included in other types of advance directives  · Medical directive: This covers the care that you want if you are in a coma, near death, or unable to make decisions for yourself  You can list the treatments you want for each condition  Treatment may include pain medicine, surgery, blood transfusions, dialysis, IV or tube feedings, and a ventilator (breathing machine)  · Values history: This document has questions about your views, beliefs, and how you feel and think about life  This information can help others choose the care that you would choose  Why are advance directives important? An advance directive helps you control your care  Although spoken wishes may be used, it is better to have your wishes written down  Spoken wishes can be misunderstood, or not followed  Treatments may be given even if you do not want them  An advance directive may make it easier for your family to make difficult choices about your care  Weight Management   Why it is important to manage your weight:  Being overweight increases your risk of health conditions such as heart disease, high blood pressure, type 2 diabetes, and certain types of cancer  It can also increase your risk for osteoarthritis, sleep apnea, and other respiratory problems  Aim for a slow, steady weight loss   Even a small amount of weight loss can lower your risk of health problems  How to lose weight safely:  A safe and healthy way to lose weight is to eat fewer calories and get regular exercise  You can lose up about 1 pound a week by decreasing the number of calories you eat by 500 calories each day  Healthy meal plan for weight management:  A healthy meal plan includes a variety of foods, contains fewer calories, and helps you stay healthy  A healthy meal plan includes the following:  · Eat whole-grain foods more often  A healthy meal plan should contain fiber  Fiber is the part of grains, fruits, and vegetables that is not broken down by your body  Whole-grain foods are healthy and provide extra fiber in your diet  Some examples of whole-grain foods are whole-wheat breads and pastas, oatmeal, brown rice, and bulgur  · Eat a variety of vegetables every day  Include dark, leafy greens such as spinach, kale, scott greens, and mustard greens  Eat yellow and orange vegetables such as carrots, sweet potatoes, and winter squash  · Eat a variety of fruits every day  Choose fresh or canned fruit (canned in its own juice or light syrup) instead of juice  Fruit juice has very little or no fiber  · Eat low-fat dairy foods  Drink fat-free (skim) milk or 1% milk  Eat fat-free yogurt and low-fat cottage cheese  Try low-fat cheeses such as mozzarella and other reduced-fat cheeses  · Choose meat and other protein foods that are low in fat  Choose beans or other legumes such as split peas or lentils  Choose fish, skinless poultry (chicken or turkey), or lean cuts of red meat (beef or pork)  Before you cook meat or poultry, cut off any visible fat  · Use less fat and oil  Try baking foods instead of frying them  Add less fat, such as margarine, sour cream, regular salad dressing and mayonnaise to foods  Eat fewer high-fat foods  Some examples of high-fat foods include french fries, doughnuts, ice cream, and cakes  · Eat fewer sweets    Limit foods and drinks that are high in sugar  This includes candy, cookies, regular soda, and sweetened drinks  Exercise:  Exercise at least 30 minutes per day on most days of the week  Some examples of exercise include walking, biking, dancing, and swimming  You can also fit in more physical activity by taking the stairs instead of the elevator or parking farther away from stores  Ask your healthcare provider about the best exercise plan for you  © Copyright [employer] 2018 Information is for End User's use only and may not be sold, redistributed or otherwise used for commercial purposes   All illustrations and images included in CareNotes® are the copyrighted property of A D A M , Inc  or [address] [place] [address]

## 2023-06-09 ENCOUNTER — APPOINTMENT (OUTPATIENT)
Dept: LAB | Facility: CLINIC | Age: 83
End: 2023-06-09
Payer: COMMERCIAL

## 2023-06-09 DIAGNOSIS — E87.5 HYPERKALEMIA: ICD-10-CM

## 2023-06-09 PROCEDURE — 36415 COLL VENOUS BLD VENIPUNCTURE: CPT

## 2023-06-09 PROCEDURE — 80048 BASIC METABOLIC PNL TOTAL CA: CPT

## 2023-06-10 ENCOUNTER — DOCUMENTATION (OUTPATIENT)
Dept: FAMILY MEDICINE CLINIC | Facility: CLINIC | Age: 83
End: 2023-06-10

## 2023-06-10 ENCOUNTER — TELEPHONE (OUTPATIENT)
Dept: OTHER | Facility: OTHER | Age: 83
End: 2023-06-10

## 2023-06-10 LAB
ANION GAP SERPL CALCULATED.3IONS-SCNC: 3 MMOL/L (ref 4–13)
BUN SERPL-MCNC: 43 MG/DL (ref 5–25)
CALCIUM SERPL-MCNC: 10 MG/DL (ref 8.3–10.1)
CHLORIDE SERPL-SCNC: 112 MMOL/L (ref 96–108)
CO2 SERPL-SCNC: 23 MMOL/L (ref 21–32)
CREAT SERPL-MCNC: 1.68 MG/DL (ref 0.6–1.3)
GFR SERPL CREATININE-BSD FRML MDRD: 27 ML/MIN/1.73SQ M
GLUCOSE SERPL-MCNC: 81 MG/DL (ref 65–140)
POTASSIUM SERPL-SCNC: 6.3 MMOL/L (ref 3.5–5.3)
SODIUM SERPL-SCNC: 138 MMOL/L (ref 135–147)

## 2023-06-10 NOTE — TELEPHONE ENCOUNTER
Lab Result: Critical Lab Potassium 6 3   Date/Time Drawn: 6/10/23 @ 12:45 PM   Ordering Provider: Dr Lashawn Menezes Name: Portillo Cruz following critical/stat result was read back to the lab as stated above and [employer] to the on-call provider  The provider confirmed receipt of the message

## 2023-06-11 ENCOUNTER — DOCUMENTATION (OUTPATIENT)
Dept: FAMILY MEDICINE CLINIC | Facility: CLINIC | Age: 83
End: 2023-06-11

## 2023-06-11 NOTE — PROGRESS NOTES
Received notification from lab regarding elevated potassium  She is with a recent hx of elevated potassium, lab being drawn was a recheck  Number called in her chart - reached her daughter with whom she lives with  Explained the elevated lab results and need for further evaluation as the value was critical   Her daughter informed me that her mother was out of town - three hours away  Reviewed with her to make her mother aware of the elevated level and that it was recommended that she be further evaluated in the ED as there was no identifiable cause for the extreme elevation - she has been on the same medications, no changes in that and her daughter denied any illness  I discussed red flag symptoms for her to go to the ED where she was visiting  I also reached out to the PCP, discussed that I tried to reach her but spoke to her daughter and the current situation

## 2023-06-12 ENCOUNTER — APPOINTMENT (OUTPATIENT)
Dept: LAB | Facility: CLINIC | Age: 83
End: 2023-06-12
Payer: COMMERCIAL

## 2023-06-12 ENCOUNTER — APPOINTMENT (OUTPATIENT)
Dept: LAB | Facility: HOSPITAL | Age: 83
End: 2023-06-12
Payer: COMMERCIAL

## 2023-06-12 DIAGNOSIS — E87.5 HYPERKALEMIA: ICD-10-CM

## 2023-06-12 DIAGNOSIS — N18.4 CKD (CHRONIC KIDNEY DISEASE) STAGE 4, GFR 15-29 ML/MIN (HCC): Primary | ICD-10-CM

## 2023-06-12 LAB
ANION GAP SERPL CALCULATED.3IONS-SCNC: 7 MMOL/L (ref 4–13)
BUN SERPL-MCNC: 42 MG/DL (ref 5–25)
CALCIUM SERPL-MCNC: 9.5 MG/DL (ref 8.4–10.2)
CHLORIDE SERPL-SCNC: 108 MMOL/L (ref 96–108)
CO2 SERPL-SCNC: 23 MMOL/L (ref 21–32)
CREAT SERPL-MCNC: 1.74 MG/DL (ref 0.6–1.3)
GFR SERPL CREATININE-BSD FRML MDRD: 26 ML/MIN/1.73SQ M
GLUCOSE SERPL-MCNC: 127 MG/DL (ref 65–140)
POTASSIUM SERPL-SCNC: 5.1 MMOL/L (ref 3.5–5.3)
SODIUM SERPL-SCNC: 138 MMOL/L (ref 135–147)

## 2023-06-12 PROCEDURE — 36415 COLL VENOUS BLD VENIPUNCTURE: CPT

## 2023-06-12 PROCEDURE — 80048 BASIC METABOLIC PNL TOTAL CA: CPT

## 2023-06-22 ENCOUNTER — TELEPHONE (OUTPATIENT)
Dept: FAMILY MEDICINE CLINIC | Facility: CLINIC | Age: 83
End: 2023-06-22

## 2023-06-22 DIAGNOSIS — R60.0 LOCALIZED EDEMA: Primary | ICD-10-CM

## 2023-06-22 RX ORDER — HYDROCHLOROTHIAZIDE 25 MG/1
25 TABLET ORAL DAILY
Qty: 90 TABLET | Refills: 3 | Status: SHIPPED | OUTPATIENT
Start: 2023-06-22

## 2023-06-22 NOTE — TELEPHONE ENCOUNTER
They need a new order sent to the pharmacy  The daughter does not recall the name of the RX and stated you would know

## 2023-06-22 NOTE — TELEPHONE ENCOUNTER
Daughter called stating since stopping the water pill her left foot is extremely swollen, denies redness and pain  They are asking if she should restart the water pill?

## 2023-06-26 NOTE — TELEPHONE ENCOUNTER
Daughter notified  Pt is scheduled to see provider  Daughter and pt have questions that they would like to address with provider

## 2023-07-05 ENCOUNTER — RA CDI HCC (OUTPATIENT)
Dept: OTHER | Facility: HOSPITAL | Age: 83
End: 2023-07-05

## 2023-07-05 ENCOUNTER — OFFICE VISIT (OUTPATIENT)
Dept: FAMILY MEDICINE CLINIC | Facility: CLINIC | Age: 83
End: 2023-07-05
Payer: COMMERCIAL

## 2023-07-05 VITALS
OXYGEN SATURATION: 95 % | HEIGHT: 63 IN | TEMPERATURE: 98 F | WEIGHT: 165.8 LBS | SYSTOLIC BLOOD PRESSURE: 142 MMHG | HEART RATE: 76 BPM | BODY MASS INDEX: 29.38 KG/M2 | DIASTOLIC BLOOD PRESSURE: 67 MMHG

## 2023-07-05 DIAGNOSIS — N18.4 CHRONIC RENAL DISEASE, STAGE IV (HCC): ICD-10-CM

## 2023-07-05 DIAGNOSIS — F33.9 EPISODE OF RECURRENT MAJOR DEPRESSIVE DISORDER, UNSPECIFIED DEPRESSION EPISODE SEVERITY (HCC): ICD-10-CM

## 2023-07-05 DIAGNOSIS — I10 ESSENTIAL HYPERTENSION: Primary | ICD-10-CM

## 2023-07-05 DIAGNOSIS — E87.5 HYPERKALEMIA: ICD-10-CM

## 2023-07-05 PROCEDURE — 1160F RVW MEDS BY RX/DR IN RCRD: CPT | Performed by: FAMILY MEDICINE

## 2023-07-05 PROCEDURE — 3077F SYST BP >= 140 MM HG: CPT | Performed by: FAMILY MEDICINE

## 2023-07-05 PROCEDURE — 3078F DIAST BP <80 MM HG: CPT | Performed by: FAMILY MEDICINE

## 2023-07-05 PROCEDURE — 1159F MED LIST DOCD IN RCRD: CPT | Performed by: FAMILY MEDICINE

## 2023-07-05 PROCEDURE — 3725F SCREEN DEPRESSION PERFORMED: CPT | Performed by: FAMILY MEDICINE

## 2023-07-05 PROCEDURE — 99214 OFFICE O/P EST MOD 30 MIN: CPT | Performed by: FAMILY MEDICINE

## 2023-07-05 PROCEDURE — 1100F PTFALLS ASSESS-DOCD GE2>/YR: CPT | Performed by: FAMILY MEDICINE

## 2023-07-05 PROCEDURE — 3288F FALL RISK ASSESSMENT DOCD: CPT | Performed by: FAMILY MEDICINE

## 2023-07-05 NOTE — ASSESSMENT & PLAN NOTE
Lab Results   Component Value Date    EGFR 26 06/12/2023    EGFR 27 06/09/2023    EGFR 30 05/25/2023    CREATININE 1.74 (H) 06/12/2023    CREATININE 1.68 (H) 06/09/2023    CREATININE 1.53 (H) 05/25/2023   ref to nephrology

## 2023-07-05 NOTE — PROGRESS NOTES
[address] [facility] coding opportunities       Chart reviewed, no opportunity found: [address]        Patients Insurance     Medicare Insurance: [employer]

## 2023-07-05 NOTE — PROGRESS NOTES
Assessment/Plan:       1. Essential hypertension  Assessment & Plan:  Continue meds, no changes      2. Hyperkalemia  Assessment & Plan:  Better off triamterine  Ref to nephrology    Orders:  -     Basic metabolic panel; Future    3. Chronic renal disease, stage IV [facility])  Assessment & Plan:  Lab Results   Component Value Date    EGFR 26 06/12/2023    EGFR 27 06/09/2023    EGFR 30 05/25/2023    CREATININE 1.74 (H) 06/12/2023    CREATININE 1.68 (H) 06/09/2023    CREATININE 1.53 (H) 05/25/2023   ref to nephrology      4. Episode of recurrent major depressive disorder, unspecified depression episode severity ([address])        Subjective:      Patient ID: Odin Zheng is a 78 y.o. female. The patient is here to f/u on htn/ckd/hyperkalemia. Stable now off triamterene. Will f/u with nephrology. No CP. Mild sob due to deconditioning. Sleep is ok. She is sleeping ok. She is moving her bowels/urinating ok. The following portions of the patient's history were reviewed and updated as appropriate: allergies, current medications, past family history, past medical history, past social history, past surgical history, and problem list.    Review of Systems   Respiratory: Negative. Cardiovascular: Negative. Gastrointestinal: Negative. Objective:      /67 (BP Location: Left arm, Patient Position: Sitting, Cuff Size: Adult)   Pulse 76   Temp 98 °F (36.7 °C)   Ht 5' 3" (1.6 m)   Wt 75.2 kg (165 lb 12.8 oz)   SpO2 95%   BMI 29.37 kg/m²          Physical Exam  Vitals and nursing note reviewed. Constitutional:       Appearance: Normal appearance. HENT:      Head: Normocephalic and atraumatic. Nose: Nose normal.      Mouth/Throat:      Mouth: Mucous membranes are moist.   Eyes:      Extraocular Movements: Extraocular movements intact. Pupils: Pupils are equal, round, and reactive to light. Cardiovascular:      Rate and Rhythm: Normal rate and regular rhythm. Pulses: Normal pulses. Pulmonary:      Effort: Pulmonary effort is normal.      Breath sounds: Normal breath sounds. Abdominal:      General: Bowel sounds are normal.      Palpations: Abdomen is soft. Musculoskeletal:      Cervical back: Normal range of motion. Skin:     General: Skin is warm and dry. Capillary Refill: Capillary refill takes less than 2 seconds. Neurological:      General: No focal deficit present. Mental Status: She is alert.    Psychiatric:         Mood and Affect: Mood normal.

## 2023-07-28 ENCOUNTER — LAB (OUTPATIENT)
Dept: LAB | Facility: CLINIC | Age: 83
End: 2023-07-28
Payer: COMMERCIAL

## 2023-07-28 DIAGNOSIS — E87.5 HYPERKALEMIA: ICD-10-CM

## 2023-07-28 LAB
ANION GAP SERPL CALCULATED.3IONS-SCNC: 7 MMOL/L
BUN SERPL-MCNC: 32 MG/DL (ref 5–25)
CALCIUM SERPL-MCNC: 9.8 MG/DL (ref 8.3–10.1)
CHLORIDE SERPL-SCNC: 107 MMOL/L (ref 96–108)
CO2 SERPL-SCNC: 27 MMOL/L (ref 21–32)
CREAT SERPL-MCNC: 1.57 MG/DL (ref 0.6–1.3)
GFR SERPL CREATININE-BSD FRML MDRD: 29 ML/MIN/1.73SQ M
GLUCOSE SERPL-MCNC: 109 MG/DL (ref 65–140)
POTASSIUM SERPL-SCNC: 4.2 MMOL/L (ref 3.5–5.3)
SODIUM SERPL-SCNC: 141 MMOL/L (ref 135–147)

## 2023-07-28 PROCEDURE — 36415 COLL VENOUS BLD VENIPUNCTURE: CPT

## 2023-07-28 PROCEDURE — 80048 BASIC METABOLIC PNL TOTAL CA: CPT

## 2023-07-30 PROBLEM — Z00.00 MEDICARE ANNUAL WELLNESS VISIT, SUBSEQUENT: Status: RESOLVED | Noted: 2023-05-31 | Resolved: 2023-07-30

## 2023-08-03 ENCOUNTER — TELEPHONE (OUTPATIENT)
Dept: NEPHROLOGY | Facility: CLINIC | Age: 83
End: 2023-08-03

## 2023-08-03 NOTE — TELEPHONE ENCOUNTER
Called to confirm patients appointment with daughter, and she informed me that the patient had a stroke and they will call back to reschedule 08/03/23

## 2023-08-14 ENCOUNTER — TRANSITIONAL CARE MANAGEMENT (OUTPATIENT)
Dept: FAMILY MEDICINE CLINIC | Facility: CLINIC | Age: 83
End: 2023-08-14

## 2023-08-22 ENCOUNTER — OFFICE VISIT (OUTPATIENT)
Dept: FAMILY MEDICINE CLINIC | Facility: CLINIC | Age: 83
End: 2023-08-22
Payer: COMMERCIAL

## 2023-08-22 VITALS
TEMPERATURE: 97.8 F | DIASTOLIC BLOOD PRESSURE: 77 MMHG | HEART RATE: 78 BPM | SYSTOLIC BLOOD PRESSURE: 133 MMHG | BODY MASS INDEX: 29.13 KG/M2 | OXYGEN SATURATION: 97 % | HEIGHT: 63 IN | WEIGHT: 164.4 LBS

## 2023-08-22 DIAGNOSIS — I48.91 NEW ONSET A-FIB (HCC): Primary | ICD-10-CM

## 2023-08-22 PROCEDURE — 99214 OFFICE O/P EST MOD 30 MIN: CPT | Performed by: FAMILY MEDICINE

## 2023-08-22 RX ORDER — SENNOSIDES A AND B 8.6 MG/1
2 TABLET, FILM COATED ORAL
COMMUNITY
Start: 2023-08-14 | End: 2023-09-13

## 2023-08-22 RX ORDER — ATORVASTATIN CALCIUM 80 MG/1
TABLET, FILM COATED ORAL
COMMUNITY
Start: 2023-08-14 | End: 2023-08-22 | Stop reason: SDUPTHER

## 2023-08-22 RX ORDER — ASPIRIN 81 MG/1
81 TABLET ORAL DAILY
COMMUNITY
Start: 2023-08-15 | End: 2024-08-14

## 2023-08-22 RX ORDER — APIXABAN 5 MG/1
5 TABLET, FILM COATED ORAL 2 TIMES DAILY
Qty: 60 TABLET | Refills: 3 | Status: SHIPPED | OUTPATIENT
Start: 2023-08-22

## 2023-08-22 RX ORDER — ATORVASTATIN CALCIUM 80 MG/1
80 TABLET, FILM COATED ORAL DAILY
Qty: 90 TABLET | Refills: 3 | Status: SHIPPED | OUTPATIENT
Start: 2023-08-22

## 2023-08-22 RX ORDER — APIXABAN 5 MG/1
TABLET, FILM COATED ORAL
COMMUNITY
Start: 2023-08-14 | End: 2023-08-22 | Stop reason: SDUPTHER

## 2023-08-22 NOTE — PROGRESS NOTES
Assessment/Plan:       1. New onset a-fib [facility])  -     Ambulatory Referral to Cardiology; Future          Subjective:      Patient ID: Maria D Rooney is a 78 y.o. female. HPI    The following portions of the patient's history were reviewed and updated as appropriate: allergies, current medications, past family history, past medical history, past social history, past surgical history, and problem list.    Review of Systems   Respiratory: Negative. Cardiovascular: Negative. Gastrointestinal: Negative. Objective:      /77 (BP Location: Left arm, Patient Position: Sitting, Cuff Size: Standard)   Pulse 78   Temp 97.8 °F (36.6 °C)   Ht 5' 3" (1.6 m)   Wt 74.6 kg (164 lb 6.4 oz)   SpO2 97%   BMI 29.12 kg/m²          Physical Exam  Vitals and nursing note reviewed. Constitutional:       Appearance: Normal appearance. HENT:      Head: Normocephalic and atraumatic. Nose: Nose normal.      Mouth/Throat:      Mouth: Mucous membranes are moist.   Eyes:      Extraocular Movements: Extraocular movements intact. Pupils: Pupils are equal, round, and reactive to light. Cardiovascular:      Rate and Rhythm: Normal rate and regular rhythm. Pulses: Normal pulses. Pulmonary:      Effort: Pulmonary effort is normal.      Breath sounds: Normal breath sounds. Abdominal:      General: Bowel sounds are normal.      Palpations: Abdomen is soft. Musculoskeletal:      Cervical back: Normal range of motion. Skin:     General: Skin is warm and dry. Capillary Refill: Capillary refill takes less than 2 seconds. Neurological:      General: No focal deficit present. Mental Status: She is alert.    Psychiatric:         Mood and Affect: Mood normal.

## 2023-09-07 ENCOUNTER — TELEPHONE (OUTPATIENT)
Dept: FAMILY MEDICINE CLINIC | Facility: CLINIC | Age: 83
End: 2023-09-07

## 2023-09-07 NOTE — TELEPHONE ENCOUNTER
Nanda Lou from [facility] visiting nurses called to update us on the patient and that she will be discharged from them now.

## 2023-09-11 DIAGNOSIS — R60.0 LOCALIZED EDEMA: ICD-10-CM

## 2023-09-11 DIAGNOSIS — I48.91 NEW ONSET A-FIB (HCC): ICD-10-CM

## 2023-09-11 RX ORDER — ASPIRIN 81 MG/1
81 TABLET ORAL DAILY
Qty: 30 TABLET | Refills: 11 | Status: SHIPPED | OUTPATIENT
Start: 2023-09-11 | End: 2024-09-10

## 2023-09-11 RX ORDER — ATORVASTATIN CALCIUM 80 MG/1
80 TABLET, FILM COATED ORAL DAILY
Qty: 90 TABLET | Refills: 3 | Status: SHIPPED | OUTPATIENT
Start: 2023-09-11

## 2023-09-11 RX ORDER — HYDROCHLOROTHIAZIDE 25 MG/1
25 TABLET ORAL DAILY
Qty: 90 TABLET | Refills: 3 | Status: SHIPPED | OUTPATIENT
Start: 2023-09-11 | End: 2023-09-19 | Stop reason: SDUPTHER

## 2023-09-19 DIAGNOSIS — R60.0 LOCALIZED EDEMA: ICD-10-CM

## 2023-09-20 RX ORDER — HYDROCHLOROTHIAZIDE 25 MG/1
25 TABLET ORAL DAILY
Qty: 90 TABLET | Refills: 0 | Status: SHIPPED | OUTPATIENT
Start: 2023-09-20

## 2023-09-21 ENCOUNTER — TELEPHONE (OUTPATIENT)
Dept: CARDIOLOGY CLINIC | Facility: CLINIC | Age: 83
End: 2023-09-21

## 2023-09-28 DIAGNOSIS — I48.91 NEW ONSET A-FIB (HCC): ICD-10-CM

## 2023-09-29 RX ORDER — ATORVASTATIN CALCIUM 80 MG/1
80 TABLET, FILM COATED ORAL DAILY
Qty: 90 TABLET | Refills: 0 | Status: SHIPPED | OUTPATIENT
Start: 2023-09-29 | End: 2023-10-01

## 2023-09-30 DIAGNOSIS — I48.91 NEW ONSET A-FIB (HCC): ICD-10-CM

## 2023-10-01 DIAGNOSIS — K59.09 OTHER CONSTIPATION: Primary | ICD-10-CM

## 2023-10-01 RX ORDER — SENNOSIDES A AND B 8.6 MG/1
2 TABLET, FILM COATED ORAL 2 TIMES DAILY
Qty: 120 TABLET | Refills: 0 | Status: SHIPPED | OUTPATIENT
Start: 2023-10-01 | End: 2023-10-31

## 2023-10-01 RX ORDER — MULTIVIT-MIN/IRON/FOLIC ACID/K 18-600-40
1 CAPSULE ORAL DAILY
Qty: 30 TABLET | Refills: 0 | Status: SHIPPED | OUTPATIENT
Start: 2023-10-01

## 2023-10-23 DIAGNOSIS — I48.91 NEW ONSET A-FIB (HCC): ICD-10-CM

## 2023-10-23 RX ORDER — MULTIVIT-MIN/IRON/FOLIC ACID/K 18-600-40
1 CAPSULE ORAL DAILY
Qty: 30 TABLET | Refills: 0 | Status: SHIPPED | OUTPATIENT
Start: 2023-10-23

## 2023-11-16 DIAGNOSIS — K59.09 OTHER CONSTIPATION: ICD-10-CM

## 2023-11-16 RX ORDER — SENNOSIDES 8.6 MG
17.2 TABLET ORAL 2 TIMES DAILY
Qty: 120 TABLET | Refills: 0 | Status: SHIPPED | OUTPATIENT
Start: 2023-11-16

## 2023-11-17 DIAGNOSIS — I63.10 CEREBRAL INFARCTION DUE TO EMBOLISM OF UNSPECIFIED PRECEREBRAL ARTERY (HCC): ICD-10-CM

## 2023-11-17 DIAGNOSIS — I48.0 PAROXYSMAL ATRIAL FIBRILLATION (HCC): ICD-10-CM

## 2023-11-21 ENCOUNTER — TELEPHONE (OUTPATIENT)
Dept: ADMINISTRATIVE | Facility: OTHER | Age: 83
End: 2023-11-21

## 2023-11-21 NOTE — TELEPHONE ENCOUNTER
----- Message from Eliu Pike sent at 11/21/2023 11:33 AM EST -----  Regarding: Care Gap request  11/21/23 11:33 AM    Hello, our patient attached above has had DEXA Scan completed/performed. Please assist in updating the patient chart by pulling the Care Everywhere (CE) document. The date of service is 2012.      Thank you,  Amanda PERRY [facility]

## 2023-11-21 NOTE — TELEPHONE ENCOUNTER
Upon review of the In Basket request we were able to locate, review, and update the patient chart as requested for DEXA Scan. Any additional questions or concerns should be emailed to the Practice Liaisons via the appropriate education email address, please do not reply via In Basket.     Thank you  Linette Li

## 2023-11-22 ENCOUNTER — APPOINTMENT (OUTPATIENT)
Dept: LAB | Facility: CLINIC | Age: 83
End: 2023-11-22
Payer: COMMERCIAL

## 2023-11-22 DIAGNOSIS — I48.0 PAROXYSMAL ATRIAL FIBRILLATION (HCC): ICD-10-CM

## 2023-11-22 DIAGNOSIS — I63.10 CEREBRAL INFARCTION DUE TO EMBOLISM OF PRECEREBRAL ARTERY (HCC): ICD-10-CM

## 2023-11-22 LAB
ANION GAP SERPL CALCULATED.3IONS-SCNC: 10 MMOL/L
BASOPHILS # BLD AUTO: 0.07 THOUSANDS/ÂΜL (ref 0–0.1)
BASOPHILS NFR BLD AUTO: 1 % (ref 0–1)
BUN SERPL-MCNC: 38 MG/DL (ref 5–25)
CALCIUM SERPL-MCNC: 10.2 MG/DL (ref 8.4–10.2)
CHLORIDE SERPL-SCNC: 105 MMOL/L (ref 96–108)
CO2 SERPL-SCNC: 27 MMOL/L (ref 21–32)
CREAT SERPL-MCNC: 1.34 MG/DL (ref 0.6–1.3)
EOSINOPHIL # BLD AUTO: 0.21 THOUSAND/ÂΜL (ref 0–0.61)
EOSINOPHIL NFR BLD AUTO: 3 % (ref 0–6)
ERYTHROCYTE [DISTWIDTH] IN BLOOD BY AUTOMATED COUNT: 14.6 % (ref 11.6–15.1)
GFR SERPL CREATININE-BSD FRML MDRD: 36 ML/MIN/1.73SQ M
GLUCOSE P FAST SERPL-MCNC: 116 MG/DL (ref 65–99)
HCT VFR BLD AUTO: 39.5 % (ref 34.8–46.1)
HGB BLD-MCNC: 12.2 G/DL (ref 11.5–15.4)
IMM GRANULOCYTES # BLD AUTO: 0.05 THOUSAND/UL (ref 0–0.2)
IMM GRANULOCYTES NFR BLD AUTO: 1 % (ref 0–2)
LYMPHOCYTES # BLD AUTO: 2.07 THOUSANDS/ÂΜL (ref 0.6–4.47)
LYMPHOCYTES NFR BLD AUTO: 26 % (ref 14–44)
MCH RBC QN AUTO: 29.5 PG (ref 26.8–34.3)
MCHC RBC AUTO-ENTMCNC: 30.9 G/DL (ref 31.4–37.4)
MCV RBC AUTO: 95 FL (ref 82–98)
MONOCYTES # BLD AUTO: 0.81 THOUSAND/ÂΜL (ref 0.17–1.22)
MONOCYTES NFR BLD AUTO: 10 % (ref 4–12)
NEUTROPHILS # BLD AUTO: 4.81 THOUSANDS/ÂΜL (ref 1.85–7.62)
NEUTS SEG NFR BLD AUTO: 59 % (ref 43–75)
NRBC BLD AUTO-RTO: 0 /100 WBCS
PLATELET # BLD AUTO: 284 THOUSANDS/UL (ref 149–390)
PMV BLD AUTO: 11.2 FL (ref 8.9–12.7)
POTASSIUM SERPL-SCNC: 4.5 MMOL/L (ref 3.5–5.3)
RBC # BLD AUTO: 4.14 MILLION/UL (ref 3.81–5.12)
SODIUM SERPL-SCNC: 142 MMOL/L (ref 135–147)
TSH SERPL DL<=0.05 MIU/L-ACNC: 2.24 UIU/ML (ref 0.45–4.5)
WBC # BLD AUTO: 8.02 THOUSAND/UL (ref 4.31–10.16)

## 2023-11-22 PROCEDURE — 84443 ASSAY THYROID STIM HORMONE: CPT

## 2023-11-22 PROCEDURE — 83695 ASSAY OF LIPOPROTEIN(A): CPT

## 2023-11-22 PROCEDURE — 80048 BASIC METABOLIC PNL TOTAL CA: CPT

## 2023-11-22 PROCEDURE — 36415 COLL VENOUS BLD VENIPUNCTURE: CPT

## 2023-11-22 PROCEDURE — 85025 COMPLETE CBC W/AUTO DIFF WBC: CPT

## 2023-11-24 LAB — LPA SERPL-SCNC: 19.9 NMOL/L

## 2023-11-27 ENCOUNTER — TELEPHONE (OUTPATIENT)
Dept: NON INVASIVE DIAGNOSTICS | Facility: HOSPITAL | Age: 83
End: 2023-11-27

## 2023-11-27 NOTE — TELEPHONE ENCOUNTER
Spoke with patient's daughter, Malachi Santiago. Reviewed the following instructions with her:  1). NPO after midnight Tues 11/28. Please have her take her morning dose of Eliquis with a sip of water prior to arriving to the hospital  2). Arrival time to the hospital is 0730. Reviewed check in process with her and reminded them that she will need a  with her because she can not drive after receiving anesthesia (daughter told me the patient doesn't drive at all). Patient's daughter stated verbal understanding of these instructions.

## 2023-11-28 ENCOUNTER — ANESTHESIA EVENT (OUTPATIENT)
Dept: NON INVASIVE DIAGNOSTICS | Facility: HOSPITAL | Age: 83
End: 2023-11-28

## 2023-11-28 ENCOUNTER — RA CDI HCC (OUTPATIENT)
Dept: OTHER | Facility: HOSPITAL | Age: 83
End: 2023-11-28

## 2023-11-28 NOTE — PROGRESS NOTES
[address] coding opportunities       Chart reviewed, no opportunity found: [address] Review     Patients Insurance     Medicare Insurance: [employer]

## 2023-11-28 NOTE — ANESTHESIA PREPROCEDURE EVALUATION
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      Anesthesia Plan  ASA Score- 3     Anesthesia Type- IV sedation with anesthesia with ASA Monitors. Additional Monitors:     Airway Plan:            Plan Factors-Exercise tolerance (METS): >4 METS. Chart reviewed. EKG reviewed. Imaging results reviewed. Existing labs reviewed. Patient summary reviewed. Patient is not a current smoker. Patient not instructed to abstain from smoking on day of procedure. Patient did not smoke on day of surgery. Induction- intravenous. Postoperative Plan-     Informed Consent- Anesthetic plan and risks discussed with patient. I personally reviewed this patient with the CRNA. Discussed and agreed on the Anesthesia Plan with the CRNA. Guanakito Thao

## 2023-11-29 ENCOUNTER — HOSPITAL ENCOUNTER (OUTPATIENT)
Dept: NON INVASIVE DIAGNOSTICS | Facility: HOSPITAL | Age: 83
Discharge: HOME | End: 2023-11-29
Attending: INTERNAL MEDICINE

## 2023-11-29 ENCOUNTER — HOSPITAL ENCOUNTER (OUTPATIENT)
Dept: NON INVASIVE DIAGNOSTICS | Facility: HOSPITAL | Age: 83
Discharge: HOME | End: 2023-11-29
Attending: INTERNAL MEDICINE
Payer: COMMERCIAL

## 2023-11-29 ENCOUNTER — ANESTHESIA (OUTPATIENT)
Dept: NON INVASIVE DIAGNOSTICS | Facility: HOSPITAL | Age: 83
End: 2023-11-29

## 2023-11-29 ENCOUNTER — PREP FOR PROCEDURE (OUTPATIENT)
Dept: NON INVASIVE DIAGNOSTICS | Facility: HOSPITAL | Age: 83
End: 2023-11-29

## 2023-11-29 ENCOUNTER — HOSPITAL ENCOUNTER (OUTPATIENT)
Dept: NON INVASIVE DIAGNOSTICS | Facility: HOSPITAL | Age: 83
Discharge: HOME | End: 2023-11-29
Payer: COMMERCIAL

## 2023-11-29 VITALS
DIASTOLIC BLOOD PRESSURE: 81 MMHG | HEART RATE: 69 BPM | BODY MASS INDEX: 29.06 KG/M2 | HEIGHT: 63 IN | SYSTOLIC BLOOD PRESSURE: 166 MMHG | WEIGHT: 164 LBS

## 2023-11-29 VITALS
RESPIRATION RATE: 18 BRPM | SYSTOLIC BLOOD PRESSURE: 166 MMHG | OXYGEN SATURATION: 97 % | HEART RATE: 70 BPM | TEMPERATURE: 98 F | BODY MASS INDEX: 29.06 KG/M2 | HEIGHT: 63 IN | DIASTOLIC BLOOD PRESSURE: 81 MMHG | WEIGHT: 164 LBS

## 2023-11-29 DIAGNOSIS — I48.91 ATRIAL FIBRILLATION (HCC): ICD-10-CM

## 2023-11-29 DIAGNOSIS — I63.10 CEREBRAL INFARCTION DUE TO EMBOLISM OF UNSPECIFIED PRECEREBRAL ARTERY (HCC): ICD-10-CM

## 2023-11-29 DIAGNOSIS — I48.0 PAROXYSMAL ATRIAL FIBRILLATION (HCC): ICD-10-CM

## 2023-11-29 LAB
AORTIC ROOT: 3 CM
APICAL FOUR CHAMBER EJECTION FRACTION: 69 %
ATRIAL RATE: 50 BPM
DOP CALC MV VTI: 51.72 CM
E WAVE DECELERATION TIME: 312 MS
E/A RATIO: 3.3
FRACTIONAL SHORTENING: 35 (ref 28–44)
INTERVENTRICULAR SEPTUM IN DIASTOLE (PARASTERNAL SHORT AXIS VIEW): 1.8 CM
INTERVENTRICULAR SEPTUM: 2 CM (ref 0.6–1.1)
LAAS-AP2: 21.9 CM2
LAAS-AP4: 28.3 CM2
LEFT ATRIUM SIZE: 4.9 CM
LEFT ATRIUM VOLUME (MOD BIPLANE): 81 ML
LEFT ATRIUM VOLUME INDEX (MOD BIPLANE): 45.5 ML/M2
LEFT INTERNAL DIMENSION IN SYSTOLE: 2 CM (ref 2.1–4)
LEFT VENTRICULAR INTERNAL DIMENSION IN DIASTOLE: 3.1 CM (ref 3.5–6)
LEFT VENTRICULAR POSTERIOR WALL IN END DIASTOLE: 1.4 CM
LEFT VENTRICULAR STROKE VOLUME: 25 ML
LVSV (TEICH): 25 ML
MV E'TISSUE VEL-SEP: 10 CM/S
MV MEAN GRADIENT: 4 MMHG
MV PEAK A VEL: 0.44 M/S
MV PEAK E VEL: 145 CM/S
MV PEAK GRADIENT: 10 MMHG
MV STENOSIS PRESSURE HALF TIME: 90 MS
MV VALVE AREA P 1/2 METHOD: 2.44
QRS AXIS: 41 DEGREES
QRSD INTERVAL: 82 MS
QT INTERVAL: 446 MS
QTC INTERVAL: 448 MS
RA PRESSURE ESTIMATED: 3 MMHG
RIGHT ATRIUM AREA SYSTOLE A4C: 15 CM2
RIGHT VENTRICLE ID DIMENSION: 3.1 CM
RV PSP: 35 MMHG
SL CV LEFT ATRIUM LENGTH A2C: 6.2 CM
SL CV LV EF: 70
SL CV PED ECHO LEFT VENTRICLE DIASTOLIC VOLUME (MOD BIPLANE) 2D: 38 ML
SL CV PED ECHO LEFT VENTRICLE SYSTOLIC VOLUME (MOD BIPLANE) 2D: 13 ML
T WAVE AXIS: 49 DEGREES
TR MAX PG: 32 MMHG
TR PEAK VELOCITY: 2.8 M/S
TRICUSPID ANNULAR PLANE SYSTOLIC EXCURSION: 2.1 CM
TRICUSPID VALVE PEAK REGURGITATION VELOCITY: 2.84 M/S
VENTRICULAR RATE: 61 BPM

## 2023-11-29 PROCEDURE — 93306 TTE W/DOPPLER COMPLETE: CPT

## 2023-11-29 PROCEDURE — 93312 ECHO TRANSESOPHAGEAL: CPT

## 2023-11-29 PROCEDURE — 93005 ELECTROCARDIOGRAM TRACING: CPT

## 2023-11-29 RX ORDER — PROPOFOL 10 MG/ML
INJECTION, EMULSION INTRAVENOUS AS NEEDED
Status: DISCONTINUED | OUTPATIENT
Start: 2023-11-29 | End: 2023-11-29

## 2023-11-29 RX ORDER — SODIUM CHLORIDE, SODIUM LACTATE, POTASSIUM CHLORIDE, CALCIUM CHLORIDE 600; 310; 30; 20 MG/100ML; MG/100ML; MG/100ML; MG/100ML
INJECTION, SOLUTION INTRAVENOUS CONTINUOUS PRN
Status: DISCONTINUED | OUTPATIENT
Start: 2023-11-29 | End: 2023-11-29

## 2023-11-29 RX ORDER — LIDOCAINE HYDROCHLORIDE 20 MG/ML
INJECTION, SOLUTION EPIDURAL; INFILTRATION; INTRACAUDAL; PERINEURAL AS NEEDED
Status: DISCONTINUED | OUTPATIENT
Start: 2023-11-29 | End: 2023-11-29

## 2023-11-29 RX ADMIN — LIDOCAINE HYDROCHLORIDE 100 MG: 20 INJECTION, SOLUTION EPIDURAL; INFILTRATION; INTRACAUDAL; PERINEURAL at 08:58

## 2023-11-29 RX ADMIN — PROPOFOL 10 MG: 10 INJECTION, EMULSION INTRAVENOUS at 09:00

## 2023-11-29 RX ADMIN — PROPOFOL 10 MG: 10 INJECTION, EMULSION INTRAVENOUS at 09:02

## 2023-11-29 RX ADMIN — PROPOFOL 20 MG: 10 INJECTION, EMULSION INTRAVENOUS at 09:01

## 2023-11-29 RX ADMIN — SODIUM CHLORIDE, SODIUM LACTATE, POTASSIUM CHLORIDE, AND CALCIUM CHLORIDE: .6; .31; .03; .02 INJECTION, SOLUTION INTRAVENOUS at 08:55

## 2023-11-29 RX ADMIN — PROPOFOL 100 MG: 10 INJECTION, EMULSION INTRAVENOUS at 08:58

## 2023-11-29 NOTE — ANESTHESIA POSTPROCEDURE EVALUATION
Post-Op Assessment Note    CV Status:  Stable    Pain management: adequate       Mental Status:  Alert and awake   Hydration Status:  Stable   PONV Controlled:  None   Airway Patency:  Patent  Two or more mitigation strategies used for obstructive sleep apnea   Post Op Vitals Reviewed: Yes    Anethesia notable event occurred.     Staff: CRNA, Anesthesiologist               /58 (11/29/23 0914)    Temp   99   Pulse 76 (11/29/23 0914)   Resp 22 (11/29/23 0914)    SpO2 94 % (11/29/23 0914)

## 2023-11-29 NOTE — H&P
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 Upon interviewing today, patient was accompanied by her daughter who stated that her mother feels tired all the time, has limited activity within the house only. She denies any chest pain, dyspnea, orthopnea, PND or pedal edema. Denies any palpitations or syncope. She is unaware of any previous episodes of atrial fibrillation.     Past Medical History:  Diagnosis Date  Chronic maxillary sinusitis  Cutaneous candidiasis 4/25/2000  Generalized osteoarthritis  HELICOBACTER PYLORI (H. 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 2008, 10/2013  UPPER GI ENDOSCOPY/EXAM    Family History  Problem Relation Age of Onset  Cancer Mother  ovarian cnacer  Heart Disorder Mother  premature    Social History    Tobacco Use  Smoking status: Never  Smokeless tobacco: Never  Vaping Use  Vaping Use: Never used  Substance Use Topics  Alcohol use: No  Comment: occasional  Drug use: No    Review of patient's allergies indicates: Allergen Reactions  Statins  myalgias  Amoxicillin Rash  Doubtful  Rash and peeling skin  Levofloxacin  Joint pains  Sulfa Antibiotics  doubtful    Current Outpatient Medications  Medication Sig Dispense Refill  Omeprazole Magnesium (PRILOSEC OTC) 20 MG TBEC Take 2 Tablets by mouth in the morning. latanoprost (XALATAN) 0.005 % ophthalmic solution daily  Acetaminophen ER (TYLENOL 8 HOUR ARTHRITIS PAIN) 650 MG TBCR Take 1 Tablet by mouth every 8 hours as needed for Fever. Fluticasone Propionate 50 MCG/ACT Nasal Suspension Administer into each nostril 2 Sprays in the morning. Administer 2 Sprays into each nostril daily. . 3 Each 5  Venlafaxine HCl 37.5 MG Oral Tablet (Effexor) Take by mouth 1 Tablet in the morning. 90 Tablet 3  Nadolol 20 MG Oral Tablet (Corgard) Take by mouth 1 Tablet in the morning. Changed at [facility]. 90 Tablet 3  Atorvastatin Calcium 20 MG Oral Tablet (Lipitor) Take 1 tablet by mouth once daily (Patient taking differently: Take 4 Tablets by mouth in the morning.) 90 Tablet 3  Enalapril Maleate 10 MG Oral Tablet (Vasotec) Take by mouth 1 Tablet in the morning AND 1 Tablet before bedtime. (Patient taking differently: Take 1 Tablet by mouth in the morning.) 180 Tablet 3  Eliquis 5 MG Oral Tablet Take 1 Tablet by mouth in the morning and 1 Tablet before bedtime. EQ Aspirin Adult Low Dose 81 MG Oral Tablet Delayed Release Take 1 Tablet by mouth in the morning. Vitamin D3 25 MCG (1000 UT) Oral Tablet (Vitamin D3) Take 1 Tablet by mouth in the morning. Senna 8.6 MG Oral Tablet Take 2 Tablets by mouth in the morning and 2 Tablets before bedtime. hydroCHLOROthiazide 25 MG Oral Tablet (Hydrodiuril) Take 1 Tablet by mouth in the morning. Nystatin 060637 UNIT/GM External Cream Apply topically to affected area 2 times a day. To affacted area for two weeks.  15 g 2  Nystatin 458857 UNIT/GM External Powder (Nystop) Apply topically to affected area 3 times a day. Apply under breasts 15 g 0    No current facility-administered medications for this visit. ROS:  Constitutional: (-) fever chills sweats or weight loss  Cardiovascular: (-) negative: no chest pain, dyspnea, syncope, or palpitations  Pulmonary: (-) negative: no cough, wheezing, or shortness of breath    PHYSICAL EXAMINATION  /68  Pulse 86  Temp 36.3 °C (97.3 °F) (Infrared )  Resp 18  Ht 1.613 m (5' 3.5")  Wt 73.9 kg (163 lb)  LMP (LMP Unknown)  SpO2 96%  BMI 28.42 kg/m²  BSA 1.82 m²  Body mass index is 28.42 kg/m². Constitutional: no acute distress  Eyes: sclera and conjunctiva normal  Neck: supple, normal range of motion  CV: Irregularly irregular, no murmur, gallops or rub  Chest: normal respiratory effort, lungs clear to auscultation and percussion  Abdomen: normal: soft, bowel sounds normal, no masses, tenderness or organomegaly  Extremities: no clubbing, cyanosis, or edema, otherwise grossly normal, warm, and dry    Laboratory Data Review:  Latest Reference Range & Units 22 09:27  Triglycerides <=174 mg/dL 173  Cholesterol <200 mg/dL 143  Non-HDL Cholesterol <=159 mg/dL 103  HDL Cholesterol >49 mg/dL 40 (L)  LDL Cholesterol <=129 mg/dL 68  (L): Data is abnormally low    [id number]    Atrial fibrillation, ventricular rate 60 beats per minute    Impression:    Paroxysmal atrial fibrillation, QHE4KI2-PQGf = 6  CVA , left occipital, 2023  Hypertension  Dyslipidemia    Plan: Will try to restore normal sinus rhythm, ANA-cardioversion in the upcoming 2 weeks. Continue Eliquis 5 mg p.o. twice daily  Blood pressure well controlled continue 10 mg twice daily. Has been on nadolol for many many years, will continue. Continue Lipitor. Check thyroid function test.  Post cardioversion, will obtain a ZIO Patch to assess for AFib burden. Recommended sleep study to assess for obstructive sleep apnea, patient declined.     Sent for ANA / DCCV by  Dagmar      Update:    Labs-  Creatinine 1.34  Potassium 4.5  Sodium 142  Glucose 116  Hemoglobin 12.2  Platelet count 737  White count 8.02  TSH 2.243    Patient has been NPO. Has not missed doses of her Eliquis. Procedure explained. Informed consent obtained for a ANA and potential cardioversion.  D/w  patient and daughter

## 2023-12-01 ENCOUNTER — NURSE TRIAGE (OUTPATIENT)
Dept: OTHER | Facility: OTHER | Age: 83
End: 2023-12-01

## 2023-12-01 ENCOUNTER — TELEPHONE (OUTPATIENT)
Dept: OTHER | Facility: OTHER | Age: 83
End: 2023-12-01

## 2023-12-01 ENCOUNTER — OFFICE VISIT (OUTPATIENT)
Dept: FAMILY MEDICINE CLINIC | Facility: CLINIC | Age: 83
End: 2023-12-01
Payer: COMMERCIAL

## 2023-12-01 VITALS
DIASTOLIC BLOOD PRESSURE: 78 MMHG | HEIGHT: 63 IN | BODY MASS INDEX: 28.88 KG/M2 | WEIGHT: 163 LBS | OXYGEN SATURATION: 97 % | SYSTOLIC BLOOD PRESSURE: 138 MMHG | HEART RATE: 72 BPM

## 2023-12-01 DIAGNOSIS — G47.33 OSA (OBSTRUCTIVE SLEEP APNEA): ICD-10-CM

## 2023-12-01 DIAGNOSIS — R60.0 EDEMA OF BOTH LEGS: ICD-10-CM

## 2023-12-01 DIAGNOSIS — I48.20 CHRONIC ATRIAL FIBRILLATION (HCC): Primary | ICD-10-CM

## 2023-12-01 PROCEDURE — 99214 OFFICE O/P EST MOD 30 MIN: CPT | Performed by: FAMILY MEDICINE

## 2023-12-01 RX ORDER — FUROSEMIDE 20 MG/1
20 TABLET ORAL 2 TIMES DAILY
Qty: 30 TABLET | Refills: 3 | Status: SHIPPED | OUTPATIENT
Start: 2023-12-01

## 2023-12-01 RX ORDER — POTASSIUM CHLORIDE 750 MG/1
10 TABLET, EXTENDED RELEASE ORAL 2 TIMES DAILY
Qty: 90 TABLET | Refills: 3 | Status: SHIPPED | OUTPATIENT
Start: 2023-12-01

## 2023-12-01 NOTE — TELEPHONE ENCOUNTER
Reason for Disposition  • [1] Swollen foot AND [2] no fever  (Exceptions: localized bump from bunions, calluses, insect bite, sting)    Answer Assessment - Initial Assessment Questions  1. ONSET: "When did the pain start?"       Pt mentioned it to daughter last night  2. LOCATION: "Where is the pain located?"       Both feet, but left foot is worse   3. PAIN: "How bad is the pain?"    (Scale 1-10; or mild, moderate, severe)   - MILD (1-3): doesn't interfere with normal activities. - MODERATE (4-7): interferes with normal activities (e.g., work or school) or awakens from sleep, limping.    - SEVERE (8-10): excruciating pain, unable to do any normal activities, unable to walk.       "7". It hurts when she walks   4. WORK OR EXERCISE: "Has there been any recent work or exercise that involved this part of the body?"       No real walking around. She goes from her chair to the BR and then from the chair to her bedroom  5. CAUSE: "What do you think is causing the foot pain?"      unsure  6. OTHER SYMPTOMS: "Do you have any other symptoms?" (e.g., leg pain, rash, fever, numbness)      Nothing more than usual.  Her feet are swollen and purple. Her left foot is hot . Both feet are swollen and painful    Protocols used:  Foot Pain-ADULT-

## 2023-12-01 NOTE — TELEPHONE ENCOUNTER
Daughter called stating pt's kasia feet have become swollen, painful and discolored over the last 2 days. Pt's left foot is worse than her right. The pain is causing pt to limit her ambulation and activities. Daughter states her feet feel "hot. Daughter would like to get pt into office today to have feet evaluated and treated. Daughter will call back after regular office hours to check on availability of appointment today.

## 2023-12-01 NOTE — TELEPHONE ENCOUNTER
Regarding: post op issues  ----- Message from Geoffrey Stanley sent at 12/1/2023  7:33 AM EST -----  "My mom had a procedure done on Wednesday and her foot is hot to touch I think that she needs  to be seen today"  ## The message may be incomplete. It was sent as a result of a timeout.  ##    ----- Message from Geoffrey Stanley sent at 12/1/2023  7:33 AM EST -----  "My mom had a procedure done on Wednesday and her foot is hot to touch I think that she needs  to be seen today"    ----- Message from Geoffrey Stanley sent at 12/1/2023  7:32 AM EST -----  "My mom had a procedure done on Wednesday and her foot is hot to touch I think that she needs  to be seen today"    ----- Message from Geoffrey Stanley sent at 12/1/2023  7:32 AM EST -----  "My mom had a procedure done on Wednesday and her foot is hot to touch I think that she needs  to be seen today"    ----- Message from Geoffrey Stanley sent at 12/1/2023  7:32 AM EST -----  "My mom had a procedure done on Wednesday and her foot is hot to touch I think that she needs  to be seen today"

## 2023-12-01 NOTE — PROGRESS NOTES
Assessment/Plan:       1. Chronic atrial fibrillation (HCC)  -     furosemide (LASIX) 20 mg tablet; Take 1 tablet (20 mg total) by mouth 2 (two) times a day  -     potassium chloride (K-DUR,KLOR-CON) 10 mEq tablet; Take 1 tablet (10 mEq total) by mouth 2 (two) times a day  -     Basic metabolic panel; Future    2. SIDDHARTHA (obstructive sleep apnea)  -     Diagnostic Sleep Study; Future; Expected date: 12/02/2023          Subjective:      Patient ID: Vinh Noriega is a 78 y.o. female. The patient was recently admitted for a cardioversion for chronic atrial flutter and however the procedure was aborted when on echo they saw a blood clot. She is on Eliquis. She does not feel rapid heart rate or palpitations. She has been retaining fluid for the past several days. Her ejection fraction 70% but hyperdynamic with stiffness. She does not have any shortness of breath or orthopnea. No chest pain. She is on hydrochlorothiazide. The following portions of the patient's history were reviewed and updated as appropriate: allergies, current medications, past family history, past medical history, past social history, past surgical history, and problem list.    Review of Systems   Respiratory: Negative. Cardiovascular: Negative. Gastrointestinal: Negative. Objective:      /78   Pulse 72   Ht 5' 3" (1.6 m)   Wt 73.9 kg (163 lb)   SpO2 97%   BMI 28.87 kg/m²          Physical Exam  Vitals and nursing note reviewed. Constitutional:       Appearance: Normal appearance. HENT:      Head: Normocephalic and atraumatic. Nose: Nose normal.      Mouth/Throat:      Mouth: Mucous membranes are moist.   Eyes:      Extraocular Movements: Extraocular movements intact. Pupils: Pupils are equal, round, and reactive to light. Cardiovascular:      Rate and Rhythm: Normal rate. Rhythm irregular. Pulses: Normal pulses.    Pulmonary:      Effort: Pulmonary effort is normal.      Breath sounds: Normal breath sounds. Abdominal:      General: Bowel sounds are normal.      Palpations: Abdomen is soft. Musculoskeletal:      Cervical back: Normal range of motion. Skin:     General: Skin is warm and dry. Capillary Refill: Capillary refill takes less than 2 seconds. Neurological:      General: No focal deficit present. Mental Status: She is alert.    Psychiatric:         Mood and Affect: Mood normal.

## 2023-12-06 DIAGNOSIS — I48.91 NEW ONSET A-FIB (HCC): ICD-10-CM

## 2023-12-06 RX ORDER — MULTIVIT-MIN/IRON/FOLIC ACID/K 18-600-40
1 CAPSULE ORAL DAILY
Qty: 30 TABLET | Refills: 0 | Status: SHIPPED | OUTPATIENT
Start: 2023-12-06

## 2023-12-11 DIAGNOSIS — I48.20 CHRONIC ATRIAL FIBRILLATION (HCC): ICD-10-CM

## 2023-12-12 RX ORDER — FUROSEMIDE 20 MG/1
20 TABLET ORAL 2 TIMES DAILY
Qty: 30 TABLET | Refills: 3 | Status: SHIPPED | OUTPATIENT
Start: 2023-12-12

## 2023-12-14 ENCOUNTER — RA CDI HCC (OUTPATIENT)
Dept: OTHER | Facility: HOSPITAL | Age: 83
End: 2023-12-14

## 2023-12-14 NOTE — PROGRESS NOTES
[address] coding opportunities       Chart reviewed, no opportunity found: [address] Review     Patients Insurance     Medicare Insurance: [employer] patient

## 2023-12-20 DIAGNOSIS — I48.91 NEW ONSET A-FIB (HCC): ICD-10-CM

## 2023-12-20 RX ORDER — APIXABAN 5 MG/1
5 TABLET, FILM COATED ORAL 2 TIMES DAILY
Qty: 60 TABLET | Refills: 0 | Status: SHIPPED | OUTPATIENT
Start: 2023-12-20

## 2023-12-23 ENCOUNTER — RA CDI HCC (OUTPATIENT)
Dept: OTHER | Facility: HOSPITAL | Age: 83
End: 2023-12-23

## 2024-01-01 DIAGNOSIS — K59.09 OTHER CONSTIPATION: ICD-10-CM

## 2024-01-01 DIAGNOSIS — I48.91 NEW ONSET A-FIB (HCC): ICD-10-CM

## 2024-01-02 RX ORDER — SENNOSIDES 8.6 MG
17.2 TABLET ORAL 2 TIMES DAILY
Qty: 120 TABLET | Refills: 0 | Status: SHIPPED | OUTPATIENT
Start: 2024-01-02

## 2024-01-02 RX ORDER — MULTIVIT-MIN/IRON/FOLIC ACID/K 18-600-40
1 CAPSULE ORAL DAILY
Qty: 30 TABLET | Refills: 0 | Status: SHIPPED | OUTPATIENT
Start: 2024-01-02

## 2024-01-23 DIAGNOSIS — I48.91 NEW ONSET A-FIB (HCC): ICD-10-CM

## 2024-01-23 DIAGNOSIS — I48.20 CHRONIC ATRIAL FIBRILLATION (HCC): ICD-10-CM

## 2024-01-23 DIAGNOSIS — E78.2 MIXED HYPERLIPIDEMIA: ICD-10-CM

## 2024-01-23 RX ORDER — FUROSEMIDE 20 MG/1
20 TABLET ORAL 2 TIMES DAILY
Qty: 180 TABLET | Refills: 1 | Status: SHIPPED | OUTPATIENT
Start: 2024-01-23

## 2024-01-23 RX ORDER — ATORVASTATIN CALCIUM 20 MG/1
80 TABLET, FILM COATED ORAL DAILY
Qty: 360 TABLET | Refills: 1 | Status: SHIPPED | OUTPATIENT
Start: 2024-01-23

## 2024-01-23 RX ORDER — ASPIRIN 81 MG/1
81 TABLET ORAL DAILY
Qty: 90 TABLET | Refills: 1 | Status: SHIPPED | OUTPATIENT
Start: 2024-01-23 | End: 2024-07-21

## 2024-01-30 ENCOUNTER — LAB (OUTPATIENT)
Dept: LAB | Facility: CLINIC | Age: 84
End: 2024-01-30
Payer: COMMERCIAL

## 2024-01-30 DIAGNOSIS — I48.19 PERSISTENT ATRIAL FIBRILLATION (HCC): ICD-10-CM

## 2024-01-30 DIAGNOSIS — I48.20 CHRONIC ATRIAL FIBRILLATION (HCC): ICD-10-CM

## 2024-01-30 DIAGNOSIS — I10 HYPERTENSION GOAL BP (BLOOD PRESSURE) < 130/80: ICD-10-CM

## 2024-01-30 DIAGNOSIS — I63.10 CEREBROVASCULAR ACCIDENT (CVA) DUE TO EMBOLISM OF PRECEREBRAL ARTERY (HCC): ICD-10-CM

## 2024-01-30 DIAGNOSIS — E78.5 HYPERLIPIDEMIA LDL GOAL <70: ICD-10-CM

## 2024-01-30 LAB
ANION GAP SERPL CALCULATED.3IONS-SCNC: 12 MMOL/L
BUN SERPL-MCNC: 46 MG/DL (ref 5–25)
CALCIUM SERPL-MCNC: 10 MG/DL (ref 8.4–10.2)
CHLORIDE SERPL-SCNC: 103 MMOL/L (ref 96–108)
CO2 SERPL-SCNC: 25 MMOL/L (ref 21–32)
CREAT SERPL-MCNC: 2.01 MG/DL (ref 0.6–1.3)
ERYTHROCYTE [DISTWIDTH] IN BLOOD BY AUTOMATED COUNT: 14.1 % (ref 11.6–15.1)
GFR SERPL CREATININE-BSD FRML MDRD: 22 ML/MIN/1.73SQ M
GLUCOSE SERPL-MCNC: 154 MG/DL (ref 65–140)
HCT VFR BLD AUTO: 39.9 % (ref 34.8–46.1)
HGB BLD-MCNC: 12.2 G/DL (ref 11.5–15.4)
MCH RBC QN AUTO: 29.3 PG (ref 26.8–34.3)
MCHC RBC AUTO-ENTMCNC: 30.6 G/DL (ref 31.4–37.4)
MCV RBC AUTO: 96 FL (ref 82–98)
PLATELET # BLD AUTO: 278 THOUSANDS/UL (ref 149–390)
PMV BLD AUTO: 11.2 FL (ref 8.9–12.7)
POTASSIUM SERPL-SCNC: 5.3 MMOL/L (ref 3.5–5.3)
RBC # BLD AUTO: 4.16 MILLION/UL (ref 3.81–5.12)
SODIUM SERPL-SCNC: 140 MMOL/L (ref 135–147)
WBC # BLD AUTO: 7.93 THOUSAND/UL (ref 4.31–10.16)

## 2024-01-30 PROCEDURE — 85027 COMPLETE CBC AUTOMATED: CPT

## 2024-01-30 PROCEDURE — 80048 BASIC METABOLIC PNL TOTAL CA: CPT

## 2024-01-30 PROCEDURE — 36415 COLL VENOUS BLD VENIPUNCTURE: CPT

## 2024-01-31 DIAGNOSIS — R79.89 ELEVATED SERUM CREATININE: Primary | ICD-10-CM

## 2024-02-05 DIAGNOSIS — I48.91 NEW ONSET A-FIB (HCC): ICD-10-CM

## 2024-02-06 ENCOUNTER — TELEPHONE (OUTPATIENT)
Dept: NON INVASIVE DIAGNOSTICS | Facility: HOSPITAL | Age: 84
End: 2024-02-06

## 2024-02-06 RX ORDER — MELATONIN
1000 DAILY
Qty: 30 TABLET | Refills: 5 | Status: SHIPPED | OUTPATIENT
Start: 2024-02-06

## 2024-02-06 RX ORDER — APIXABAN 5 MG/1
5 TABLET, FILM COATED ORAL 2 TIMES DAILY
Qty: 60 TABLET | Refills: 5 | Status: SHIPPED | OUTPATIENT
Start: 2024-02-06

## 2024-02-06 NOTE — TELEPHONE ENCOUNTER
Eliquis was flagged by Rx refill protocol, courtesy refill provided, please contact patient to remind her to have her labs drawn that have been ordered.

## 2024-02-06 NOTE — TELEPHONE ENCOUNTER
Spoke w/daughter Devi. Reviewed the following instructions with her:  1). NPO after midnight Tuesday 2/7 . Patient may take am meds wed morning with a sip of water prior to her arrival to the hospital at 7am. (Procedure time is 8am).  2). Patient will need to have a  with her because she may not drive after receiving anesthesia.  3). Asked the daughter to hold her lasix dose until after the procedure is finished.  Daughter stated verbal understanding of these instructions.

## 2024-02-07 ENCOUNTER — ANESTHESIA EVENT (OUTPATIENT)
Dept: NON INVASIVE DIAGNOSTICS | Facility: HOSPITAL | Age: 84
End: 2024-02-07

## 2024-02-07 ENCOUNTER — ANESTHESIA (OUTPATIENT)
Dept: NON INVASIVE DIAGNOSTICS | Facility: HOSPITAL | Age: 84
End: 2024-02-07

## 2024-02-07 ENCOUNTER — HOSPITAL ENCOUNTER (OUTPATIENT)
Dept: NON INVASIVE DIAGNOSTICS | Facility: HOSPITAL | Age: 84
Discharge: HOME | End: 2024-02-07
Payer: COMMERCIAL

## 2024-02-07 ENCOUNTER — HOSPITAL ENCOUNTER (EMERGENCY)
Facility: HOSPITAL | Age: 84
Discharge: ANOTHER HOSPITAL | End: 2024-02-07
Attending: EMERGENCY MEDICINE
Payer: COMMERCIAL

## 2024-02-07 VITALS
TEMPERATURE: 97.9 F | WEIGHT: 163 LBS | RESPIRATION RATE: 18 BRPM | SYSTOLIC BLOOD PRESSURE: 94 MMHG | HEIGHT: 63 IN | DIASTOLIC BLOOD PRESSURE: 62 MMHG | HEART RATE: 61 BPM | BODY MASS INDEX: 28.88 KG/M2 | OXYGEN SATURATION: 97 %

## 2024-02-07 VITALS
DIASTOLIC BLOOD PRESSURE: 53 MMHG | HEIGHT: 63 IN | BODY MASS INDEX: 28.88 KG/M2 | HEART RATE: 36 BPM | WEIGHT: 163 LBS | SYSTOLIC BLOOD PRESSURE: 121 MMHG | RESPIRATION RATE: 34 BRPM | OXYGEN SATURATION: 96 % | TEMPERATURE: 97.8 F

## 2024-02-07 DIAGNOSIS — I10 ESSENTIAL (PRIMARY) HYPERTENSION: ICD-10-CM

## 2024-02-07 DIAGNOSIS — I63.10 CEREBRAL INFARCTION DUE TO EMBOLISM OF UNSPECIFIED PRECEREBRAL ARTERY (HCC): ICD-10-CM

## 2024-02-07 DIAGNOSIS — I48.91 ATRIAL FIBRILLATION (HCC): ICD-10-CM

## 2024-02-07 DIAGNOSIS — I48.19 OTHER PERSISTENT ATRIAL FIBRILLATION (HCC): ICD-10-CM

## 2024-02-07 DIAGNOSIS — I48.0 PAROXYSMAL ATRIAL FIBRILLATION (HCC): ICD-10-CM

## 2024-02-07 DIAGNOSIS — I48.91 ATRIAL FIBRILLATION STATUS POST CARDIOVERSION (HCC): ICD-10-CM

## 2024-02-07 DIAGNOSIS — R00.1 BRADYCARDIA: Primary | ICD-10-CM

## 2024-02-07 DIAGNOSIS — E78.5 HYPERLIPIDEMIA, UNSPECIFIED: ICD-10-CM

## 2024-02-07 LAB
ALBUMIN SERPL BCP-MCNC: 4 G/DL (ref 3.5–5)
ALP SERPL-CCNC: 63 U/L (ref 34–104)
ALT SERPL W P-5'-P-CCNC: 8 U/L (ref 7–52)
ANION GAP SERPL CALCULATED.3IONS-SCNC: 8 MMOL/L
AST SERPL W P-5'-P-CCNC: 12 U/L (ref 13–39)
ATRIAL RATE: 357 BPM
BASOPHILS # BLD AUTO: 0.07 THOUSANDS/ÂΜL (ref 0–0.1)
BASOPHILS NFR BLD AUTO: 1 % (ref 0–1)
BILIRUB SERPL-MCNC: 0.53 MG/DL (ref 0.2–1)
BNP SERPL-MCNC: 186 PG/ML (ref 0–100)
BUN SERPL-MCNC: 44 MG/DL (ref 5–25)
CALCIUM SERPL-MCNC: 9.6 MG/DL (ref 8.4–10.2)
CHLORIDE SERPL-SCNC: 108 MMOL/L (ref 96–108)
CO2 SERPL-SCNC: 22 MMOL/L (ref 21–32)
CREAT SERPL-MCNC: 1.77 MG/DL (ref 0.6–1.3)
EOSINOPHIL # BLD AUTO: 0.25 THOUSAND/ÂΜL (ref 0–0.61)
EOSINOPHIL NFR BLD AUTO: 3 % (ref 0–6)
ERYTHROCYTE [DISTWIDTH] IN BLOOD BY AUTOMATED COUNT: 14.3 % (ref 11.6–15.1)
GFR SERPL CREATININE-BSD FRML MDRD: 25 ML/MIN/1.73SQ M
GLUCOSE SERPL-MCNC: 111 MG/DL (ref 65–140)
HCT VFR BLD AUTO: 34.5 % (ref 34.8–46.1)
HGB BLD-MCNC: 10.8 G/DL (ref 11.5–15.4)
IMM GRANULOCYTES # BLD AUTO: 0.06 THOUSAND/UL (ref 0–0.2)
IMM GRANULOCYTES NFR BLD AUTO: 1 % (ref 0–2)
LYMPHOCYTES # BLD AUTO: 2.65 THOUSANDS/ÂΜL (ref 0.6–4.47)
LYMPHOCYTES NFR BLD AUTO: 30 % (ref 14–44)
MAGNESIUM SERPL-MCNC: 2 MG/DL (ref 1.9–2.7)
MCH RBC QN AUTO: 29 PG (ref 26.8–34.3)
MCHC RBC AUTO-ENTMCNC: 31.3 G/DL (ref 31.4–37.4)
MCV RBC AUTO: 93 FL (ref 82–98)
MONOCYTES # BLD AUTO: 0.9 THOUSAND/ÂΜL (ref 0.17–1.22)
MONOCYTES NFR BLD AUTO: 10 % (ref 4–12)
NEUTROPHILS # BLD AUTO: 4.95 THOUSANDS/ÂΜL (ref 1.85–7.62)
NEUTS SEG NFR BLD AUTO: 55 % (ref 43–75)
NRBC BLD AUTO-RTO: 0 /100 WBCS
PLATELET # BLD AUTO: 257 THOUSANDS/UL (ref 149–390)
PMV BLD AUTO: 10.3 FL (ref 8.9–12.7)
POTASSIUM SERPL-SCNC: 5 MMOL/L (ref 3.5–5.3)
PROT SERPL-MCNC: 6.9 G/DL (ref 6.4–8.4)
QRS AXIS: 16 DEGREES
QRSD INTERVAL: 82 MS
QT INTERVAL: 416 MS
QTC INTERVAL: 429 MS
RBC # BLD AUTO: 3.72 MILLION/UL (ref 3.81–5.12)
SL CV LV EF: 60
SODIUM SERPL-SCNC: 138 MMOL/L (ref 135–147)
T WAVE AXIS: 68 DEGREES
VENTRICULAR RATE: 64 BPM
WBC # BLD AUTO: 8.88 THOUSAND/UL (ref 4.31–10.16)

## 2024-02-07 PROCEDURE — 80053 COMPREHEN METABOLIC PANEL: CPT | Performed by: EMERGENCY MEDICINE

## 2024-02-07 PROCEDURE — 36415 COLL VENOUS BLD VENIPUNCTURE: CPT | Performed by: EMERGENCY MEDICINE

## 2024-02-07 PROCEDURE — 83735 ASSAY OF MAGNESIUM: CPT | Performed by: EMERGENCY MEDICINE

## 2024-02-07 PROCEDURE — 99285 EMERGENCY DEPT VISIT HI MDM: CPT | Performed by: EMERGENCY MEDICINE

## 2024-02-07 PROCEDURE — 99285 EMERGENCY DEPT VISIT HI MDM: CPT

## 2024-02-07 PROCEDURE — 92960 CARDIOVERSION ELECTRIC EXT: CPT

## 2024-02-07 PROCEDURE — 83880 ASSAY OF NATRIURETIC PEPTIDE: CPT | Performed by: EMERGENCY MEDICINE

## 2024-02-07 PROCEDURE — 93005 ELECTROCARDIOGRAM TRACING: CPT

## 2024-02-07 PROCEDURE — 85025 COMPLETE CBC W/AUTO DIFF WBC: CPT | Performed by: EMERGENCY MEDICINE

## 2024-02-07 PROCEDURE — 93312 ECHO TRANSESOPHAGEAL: CPT

## 2024-02-07 RX ORDER — PROPOFOL 10 MG/ML
INJECTION, EMULSION INTRAVENOUS AS NEEDED
Status: DISCONTINUED | OUTPATIENT
Start: 2024-02-07 | End: 2024-02-07

## 2024-02-07 RX ORDER — SODIUM CHLORIDE, SODIUM LACTATE, POTASSIUM CHLORIDE, CALCIUM CHLORIDE 600; 310; 30; 20 MG/100ML; MG/100ML; MG/100ML; MG/100ML
INJECTION, SOLUTION INTRAVENOUS CONTINUOUS PRN
Status: DISCONTINUED | OUTPATIENT
Start: 2024-02-07 | End: 2024-02-07

## 2024-02-07 RX ORDER — LIDOCAINE HYDROCHLORIDE 20 MG/ML
INJECTION, SOLUTION EPIDURAL; INFILTRATION; INTRACAUDAL; PERINEURAL AS NEEDED
Status: DISCONTINUED | OUTPATIENT
Start: 2024-02-07 | End: 2024-02-07

## 2024-02-07 RX ORDER — ATROPINE SULFATE 1 MG/ML
INJECTION, SOLUTION INTRAVENOUS AS NEEDED
Status: DISCONTINUED | OUTPATIENT
Start: 2024-02-07 | End: 2024-02-07

## 2024-02-07 RX ORDER — SODIUM CHLORIDE, SODIUM LACTATE, POTASSIUM CHLORIDE, CALCIUM CHLORIDE 600; 310; 30; 20 MG/100ML; MG/100ML; MG/100ML; MG/100ML
50 INJECTION, SOLUTION INTRAVENOUS CONTINUOUS
Status: DISCONTINUED | OUTPATIENT
Start: 2024-02-07 | End: 2024-02-08 | Stop reason: HOSPADM

## 2024-02-07 RX ADMIN — SODIUM CHLORIDE, SODIUM LACTATE, POTASSIUM CHLORIDE, AND CALCIUM CHLORIDE: .6; .31; .03; .02 INJECTION, SOLUTION INTRAVENOUS at 08:18

## 2024-02-07 RX ADMIN — PROPOFOL 20 MG: 10 INJECTION, EMULSION INTRAVENOUS at 08:28

## 2024-02-07 RX ADMIN — PROPOFOL 10 MG: 10 INJECTION, EMULSION INTRAVENOUS at 08:31

## 2024-02-07 RX ADMIN — PROPOFOL 80 MG: 10 INJECTION, EMULSION INTRAVENOUS at 08:22

## 2024-02-07 RX ADMIN — LIDOCAINE HYDROCHLORIDE 100 MG: 20 INJECTION, SOLUTION EPIDURAL; INFILTRATION; INTRACAUDAL; PERINEURAL at 08:22

## 2024-02-07 RX ADMIN — PROPOFOL 20 MG: 10 INJECTION, EMULSION INTRAVENOUS at 08:25

## 2024-02-07 RX ADMIN — ATROPINE SULFATE 1 MG: 1 INJECTION, SOLUTION INTRAVENOUS at 08:39

## 2024-02-07 NOTE — ANESTHESIA PREPROCEDURE EVALUATION
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 Wall thickness is moderately increased. The left ventricular ejection fraction is 70% by visual estimation. Systolic function is hyperdynamic. Wall motion is normal.    Left Atrium: The atrium is moderately dilated (42-48 mL/m2).    Mitral Valve: There is moderate calcification. There is moderate annular calcification. There is mild regurgitation. There is no evidence of stenosis. The mitral valve mean gradient is 4mmHg.    Tricuspid Valve: There is mild regurgitation. The estimated right ventricular systolic pressure is 35.00 mmHg.    IVC/SVC: The right atrial pressure is estimated at 3.0 mmHg. The inferior vena cava is normal in size. Respirophasic changes were normal.     Physical Exam    Airway    Mallampati score: I  TM Distance: >3 FB  Neck ROM: full     Dental   No notable dental hx     Cardiovascular  Rhythm: irregular, Rate: normal    Pulmonary   Breath sounds clear to auscultation    Other Findings  post-pubertal.      Anesthesia Plan  ASA Score- 4     Anesthesia Type- IV sedation with anesthesia with ASA Monitors.         Additional Monitors:     Airway Plan:            Plan Factors-Exercise tolerance (METS): >4 METS.    Chart reviewed. EKG reviewed.   Patient summary  reviewed.    Patient is not a current smoker.              Induction-     Postoperative Plan-     Informed Consent- Anesthetic plan and risks discussed with patient and daughter.  I personally reviewed this patient with the CRNA. Discussed and agreed on the Anesthesia Plan with the CRNA..

## 2024-02-07 NOTE — ED NOTES
Pt resting comfortably with no signs of distress noted.  Denies CP, SOB, dizziness.  Waiting on  time.       Rhea Dumont RN  02/07/24 1340       Rhea Dumont RN  02/07/24 1349

## 2024-02-07 NOTE — ANESTHESIA POSTPROCEDURE EVALUATION
Post-Op Assessment Note    CV Status:  Stable    Pain management: satisfactory to patient       Mental Status:  Alert and awake   Hydration Status:  Stable   PONV Controlled:  None   Airway Patency:  Patent     Post Op Vitals Reviewed: Yes    No anethesia notable event occurred.    Staff: CRNA               BP   96/50   Temp   37.5   Pulse  46   Resp   14   SpO2   96

## 2024-02-07 NOTE — PROGRESS NOTES
Cardiology progress    Pt underwent successful ANA guided DCC 360J x1    Post DCC had ~1min of asystole requiring temp transcutaneous pacing  Eventually regained SB with periodic sinus pauses/asystole of ~4sec    Pt regained consciousness w/o sxs, adeq hemodynamics    Rhythm eventually settled into SB at 35    D/w family and pt- will need PPM to adequately manage rhythm    Daughter has PPM and follows with [facility]- she would like to have her mother transferred to their care    D/w [facility] physician  Transfer to reading  Keep NPO  Hold apixaban and nadolol

## 2024-02-07 NOTE — EMTALA/ACUTE CARE TRANSFER
[facility] CARDIOLOGY  [address] [place] [id number]  No information on file.      ACUTE CARE TRANSFER CONSENT    NAME Malgorzata Mims                                         1940                              MRN [id number]    I have been informed of my rights regarding examination, treatment, and transfer   by Dr. Dewitt    Benefits:  Pacemaker     Risks:  SSS      Consent for Transfer:  I acknowledge that my medical condition has been evaluated and explained to me by the treating physician or other qualified medical person and/or my attending physician, who has recommended that I be transferred to the service of    at  . The above potential benefits of such transfer, the potential risks associated with such transfer, and the probable risks of not being transferred have been explained to me, and I fully understand them.  The doctor has explained that, in my case, the benefits of transfer outweigh the risks.  I agree to be transferred.    I authorize the performance of emergency medical procedures and treatments upon me in both transit and upon arrival at the receiving facility.  Additionally, I authorize the release of any and all medical records to the receiving facility and request they be transported with me, if possible.  I understand that the safest mode of transportation during a medical emergency is an ambulance and that the Hospital advocates the use of this mode of transport. Risks of traveling to the receiving facility by car, including absence of medical control, life sustaining equipment, such as oxygen, and medical personnel has been explained to me and I fully understand them.    (ANURADHA CORRECT BOX BELOW)  [  ]  I consent to the stated transfer and to be transported by ambulance/helicopter.  [  ]  I consent to the stated transfer, but refuse transportation by ambulance and accept full responsibility for my transportation by car.  I understand the risks of  non-ambulance transfers and I exonerate the Hospital and its staff from any deterioration in my condition that results from this refusal.    X___________________________________________    DATE  24  TIME________  Signature of patient or legally responsible individual signing on patient behalf           RELATIONSHIP TO PATIENT_________________________          Provider Certification    NAME Malgorzata Mims                                         1940                              MRN [id number]    A medical screening exam was performed on the above named patient.  Based on the examination:    Condition Necessitating Transfer SSS    Patient Condition:  SSS    Reason for Transfer:  SSS needs pacemaker     Transfer Requirements: Facility   [facility]   Space available and qualified personnel available for treatment as acknowledged by    Agreed to accept transfer and to provide appropriate medical treatment as acknowledged by          Appropriate medical records of the examination and treatment of the patient are provided at the time of transfer   STAFF INITIAL WHEN COMPLETED _______  Transfer will be performed by qualified personnel from    and appropriate transfer equipment as required, including the use of necessary and appropriate life support measures.    Provider Certification: I have examined the patient and explained the following risks and benefits of being transferred/refusing transfer to the patient/family:         Based on these reasonable risks and benefits to the patient and/or the unborn child(latonya), and based upon the information available at the time of the patient’s examination, I certify that the medical benefits reasonably to be expected from the provision of appropriate medical treatments at another medical facility outweigh the increasing risks, if any, to the individual’s medical condition, and in the case of labor to the unborn child, from effecting the  transfer.    X____________________________________________ DATE 02/07/24        TIME_______      ORIGINAL - SEND TO MEDICAL RECORDS   COPY - SEND WITH PATIENT DURING TRANSFER

## 2024-02-07 NOTE — ED PROVIDER NOTES
History  Chief Complaint   Patient presents with    Slow Heart Rate     Patient had ANA/Cardioversion today, patient developed SSS after cardioversion. Denies CP, SOB, dizziness.  C/O fatigue.     This is a very pleasant 83-year-old female who was seen in the PACU where she was being evaluated by cardiology as an outpatient.  Patient has a history of paroxysmal atrial fibrillation and was scheduled to undergo ANA cardioversion in November 2023, however, was unable to have the procedure performed secondary to the discovery of a new left atrial appendage thrombus.  Patient was on anticoagulation therapy and decision was made to continue the anticoagulation until today when she was to undergo a repeat ANA cardioversion.    Underwent that procedure and following that was found to have long pauses and bradycardia.  Patient remains asymptomatic from this.  No chest pain or shortness of breath.    Concern for sick sinus syndrome.  Her daughter has history of same.    Patient is likely in need of pacemaker placement.  Cardiology is requesting the patient be transferred and family prefers [facility] in [facility].    We are asked to see the patient in consultation and evaluation to help facilitate transfer.      History provided by:  Patient and caregiver (Cardiology)  Medical Problem - Major  Location:  Bradycardia  Associated symptoms: no abdominal pain, no chest pain, no diarrhea, no nausea, no shortness of breath, no vomiting and no wheezing        Prior to Admission Medications   Prescriptions Last Dose Informant Patient Reported? Taking?   Eliquis 5 MG   No No   Sig: Take 1 tablet by mouth twice daily   Probiotic Product (PROBIOTIC-10 PO)   Yes No   Sig: Take by mouth   acetaminophen (TYLENOL) 650 mg CR tablet   Yes No   Sig: Take 650 mg by mouth   aspirin (ECOTRIN LOW STRENGTH) 81 mg EC tablet   No No   Sig: Take 1 tablet (81 mg total) by mouth daily   atorvastatin (LIPITOR) 20 mg tablet   No No   Sig:  Take 4 tablets (80 mg total) by mouth daily   cholecalciferol (VITAMIN D3) 1,000 units tablet   No No   Sig: Take 1 tablet by mouth once daily   enalapril (VASOTEC) 10 mg tablet   No No   Sig: Take 1 tablet (10 mg total) by mouth daily   fluticasone (FLONASE) 50 mcg/act nasal spray   Yes No   Si spray into each nostril daily   furosemide (LASIX) 20 mg tablet   No No   Sig: Take 1 tablet (20 mg total) by mouth 2 (two) times a day   latanoprost (XALATAN) 0.005 % ophthalmic solution   Yes No   Sig: INSTILL 1 DROP INTO EACH EYE IN THE EVENING   nadolol (CORGARD) 20 mg tablet   No No   Sig: Take 1 tablet (20 mg total) by mouth daily   omeprazole (PriLOSEC) 40 MG capsule   No No   Sig: Take 1 capsule (40 mg total) by mouth daily   potassium chloride (K-DUR,KLOR-CON) 10 mEq tablet   No No   Sig: Take 1 tablet (10 mEq total) by mouth 2 (two) times a day   senna (SENOKOT) 8.6 mg   No No   Sig: Take 2 tablets by mouth twice daily   venlafaxine (EFFEXOR-XR) 37.5 mg 24 hr capsule   No No   Sig: Take 1 capsule (37.5 mg total) by mouth daily      Facility-Administered Medications: None       Past Medical History:   Diagnosis Date    Afib (HCC)     Arthritis     GERD (gastroesophageal reflux disease)     Hyperlipidemia     Hypertension     Stroke (HCC)        Past Surgical History:   Procedure Laterality Date    NO PAST SURGERIES      SINUS SURGERY         History reviewed. No pertinent family history.  I have reviewed and agree with the history as documented.    E-Cigarette/Vaping    E-Cigarette Use Never User      E-Cigarette/Vaping Substances     Social History     Tobacco Use    Smoking status: Never    Smokeless tobacco: Never   Vaping Use    Vaping status: Never Used   Substance Use Topics    Alcohol use: Not Currently    Drug use: Never       Review of Systems   Respiratory:  Negative for shortness of breath and wheezing.    Cardiovascular:  Negative for chest pain, palpitations and leg swelling.   Gastrointestinal:   Negative for abdominal pain, diarrhea, nausea and vomiting.   All other systems reviewed and are negative.      Physical Exam  Physical Exam  Vitals and nursing note reviewed.   Constitutional:       General: She is not in acute distress.     Appearance: She is normal weight. She is not ill-appearing or toxic-appearing.   HENT:      Head: Normocephalic and atraumatic.      Right Ear: External ear normal.      Left Ear: External ear normal.      Nose: Nose normal.      Mouth/Throat:      Mouth: Mucous membranes are moist.   Cardiovascular:      Rate and Rhythm: Bradycardia present.   Pulmonary:      Effort: Pulmonary effort is normal. No respiratory distress.      Breath sounds: No stridor. No wheezing or rhonchi.   Abdominal:      General: Abdomen is flat. There is no distension.   Musculoskeletal:         General: No signs of injury.      Right lower leg: No edema.      Left lower leg: No edema.   Skin:     Coloration: Skin is not pale.   Neurological:      General: No focal deficit present.      Mental Status: She is alert.   Psychiatric:         Mood and Affect: Mood normal.         Thought Content: Thought content normal.         Judgment: Judgment normal.         Vital Signs  ED Triage Vitals [02/07/24 1003]   Temperature Pulse Respirations Blood Pressure SpO2   (!) 97.1 °F (36.2 °C) (!) 39 18 102/53 96 %      Temp Source Heart Rate Source Patient Position - Orthostatic VS BP Location FiO2 (%)   Temporal Monitor Lying Left arm --      Pain Score       No Pain           Vitals:    02/07/24 1003 02/07/24 1030 02/07/24 1051 02/07/24 1130   BP: 102/53 (!) 97/49  (!) 93/46   Pulse: (!) 39 (!) 45 (!) 38 (!) 51   Patient Position - Orthostatic VS: Lying Lying  Lying         Visual Acuity      ED Medications  Medications - No data to display    Diagnostic Studies  Results Reviewed       Procedure Component Value Units Date/Time    B-Type Natriuretic Peptide(BNP) [[id number]]  (Abnormal) Collected: 02/07/24 1015    Lab  Status: Final result Specimen: Blood from Arm, Right Updated: 02/07/24 1057      pg/mL     Comprehensive metabolic panel [[id number]]  (Abnormal) Collected: 02/07/24 1015    Lab Status: Final result Specimen: Blood from Arm, Right Updated: 02/07/24 1050     Sodium 138 mmol/L      Potassium 5.0 mmol/L      Chloride 108 mmol/L      CO2 22 mmol/L      ANION GAP 8 mmol/L      BUN 44 mg/dL      Creatinine 1.77 mg/dL      Glucose 111 mg/dL      Calcium 9.6 mg/dL      AST 12 U/L      ALT 8 U/L      Alkaline Phosphatase 63 U/L      Total Protein 6.9 g/dL      Albumin 4.0 g/dL      Total Bilirubin 0.53 mg/dL      eGFR 25 ml/min/1.73sq m     Narrative:      National Kid[employer] Disease Foundation guidelines for Chronic Kidney Disease (CKD):     Stage 1 with normal or high GFR (GFR > 90 mL/min/1.73 square meters)    Stage 2 Mild CKD (GFR = 60-89 mL/min/1.73 square meters)    Stage 3A Moderate CKD (GFR = 45-59 mL/min/1.73 square meters)    Stage 3B Moderate CKD (GFR = 30-44 mL/min/1.73 square meters)    Stage 4 Severe CKD (GFR = 15-29 mL/min/1.73 square meters)    Stage 5 End Stage CKD (GFR <15 mL/min/1.73 square meters)  Note: GFR calculation is accurate only with a steady state creatinine    Magnesium [[id number]]  (Normal) Collected: 02/07/24 1015    Lab Status: Final result Specimen: Blood from Arm, Right Updated: 02/07/24 1050     Magnesium 2.0 mg/dL     CBC and differential [[id number]]  (Abnormal) Collected: 02/07/24 1015    Lab Status: Final result Specimen: Blood from Arm, Right Updated: 02/07/24 1023     WBC 8.88 Thousand/uL      RBC 3.72 Million/uL      Hemoglobin 10.8 g/dL      Hematocrit 34.5 %      MCV 93 fL      MCH 29.0 pg      MCHC 31.3 g/dL      RDW 14.3 %      MPV 10.3 fL      Platelets 257 Thousands/uL      nRBC 0 /100 WBCs      Neutrophils Relative 55 %      Immat GRANS % 1 %      Lymphocytes Relative 30 %      Monocytes Relative 10 %      Eosinophils Relative 3 %      Basophils Relative 1 %      Neutrophils  Absolute 4.95 Thousands/µL      Immature Grans Absolute 0.06 Thousand/uL      Lymphocytes Absolute 2.65 Thousands/µL      Monocytes Absolute 0.90 Thousand/µL      Eosinophils Absolute 0.25 Thousand/µL      Basophils Absolute 0.07 Thousands/µL                    No orders to display              Procedures  Procedures         ED Course  ED Course as of 02/07/24 1201   Wed Feb 07, 2024   1013 Patient access center made aware of the need for transfer.  They will communicate with both myself and cardiology.   1201 Arrangements made by cardiology for patient to be transferred to [facility].  Patient was accepted to that facility.                               SBIRT 20yo+      Flowsheet Row Most Recent Value   Initial Alcohol Screen: US AUDIT-C     1. How often do you have a drink containing alcohol? 1 Filed at: 02/07/2024 1143   2. How many drinks containing alcohol do you have on a typical day you are drinking?  0 Filed at: 02/07/2024 1143   3b. FEMALE Any Age, or MALE 65+: How often do you have 4 or more drinks on one occassion? 0 Filed at: 02/07/2024 1143   Audit-C Score 1 Filed at: 02/07/2024 1143   SHYANN: How many times in the past year have you...    Used an illegal drug or used a prescription medication for non-medical reasons? Never Filed at: 02/07/2024 1143                      Medical Decision Making  Patient presented to the emergency department and a MSE was performed. The patient was evaluated and diagnosed with acute bradycardia. This is a new issue that will require additional planned work-up and treatment in a hospitalized setting. As may have been required as part of this evaluation, clinical laboratory test, radiology imaging and medical testing (I.e. EKG) were ordered as necessitated by the patient's presentation. I independently reviewed these studies, imaging and testing. This patient's case is considered to be a considerable risk secondary to the above listed disease process and poses a threat to  the patient's well-being and baseline function. Further in-patient diagnostic testing and management, which may include the administration of parenteral medications, is required. Pt required transfer to tertiary facility secondary to the need for pacemaker placement.        Problems Addressed:  Atrial fibrillation status post cardioversion (HCC): chronic illness or injury with exacerbation, progression, or side effects of treatment  Bradycardia: undiagnosed new problem with uncertain prognosis  Paroxysmal atrial fibrillation (HCC): chronic illness or injury    Amount and/or Complexity of Data Reviewed  Independent Historian:      Details: Cardiology and patient's daughter  Labs: ordered.  Discussion of management or test interpretation with external provider(s): Care management with cardiology.             Disposition  Final diagnoses:   Bradycardia   Paroxysmal atrial fibrillation (HCC)   Atrial fibrillation status post cardioversion (HCC)     Time reflects when diagnosis was documented in both MDM as applicable and the Disposition within this note       Time User Action Codes Description Comment    2/7/2024 10:04 AM Connor Jenkins Add [R00.1] Bradycardia     2/7/2024 10:04 AM Connor Jenkins Add [I48.0] Paroxysmal atrial fibrillation (HCC)     2/7/2024 10:04 AM Connor Jenkins Add [I48.91] Atrial fibrillation status post cardioversion (HCC)           ED Disposition       ED Disposition   Transfer to Another Facility - Out of Network    Condition   --    Date/Time   Wed Feb 7, 2024 1003    Comment   [facility] MD Documentation      Flowsheet Row Most Recent Value   Patient Condition The patient has been stabilized such that within reasonable medical probability, no material deterioration of the patient condition or the condition of the unborn child(latonya) is likely to result from the transfer   Reason for Transfer Level of Care needed not available  at this facility  [Possible pacemaker placement]   Benefits of Transfer --  [Services for possible pacemaker placement]   Risks of Transfer Potential for delay in receiving treatment, Potential deterioration of medical condition, Loss of IV, Increased discomfort during transfer   Accepting Physician Dr. Gan   Accepting Facility Name, [facility]   Sending MD Dr. Jenkins   Provider Certification General risk, such as traffic hazards, adverse weather conditions, rough terrain or turbulence, possible failure of equipment (including vehicle or aircraft), or consequences of actions of persons outside the control of the transport personnel, Unanticipated needs of medical equipment and personnel during transport, Risk of worsening condition          RN Documentation      Flowsheet Row Most Recent Value   Accepting Facility Name, [facility]   Bed Assignment CCU   Transport Mode Ambulance   Level of Care Advanced life support          Follow-up Information    None         Patient's Medications   Discharge Prescriptions    No medications on file       No discharge procedures on file.    PDMP Review       None            ED Provider  Electronically Signed by             Connor Jenkins DO  02/07/24 7159

## 2024-02-07 NOTE — ED NOTES
Patient repositioned self to right side for comfort.  Pillow placed behind back.       Rhea Dumont RN  02/07/24 2245

## 2024-02-07 NOTE — ED NOTES
Patient is eating ice chips.  Okay with Dr. Jenkins.      Rhea Dumont RN  02/07/24 1021       Rhea Dumont RN  02/07/24 1118

## 2024-02-07 NOTE — H&P
Addendum H and P    Since last being seen she notes no changes in her condition. She remains tired from possibly her afib. She has been taking her Eliquis minimally for the last month. Of note she did have respiratory distress during prior ANA. On exam patient is aaax3. Neck supple. Heart irregular. Lungs CTAB. No edema. Cap refill <2 seconds. Pre op EKG confirming afib with controlled ventricular response. Pre op labs reviewed. Vitals per below. Patient agreeable to ANA DCCV today. Has post op follow up BMP given slight concepción and lasix adjustment recently.     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  She saw Dr. Leavitt in Nov 2023 reporting worsening fatigue through to be related to afib.  Patient had been anticoagulated with Eliquis. She underwent ANA on 11/29/2023 and found to have RADHA thrombus at which time DCCV was aborted. She did have resp distress during ANA per anesthesia and probe was taken out. She is now here in follow up to discuss options. They do want to try to avoid coumadin if possible after discussion with Dr Leavitt he prefers to repeat ANA DCCV after another 6 weeks of anticoagulation.    Past Medical History:  Diagnosis Date  Chronic maxillary sinusitis  Cutaneous candidiasis 4/25/2000  Generalized osteoarthritis  HELICOBACTER PYLORI (H. 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 2008, 10/2013  UPPER GI ENDOSCOPY/EXAM    Family History  Problem Relation Age of Onset  Cancer Mother  ovarian cnacer  Heart Disorder Mother  premature    Social History    Tobacco Use  Smoking status: Never  Smokeless tobacco: Never  Vaping Use  Vaping Use: Never used  Substance Use Topics  Alcohol use: No  Comment: occasional  Drug use: No    Review of patient's allergies indicates:  Allergen Reactions  Statins  myalgias  Amoxicillin Rash  Doubtful  Rash and peeling skin  Levofloxacin  Joint pains  Sulfa Antibiotics  doubtful    Current Outpatient Medications  Medication Sig Dispense Refill  Omeprazole Magnesium (PRILOSEC OTC) 20 MG TBEC Take 2 Tablets by mouth in the morning.  latanoprost (XALATAN) 0.005 % ophthalmic solution daily  Acetaminophen ER (TYLENOL 8 HOUR ARTHRITIS PAIN) 650 MG TBCR Take 1 Tablet by mouth every 8 hours as needed for Fever.  Nystatin 715088 UNIT/GM External Cream Apply topically to affected area 2 times a day. To affacted area for two weeks. 15 g 2  Nystatin 180075 UNIT/GM External Powder (Nystop) Apply topically to affected area 3 times a day. Apply under breasts 15 g 0  Fluticasone Propionate 50 MCG/ACT Nasal Suspension Administer  "into each nostril 2 Sprays in the morning. Administer 2 Sprays into each nostril daily.. 3 Each 5  Venlafaxine HCl 37.5 MG Oral Tablet (Effexor) Take by mouth 1 Tablet in the morning. 90 Tablet 3  Nadolol 20 MG Oral Tablet (Corgard) Take by mouth 1 Tablet in the morning. Changed at [facility]. 90 Tablet 3  Atorvastatin Calcium 20 MG Oral Tablet (Lipitor) Take 1 tablet by mouth once daily (Patient taking differently: Take 4 Tablets by mouth in the morning.) 90 Tablet 3  Enalapril Maleate 10 MG Oral Tablet (Vasotec) Take by mouth 1 Tablet in the morning AND 1 Tablet before bedtime. (Patient taking differently: Take 1 Tablet by mouth in the morning.) 180 Tablet 3  Eliquis 5 MG Oral Tablet Take 1 Tablet by mouth in the morning and 1 Tablet before bedtime.  EQ Aspirin Adult Low Dose 81 MG Oral Tablet Delayed Release Take 1 Tablet by mouth in the morning.  Vitamin D3 25 MCG (1000 UT) Oral Tablet (Vitamin D3) Take 1 Tablet by mouth in the morning.  Senna 8.6 MG Oral Tablet Take 2 Tablets by mouth in the morning and 2 Tablets before bedtime.  Furosemide 20 MG Oral Tablet (Lasix) Take 1 Tablet by mouth in the morning and 1 Tablet before bedtime.  Potassium Chloride Lorena ER 10 MEQ Oral Tablet Extended Release Take 1 Tablet by mouth in the morning and 1 Tablet before bedtime.    No current facility-administered medications for this visit.    ROS:  Constitutional: (-) fever chills sweats or weight loss  Cardiovascular: (-) negative: no chest pain, dyspnea, syncope, or palpitations  Pulmonary: (-) negative: no cough, wheezing, or shortness of breath    PHYSICAL EXAMINATION  /72  Pulse 86  Temp 36.2 °C (97.1 °F) (Infrared )  Resp 18  Ht 1.613 m (5' 3.5\")  Wt 73 kg (161 lb)  LMP (LMP Unknown)  SpO2 96%  BMI 28.07 kg/m²  BSA 1.81 m²  Body mass index is 28.07 kg/m².  Constitutional: no acute distress  HEENT: normal: normocephalic, atraumatic; no masses, tenderness, or adenopathy  Eyes: sclera and conjunctiva " normal  Neck: supple, normal range of motion  CV: Irregularly irregular, no murmur, gallops or rub  Chest: normal respiratory effort, lungs clear to auscultation and percussion  Abdomen: normal: soft, bowel sounds normal, no masses, tenderness or organomegaly  Extremities: no clubbing, cyanosis, or edema, otherwise grossly normal, warm, and dry    Laboratory Data Review:  Latest Reference Range & Units 22 09:27  Triglycerides <=174 mg/dL 173  Cholesterol <200 mg/dL 143  Non-HDL Cholesterol <=159 mg/dL 103  HDL Cholesterol >49 mg/dL 40 (L)  LDL Cholesterol <=129 mg/dL 68  (L): Data is abnormally low    [id number]  My interpretation  Atrial fibrillation, ventricular rate 60 beats per minute    Echo 2023:  Left Ventricle: Left ventricular cavity size is normal. Wall thickness  is moderately increased. The left ventricular ejection fraction is 70% by  visual estimation. Systolic function is hyperdynamic. Wall motion is  normal.  Left Atrium: The atrium is moderately dilated (42-48 mL/m2).  Mitral Valve: There is moderate calcification. There is moderate  annular calcification. There is mild regurgitation. There is no evidence  of stenosis. The mitral valve mean gradient is 4mmHg.  Tricuspid Valve: There is mild regurgitation. The estimated right  ventricular systolic pressure is 35.00 mmHg.  IVC/SVC: The right atrial pressure is estimated at 3.0 mmHg. The  inferior vena cava is normal in size. Respirophasic changes were normal.    Zio 2023:  CONCLUSIONS:  Final Interpretation  Agree with Preliminary Findings  study indication AFib  There were no patient triggered events  Atrial fibrillation was present with a 100% burden and an average ventricular response rate of 67 beats per minute  There was one short asymptomatic four beat run of nonsustained VT    Impression:    Persistent atrial fibrillation, [id number] = 6  - RADHA thrombus 2023, DCCV aborted  - remains in afib currently  - tired,  question symptomatic?  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 Will discuss resp distress with Dr meyer procedure as well  Has been on nadolol for many many years, will continue.  Continue Lipitor.  Pre op labs feb 1st  Fu with Dr harris a few weeks after cardioversion.    Recommended sleep study to assess for obstructive sleep apnea, patient declined.    Nohemi Escalona [facility], PA-C  Cardiology [place] [address]  Lower level, [address]  [place] [address]  Phone: [phone]  Fax: [phone]  12/18/2023

## 2024-02-07 NOTE — EMTALA/ACUTE CARE TRANSFER
[facility]  [address]  [place] [address]  Dept: [phone]      EMTALA TRANSFER CONSENT    NAME Malgorzata Mims                                         1940                              MRN [id number]    I have been informed of my rights regarding examination, treatment, and transfer   by Dr. Connor Jenkins,     Benefits:  (Services for possible pacemaker placement)    Risks: Potential for delay in receiving treatment, Potential deterioration of medical condition, Loss of IV, Increased discomfort during transfer      Consent for Transfer:  I acknowledge that my medical condition has been evaluated and explained to me by the emergency department physician or other qualified medical person and/or my attending physician, who has recommended that I be transferred to the service of  Accepting Physician: Dr. Gan at Accepting Facility Name, City & State : [facility], RADHA Arauz. The above potential benefits of such transfer, the potential risks associated with such transfer, and the probable risks of not being transferred have been explained to me, and I fully understand them.  The doctor has explained that, in my case, the benefits of transfer outweigh the risks.  I agree to be transferred.    I authorize the performance of emergency medical procedures and treatments upon me in both transit and upon arrival at the receiving facility.  Additionally, I authorize the release of any and all medical records to the receiving facility and request they be transported with me, if possible.  I understand that the safest mode of transportation during a medical emergency is an ambulance and that the Hospital advocates the use of this mode of transport. Risks of traveling to the receiving facility by car, including absence of medical control, life sustaining equipment, such as oxygen, and medical personnel has been explained to me and I fully understand them.    KEYANA  CORRECT BOX BELOW)  [ X ]  I consent to the stated transfer and to be transported by ambulance/helicopter.  [  ]  I consent to the stated transfer, but refuse transportation by ambulance and accept full responsibility for my transportation by car.  I understand the risks of non-ambulance transfers and I exonerate the Hospital and its staff from any deterioration in my condition that results from this refusal.    X___________________________________________    DATE  24  TIME__1200______  Signature of patient or legally responsible individual signing on patient behalf           RELATIONSHIP TO PATIENT_Self________________________          Provider Certification    NAME Malgorzata Mims                                        [facility] 1940                              MRN [id number]    A medical screening exam was performed on the above named patient.  Based on the examination:    Condition Necessitating Transfer The primary encounter diagnosis was Bradycardia. Diagnoses of Paroxysmal atrial fibrillation (HCC) and Atrial fibrillation status post cardioversion (HCC) were also pertinent to this visit.    Patient Condition: The patient has been stabilized such that within reasonable medical probability, no material deterioration of the patient condition or the condition of the unborn child(latonya) is likely to result from the transfer    Reason for Transfer: Level of Care needed not available at this facility (Possible pacemaker placement)    Transfer Requirements: Facility [place], [place]   Space available and qualified personnel available for treatment as acknowledged by    Agreed to accept transfer and to provide appropriate medical treatment as acknowledged by       Dr. Gan  Appropriate medical records of the examination and treatment of the patient are provided at the time of transfer   STAFF INITIAL WHEN COMPLETED _______  Transfer will be performed by qualified personnel from    and appropriate  transfer equipment as required, including the use of necessary and appropriate life support measures.    Provider Certification: I have examined the patient and explained the following risks and benefits of being transferred/refusing transfer to the patient/family:  General risk, such as traffic hazards, adverse weather conditions, rough terrain or turbulence, possible failure of equipment (including vehicle or aircraft), or consequences of actions of persons outside the control of the transport personnel, Unanticipated needs of medical equipment and personnel during transport, Risk of worsening condition      Based on these reasonable risks and benefits to the patient and/or the unborn child(latonya), and based upon the information available at the time of the patient’s examination, I certify that the medical benefits reasonably to be expected from the provision of appropriate medical treatments at another medical facility outweigh the increasing risks, if any, to the individual’s medical condition, and in the case of labor to the unborn child, from effecting the transfer.    X____________________________________________ DATE 02/07/24        TIME_1200______      ORIGINAL - SEND TO MEDICAL RECORDS   COPY - SEND WITH PATIENT DURING TRANSFER

## 2024-02-08 LAB
ATRIAL RATE: 0 BPM
ATRIAL RATE: 41 BPM
ATRIAL RATE: 50 BPM
P AXIS: 94 DEGREES
PR INTERVAL: 192 MS
QRS AXIS: 19 DEGREES
QRS AXIS: 21 DEGREES
QRS AXIS: 26 DEGREES
QRSD INTERVAL: 74 MS
QRSD INTERVAL: 76 MS
QRSD INTERVAL: 76 MS
QT INTERVAL: 466 MS
QT INTERVAL: 470 MS
QT INTERVAL: 492 MS
QTC INTERVAL: 370 MS
QTC INTERVAL: 405 MS
QTC INTERVAL: 428 MS
T WAVE AXIS: 72 DEGREES
T WAVE AXIS: 79 DEGREES
T WAVE AXIS: 85 DEGREES
VENTRICULAR RATE: 38 BPM
VENTRICULAR RATE: 41 BPM
VENTRICULAR RATE: 50 BPM

## 2024-02-19 ENCOUNTER — LAB (OUTPATIENT)
Dept: LAB | Facility: CLINIC | Age: 84
End: 2024-02-19
Payer: COMMERCIAL

## 2024-02-19 DIAGNOSIS — R79.89 ELEVATED SERUM CREATININE: ICD-10-CM

## 2024-02-19 LAB
ANION GAP SERPL CALCULATED.3IONS-SCNC: 8 MMOL/L
BUN SERPL-MCNC: 31 MG/DL (ref 5–25)
CALCIUM SERPL-MCNC: 9.9 MG/DL (ref 8.4–10.2)
CHLORIDE SERPL-SCNC: 104 MMOL/L (ref 96–108)
CO2 SERPL-SCNC: 28 MMOL/L (ref 21–32)
CREAT SERPL-MCNC: 1.53 MG/DL (ref 0.6–1.3)
GFR SERPL CREATININE-BSD FRML MDRD: 30 ML/MIN/1.73SQ M
GLUCOSE P FAST SERPL-MCNC: 104 MG/DL (ref 65–99)
POTASSIUM SERPL-SCNC: 4.3 MMOL/L (ref 3.5–5.3)
SODIUM SERPL-SCNC: 140 MMOL/L (ref 135–147)

## 2024-02-19 PROCEDURE — 80048 BASIC METABOLIC PNL TOTAL CA: CPT

## 2024-02-19 PROCEDURE — 36415 COLL VENOUS BLD VENIPUNCTURE: CPT

## 2024-02-21 ENCOUNTER — OFFICE VISIT (OUTPATIENT)
Dept: FAMILY MEDICINE CLINIC | Facility: CLINIC | Age: 84
End: 2024-02-21
Payer: COMMERCIAL

## 2024-02-21 VITALS
BODY MASS INDEX: 28.81 KG/M2 | SYSTOLIC BLOOD PRESSURE: 118 MMHG | HEIGHT: 63 IN | WEIGHT: 162.6 LBS | OXYGEN SATURATION: 95 % | HEART RATE: 62 BPM | DIASTOLIC BLOOD PRESSURE: 70 MMHG

## 2024-02-21 DIAGNOSIS — I48.0 PAROXYSMAL ATRIAL FIBRILLATION (HCC): Chronic | ICD-10-CM

## 2024-02-21 DIAGNOSIS — I49.5 SICK SINUS SYNDROME (HCC): Primary | ICD-10-CM

## 2024-02-21 DIAGNOSIS — F41.1 ANXIETY, GENERALIZED: ICD-10-CM

## 2024-02-21 PROCEDURE — 99214 OFFICE O/P EST MOD 30 MIN: CPT | Performed by: FAMILY MEDICINE

## 2024-02-21 NOTE — PROGRESS NOTES
"Assessment/Plan:       1. Sick sinus syndrome (HCC)  Assessment & Plan:  S/p pacer  Continue meds and cardiology f/u      2. Paroxysmal atrial fibrillation (HCC)  Assessment & Plan:  Continue eliquis and nadolol  Wean off effexor to see if she can take amiodarone      3. Anxiety, generalized  Assessment & Plan:  Wean off effexor            Subjective:      Patient ID: Malgorzata Mims is a 83 y.o. female.    The patient is here for hospital follow-up.  She had a scheduled cardioversion and ANA and then had long pauses.  She was transferred to [facility] for sick sinus syndrome and pacemaker placement.  They want to put her on amiodarone for the atrial fibrillation she is on nadolol which causes fatigue.  She cannot take the amiodarone while she is on the Effexor and I will wean her off of that to every other day and if she does fine stop and then possibly she can go on amiodarone.  I reviewed her hospital records and her medications and blood work.  Her renal function has improved.        The following portions of the patient's history were reviewed and updated as appropriate: allergies, current medications, past family history, past medical history, past social history, past surgical history, and problem list.    Review of Systems      Objective:      /70 (BP Location: Right arm, Patient Position: Sitting, Cuff Size: Large)   Pulse 62   Ht 5' 3\" (1.6 m)   Wt 73.8 kg (162 lb 9.6 oz)   SpO2 95%   BMI 28.80 kg/m²          Physical Exam    "

## 2024-03-02 DIAGNOSIS — K59.09 OTHER CONSTIPATION: ICD-10-CM

## 2024-03-03 RX ORDER — SENNOSIDES 8.6 MG
17.2 TABLET ORAL 2 TIMES DAILY
Qty: 120 TABLET | Refills: 5 | Status: SHIPPED | OUTPATIENT
Start: 2024-03-03

## 2024-03-06 DIAGNOSIS — J32.9 SINUSITIS, UNSPECIFIED CHRONICITY, UNSPECIFIED LOCATION: Primary | ICD-10-CM

## 2024-03-06 RX ORDER — AZITHROMYCIN 250 MG/1
TABLET, FILM COATED ORAL
Qty: 6 TABLET | Refills: 0 | Status: SHIPPED | OUTPATIENT
Start: 2024-03-06 | End: 2024-03-10

## 2024-03-26 DIAGNOSIS — N18.4 CHRONIC RENAL DISEASE, STAGE IV (HCC): Primary | ICD-10-CM

## 2024-03-27 ENCOUNTER — TELEPHONE (OUTPATIENT)
Dept: NEPHROLOGY | Facility: CLINIC | Age: 84
End: 2024-03-27

## 2024-03-27 NOTE — TELEPHONE ENCOUNTER
Called and spoke to patient's daughter/caretaker.  Will have lab/urine studies done for upcoming appt.

## 2024-03-29 ENCOUNTER — APPOINTMENT (OUTPATIENT)
Dept: LAB | Facility: CLINIC | Age: 84
End: 2024-03-29
Payer: COMMERCIAL

## 2024-03-29 DIAGNOSIS — N18.4 CHRONIC RENAL DISEASE, STAGE IV (HCC): ICD-10-CM

## 2024-03-29 LAB
ANION GAP SERPL CALCULATED.3IONS-SCNC: 9 MMOL/L (ref 4–13)
BACTERIA UR QL AUTO: ABNORMAL /HPF
BASOPHILS # BLD AUTO: 0.08 THOUSANDS/ÂΜL (ref 0–0.1)
BASOPHILS NFR BLD AUTO: 1 % (ref 0–1)
BILIRUB UR QL STRIP: NEGATIVE
BUN SERPL-MCNC: 32 MG/DL (ref 5–25)
CALCIUM SERPL-MCNC: 9.8 MG/DL (ref 8.4–10.2)
CHLORIDE SERPL-SCNC: 104 MMOL/L (ref 96–108)
CLARITY UR: ABNORMAL
CO2 SERPL-SCNC: 29 MMOL/L (ref 21–32)
COLOR UR: YELLOW
CREAT SERPL-MCNC: 1.4 MG/DL (ref 0.6–1.3)
CREAT UR-MCNC: 128.4 MG/DL
EOSINOPHIL # BLD AUTO: 0.4 THOUSAND/ÂΜL (ref 0–0.61)
EOSINOPHIL NFR BLD AUTO: 5 % (ref 0–6)
ERYTHROCYTE [DISTWIDTH] IN BLOOD BY AUTOMATED COUNT: 13.6 % (ref 11.6–15.1)
GFR SERPL CREATININE-BSD FRML MDRD: 34 ML/MIN/1.73SQ M
GLUCOSE P FAST SERPL-MCNC: 108 MG/DL (ref 65–99)
GLUCOSE UR STRIP-MCNC: NEGATIVE MG/DL
HCT VFR BLD AUTO: 36 % (ref 34.8–46.1)
HGB BLD-MCNC: 11 G/DL (ref 11.5–15.4)
HGB UR QL STRIP.AUTO: NEGATIVE
HYALINE CASTS #/AREA URNS LPF: ABNORMAL /LPF
IMM GRANULOCYTES # BLD AUTO: 0.06 THOUSAND/UL (ref 0–0.2)
IMM GRANULOCYTES NFR BLD AUTO: 1 % (ref 0–2)
KETONES UR STRIP-MCNC: NEGATIVE MG/DL
LEUKOCYTE ESTERASE UR QL STRIP: ABNORMAL
LYMPHOCYTES # BLD AUTO: 2.69 THOUSANDS/ÂΜL (ref 0.6–4.47)
LYMPHOCYTES NFR BLD AUTO: 34 % (ref 14–44)
MCH RBC QN AUTO: 29.1 PG (ref 26.8–34.3)
MCHC RBC AUTO-ENTMCNC: 30.6 G/DL (ref 31.4–37.4)
MCV RBC AUTO: 95 FL (ref 82–98)
MONOCYTES # BLD AUTO: 0.85 THOUSAND/ÂΜL (ref 0.17–1.22)
MONOCYTES NFR BLD AUTO: 11 % (ref 4–12)
NEUTROPHILS # BLD AUTO: 3.9 THOUSANDS/ÂΜL (ref 1.85–7.62)
NEUTS SEG NFR BLD AUTO: 48 % (ref 43–75)
NITRITE UR QL STRIP: NEGATIVE
NON-SQ EPI CELLS URNS QL MICRO: ABNORMAL /HPF
NRBC BLD AUTO-RTO: 0 /100 WBCS
PH UR STRIP.AUTO: 6 [PH]
PLATELET # BLD AUTO: 268 THOUSANDS/UL (ref 149–390)
PMV BLD AUTO: 11.5 FL (ref 8.9–12.7)
POTASSIUM SERPL-SCNC: 4.8 MMOL/L (ref 3.5–5.3)
PROT UR STRIP-MCNC: ABNORMAL MG/DL
PROT UR-MCNC: 15 MG/DL
PROT/CREAT UR: 0.12 MG/G{CREAT} (ref 0–0.1)
RBC # BLD AUTO: 3.78 MILLION/UL (ref 3.81–5.12)
RBC #/AREA URNS AUTO: ABNORMAL /HPF
SODIUM SERPL-SCNC: 142 MMOL/L (ref 135–147)
SP GR UR STRIP.AUTO: 1.02 (ref 1–1.03)
UROBILINOGEN UR STRIP-ACNC: <2 MG/DL
WBC # BLD AUTO: 7.98 THOUSAND/UL (ref 4.31–10.16)
WBC #/AREA URNS AUTO: ABNORMAL /HPF

## 2024-03-29 PROCEDURE — 36415 COLL VENOUS BLD VENIPUNCTURE: CPT

## 2024-03-29 PROCEDURE — 84156 ASSAY OF PROTEIN URINE: CPT

## 2024-03-29 PROCEDURE — 82570 ASSAY OF URINE CREATININE: CPT

## 2024-03-29 PROCEDURE — 81001 URINALYSIS AUTO W/SCOPE: CPT

## 2024-03-29 PROCEDURE — 85025 COMPLETE CBC W/AUTO DIFF WBC: CPT

## 2024-03-29 PROCEDURE — 80048 BASIC METABOLIC PNL TOTAL CA: CPT

## 2024-04-01 ENCOUNTER — TELEPHONE (OUTPATIENT)
Dept: NEPHROLOGY | Facility: CLINIC | Age: 84
End: 2024-04-01

## 2024-04-01 NOTE — TELEPHONE ENCOUNTER
Daughter called back was given these results  has appt tomorrow at 3pm.  Pt not having any signs or symptoms of UTI

## 2024-04-01 NOTE — TELEPHONE ENCOUNTER
I called and left a message for Devi to call back regarding patient's lab results.     ----- Message from MARTIN Peoples sent at 4/1/2024  8:46 AM EDT -----  Follows with Dr. Bangura. Kidney function is stable. Urine potentially infected- ask if any s/s UTI. Hemoglobin relatively unchanged from 1 month ago. To be reviewed in detail at upcoming appt

## 2024-04-02 ENCOUNTER — CONSULT (OUTPATIENT)
Dept: NEPHROLOGY | Facility: CLINIC | Age: 84
End: 2024-04-02
Payer: COMMERCIAL

## 2024-04-02 VITALS
OXYGEN SATURATION: 91 % | HEIGHT: 64 IN | WEIGHT: 164.6 LBS | HEART RATE: 70 BPM | DIASTOLIC BLOOD PRESSURE: 70 MMHG | BODY MASS INDEX: 28.1 KG/M2 | SYSTOLIC BLOOD PRESSURE: 126 MMHG

## 2024-04-02 DIAGNOSIS — E55.9 VITAMIN D DEFICIENCY: ICD-10-CM

## 2024-04-02 DIAGNOSIS — M15.9 PRIMARY OSTEOARTHRITIS INVOLVING MULTIPLE JOINTS: ICD-10-CM

## 2024-04-02 DIAGNOSIS — N18.32 STAGE 3B CHRONIC KIDNEY DISEASE (HCC): Primary | ICD-10-CM

## 2024-04-02 DIAGNOSIS — N18.4 CKD (CHRONIC KIDNEY DISEASE) STAGE 4, GFR 15-29 ML/MIN (HCC): ICD-10-CM

## 2024-04-02 DIAGNOSIS — I48.20 CHRONIC ATRIAL FIBRILLATION (HCC): ICD-10-CM

## 2024-04-02 PROBLEM — N17.9 AKI (ACUTE KIDNEY INJURY) (HCC): Status: RESOLVED | Noted: 2021-05-03 | Resolved: 2024-04-02

## 2024-04-02 PROCEDURE — 99204 OFFICE O/P NEW MOD 45 MIN: CPT | Performed by: INTERNAL MEDICINE

## 2024-04-02 RX ORDER — VENLAFAXINE HYDROCHLORIDE 37.5 MG/1
37.5 CAPSULE, EXTENDED RELEASE ORAL DAILY
COMMUNITY
Start: 2024-03-29

## 2024-04-02 RX ORDER — FUROSEMIDE 20 MG/1
20 TABLET ORAL DAILY
Start: 2024-04-02

## 2024-04-02 NOTE — PROGRESS NOTES
[facility]  Alfonzo Mandujano DO    Name: Malgorzata Mims  YOB: 1940      Assessment/Plan:    Chronic renal disease, stage III (HCC)  Lab Results   Component Value Date    EGFR 34 03/29/2024    EGFR 30 02/19/2024    EGFR 25 02/07/2024    CREATININE 1.40 (H) 03/29/2024    CREATININE 1.53 (H) 02/19/2024    CREATININE 1.77 (H) 02/07/2024     Creatinine is back to typical baseline of around 1.4 mg/dL.  Etiology is unclear at this time, however, thankfully kidney function has been mostly stable over the last 4-5 years.  This bodes well for longevity of kidney function, so long as we can avoid nephrotoxins going forward.  We will need to confirm that there are no significant anatomic issues, specifically review of CAT scan by my evaluation noted potential thinning of cortex in the left kidney, we will further evaluate this with a kidney ultrasound.  Continue to avoid NSAIDs.  Of note, the patient carries a diagnosis of NSAID induced nephropathy from 2017.    Gastroesophageal reflux disease without esophagitis  Patient takes omeprazole, she may continue this, however, long-term if able to tolerate we we will look to have her only take an H2 blocker due to potential long-term issues with chronic interstitial nephritis.  Discontinuation of NSAIDs will also be helpful here.    Osteoarthritis  Continue to avoid all NSAIDs, focus on management with Tylenol and physical therapy/care.    Vitamin D deficiency  Follow-up vitamin D level, continue with current supplement which is 1000 units daily.  Will increase dosing as indicated.         Problem List Items Addressed This Visit          Cardiovascular and Mediastinum    Atrial fibrillation (HCC) (Chronic)    Relevant Medications    furosemide (LASIX) 20 mg tablet       Musculoskeletal and Integument    Osteoarthritis     Continue to avoid all NSAIDs, focus on management with Tylenol and physical therapy/care.             Genitourinary    Chronic renal disease, stage III (HCC) - Primary     Lab Results   Component Value Date    EGFR 34 03/29/2024    EGFR 30 02/19/2024    EGFR 25 02/07/2024    CREATININE 1.40 (H) 03/29/2024    CREATININE 1.53 (H) 02/19/2024    CREATININE 1.77 (H) 02/07/2024     Creatinine is back to typical baseline of around 1.4 mg/dL.  Etiology is unclear at this time, however, thankfully kidney function has been mostly stable over the last 4-5 years.  This bodes well for longevity of kidney function, so long as we can avoid nephrotoxins going forward.  We will need to confirm that there are no significant anatomic issues, specifically review of CAT scan by my evaluation noted potential thinning of cortex in the left kidney, we will further evaluate this with a kidney ultrasound.  Continue to avoid NSAIDs.  Of note, the patient carries a diagnosis of NSAID induced nephropathy from 2017.         Relevant Medications    furosemide (LASIX) 20 mg tablet    Other Relevant Orders    US kidney and bladder    Magnesium    Phosphorus    PTH, intact       Other    Vitamin D deficiency     Follow-up vitamin D level, continue with current supplement which is 1000 units daily.  Will increase dosing as indicated.         Relevant Orders    Vitamin D 25 hydroxy     Other Visit Diagnoses       CKD (chronic kidney disease) stage 4, GFR 15-29 ml/min ([facility])        Relevant Medications    furosemide (LASIX) 20 mg tablet            Thank you for allowing us to participate in the care of your patient.  Will need to check a kidney ultrasound to further evaluate anatomy, there may be underlying renovascular disease on the left side.  Continue to avoid potential nephrotoxins including NSAIDs which the patient has taken heavily in the past.  Thankfully in general, the patient's kidney function has been between 30-35 mL/min, although at times it is less than this and at times it is greater than this.  Will see her back for regular appointment  in 6 months, sooner if clinically indicated.    Subjective:      Patient ID: Malgorzata Mims is a 83 y.o. female.    Patient presents for initial evaluation.    We reviewed labs in detail, most recent creatinine noted to be 1.4 mg/dL, estimate GFR 34 mL/min.  Patient appears to have had an estimate GFR between 30-35 mL/min, on average, over the last 4-5 years.    There were no significant electrolyte abnormalities noted.  Patient is taking all medications as prescribed with no specific side effects and denies use of nonsteroid anti-inflammatory medications.    Reviewed CT scan of chest imaging myself which noted 2 kidneys however, left kidney potentially look to be smaller and potentially smaller cortical border.          The following portions of the patient's history were reviewed and updated as appropriate: allergies, current medications, past family history, past medical history, past social history, past surgical history and problem list.    Review of Systems   All other systems reviewed and are negative.        Social History     Socioeconomic History    Marital status:      Spouse name: None    Number of children: None    Years of education: None    Highest education level: None   Occupational History    None   Tobacco Use    Smoking status: Never    Smokeless tobacco: Never   Vaping Use    Vaping status: Never Used   Substance and Sexual Activity    Alcohol use: Not Currently    Drug use: Never    Sexual activity: Not Currently   Other Topics Concern    None   Social History Narrative    None     Social Determinants of Health     Financial Resource Strain: Low Risk  (5/31/2023)    Overall Financial Resource Strain (CARDIA)     Difficulty of Paying Living Expenses: Not hard at all   Food Insecurity: No Food Insecurity (8/17/2023)    Received from [facility]    Hunger Vital Sign     Worried About Running Out of Food in the Last Year: Never true     Ran Out of Food in the Last Year: Never true    Transportation Needs: No Transportation Needs (8/14/2023)    [facility]     Lack of Transportation (Medical): No     Lack of Transportation (Non-Medical): No   Physical Activity: Not on file   Stress: Not on file   Social Connections: Not on file   Intimate Partner Violence: Not on file   Housing Stability: Not on file     Past Medical History:   Diagnosis Date    Afib (HCC)     SUSANA (acute kidney injury) ([facility]) 05/03/2021    Arthritis     Gastro-esophageal reflux disease with esophagitis 11/30/2022    GERD (gastroesophageal reflux disease)     Hyperlipidemia     Hypertension     Stroke (HCC)      Past Surgical History:   Procedure Laterality Date    CARDIAC PACEMAKER PLACEMENT Left 02/09/2024    NO PAST SURGERIES      SINUS SURGERY         Current Outpatient Medications:     acetaminophen (TYLENOL) 650 mg CR tablet, Take 650 mg by mouth, Disp: , Rfl:     aspirin (ECOTRIN LOW STRENGTH) 81 mg EC tablet, Take 1 tablet (81 mg total) by mouth daily, Disp: 90 tablet, Rfl: 1    atorvastatin (LIPITOR) 20 mg tablet, Take 4 tablets (80 mg total) by mouth daily, Disp: 360 tablet, Rfl: 1    cholecalciferol (VITAMIN D3) 1,000 units tablet, Take 1 tablet by mouth once daily, Disp: 30 tablet, Rfl: 5    Eliquis 5 MG, Take 1 tablet by mouth twice daily, Disp: 60 tablet, Rfl: 5    fluticasone (FLONASE) 50 mcg/act nasal spray, 1 spray into each nostril daily, Disp: , Rfl:     furosemide (LASIX) 20 mg tablet, Take 1 tablet (20 mg total) by mouth daily Take this once a day, Disp: , Rfl:     latanoprost (XALATAN) 0.005 % ophthalmic solution, INSTILL 1 DROP INTO EACH EYE IN THE EVENING, Disp: , Rfl:     nadolol (CORGARD) 20 mg tablet, Take 1 tablet (20 mg total) by mouth daily, Disp: 90 tablet, Rfl: 3    omeprazole (PriLOSEC) 40 MG capsule, Take 1 capsule (40 mg total) by mouth daily, Disp: 90 capsule, Rfl: 3    senna (SENOKOT) 8.6 mg, Take 2 tablets by mouth twice daily, Disp: 120 tablet,  "Rfl: 5    venlafaxine (EFFEXOR-XR) 37.5 mg 24 hr capsule, Take 37.5 mg by mouth daily, Disp: , Rfl:      Lab Results   Component Value Date    SODIUM 142 03/29/2024    K 4.8 03/29/2024     03/29/2024    CO2 29 03/29/2024    AGAP 9 03/29/2024    BUN 32 (H) 03/29/2024    CREATININE 1.40 (H) 03/29/2024    GLUC 111 02/07/2024    GLUF 108 (H) 03/29/2024    CALCIUM 9.8 03/29/2024    AST 12 (L) 02/07/2024    ALT 8 02/07/2024    ALKPHOS 63 02/07/2024    TP 6.9 02/07/2024    TBILI 0.53 02/07/2024    EGFR 34 03/29/2024     Lab Results   Component Value Date    WBC 7.98 03/29/2024    HGB 11.0 (L) 03/29/2024    HCT 36.0 03/29/2024    MCV 95 03/29/2024     03/29/2024     Lab Results   Component Value Date    CHOLESTEROL 161 05/23/2023     Lab Results   Component Value Date    HDL 47 (L) 05/23/2023     Lab Results   Component Value Date    LDLCALC 69 05/23/2023     Lab Results   Component Value Date    TRIG 223 (H) 05/23/2023     No results found for: \"CHOLHDL\"  Lab Results   Component Value Date    [id number] 2.243 11/22/2023           Objective:      /70 (BP Location: Left arm, Patient Position: Sitting)   Pulse 70   Ht 5' 3.5\" (1.613 m)   Wt 74.7 kg (164 lb 9.6 oz)   SpO2 91%   BMI 28.70 kg/m²          Physical Exam  Vitals reviewed.   Constitutional:       General: She is not in acute distress.     Appearance: Normal appearance.   HENT:      Head: Normocephalic and atraumatic.      Right Ear: External ear normal.      Left Ear: External ear normal.   Eyes:      Conjunctiva/sclera: Conjunctivae normal.   Cardiovascular:      Rate and Rhythm: Normal rate and regular rhythm.      Heart sounds: Normal heart sounds.   Pulmonary:      Effort: No respiratory distress.      Breath sounds: No wheezing.   Abdominal:      Palpations: Abdomen is soft.   Skin:     General: Skin is warm and dry.   Neurological:      General: No focal deficit present.      Mental Status: She is alert and oriented to person, place, " and time.   Psychiatric:         Mood and Affect: Mood normal.         Behavior: Behavior normal.

## 2024-04-03 PROBLEM — K21.00 GASTRO-ESOPHAGEAL REFLUX DISEASE WITH ESOPHAGITIS: Status: RESOLVED | Noted: 2022-11-30 | Resolved: 2024-04-03

## 2024-04-03 NOTE — ASSESSMENT & PLAN NOTE
Follow-up vitamin D level, continue with current supplement which is 1000 units daily.  Will increase dosing as indicated.

## 2024-04-03 NOTE — ASSESSMENT & PLAN NOTE
Patient takes omeprazole, she may continue this, however, long-term if able to tolerate we we will look to have her only take an H2 blocker due to potential long-term issues with chronic interstitial nephritis.  Discontinuation of NSAIDs will also be helpful here.

## 2024-04-03 NOTE — ASSESSMENT & PLAN NOTE
Lab Results   Component Value Date    EGFR 34 03/29/2024    EGFR 30 02/19/2024    EGFR 25 02/07/2024    CREATININE 1.40 (H) 03/29/2024    CREATININE 1.53 (H) 02/19/2024    CREATININE 1.77 (H) 02/07/2024     Creatinine is back to typical baseline of around 1.4 mg/dL.  Etiology is unclear at this time, however, thankfully kidney function has been mostly stable over the last 4-5 years.  This bodes well for longevity of kidney function, so long as we can avoid nephrotoxins going forward.  We will need to confirm that there are no significant anatomic issues, specifically review of CAT scan by my evaluation noted potential thinning of cortex in the left kidney, we will further evaluate this with a kidney ultrasound.  Continue to avoid NSAIDs.  Of note, the patient carries a diagnosis of NSAID induced nephropathy from 2017.

## 2024-04-08 ENCOUNTER — HOSPITAL ENCOUNTER (OUTPATIENT)
Dept: ULTRASOUND IMAGING | Facility: HOSPITAL | Age: 84
Discharge: HOME | End: 2024-04-08
Attending: INTERNAL MEDICINE
Payer: COMMERCIAL

## 2024-04-08 DIAGNOSIS — N18.32 STAGE 3B CHRONIC KIDNEY DISEASE (HCC): ICD-10-CM

## 2024-04-08 PROCEDURE — 76775 US EXAM ABDO BACK WALL LIM: CPT

## 2024-04-12 ENCOUNTER — APPOINTMENT (OUTPATIENT)
Dept: LAB | Facility: CLINIC | Age: 84
End: 2024-04-12
Payer: COMMERCIAL

## 2024-04-12 DIAGNOSIS — N18.32 STAGE 3B CHRONIC KIDNEY DISEASE (HCC): ICD-10-CM

## 2024-04-12 DIAGNOSIS — E55.9 VITAMIN D DEFICIENCY: ICD-10-CM

## 2024-04-12 LAB
25(OH)D3 SERPL-MCNC: 29.3 NG/ML (ref 30–100)
MAGNESIUM SERPL-MCNC: 1.9 MG/DL (ref 1.9–2.7)
PHOSPHATE SERPL-MCNC: 3.8 MG/DL (ref 2.3–4.1)
PTH-INTACT SERPL-MCNC: 118.3 PG/ML (ref 12–88)

## 2024-04-12 PROCEDURE — 84100 ASSAY OF PHOSPHORUS: CPT

## 2024-04-12 PROCEDURE — 83735 ASSAY OF MAGNESIUM: CPT

## 2024-04-12 PROCEDURE — 83970 ASSAY OF PARATHORMONE: CPT

## 2024-04-12 PROCEDURE — 36415 COLL VENOUS BLD VENIPUNCTURE: CPT

## 2024-04-12 PROCEDURE — 82306 VITAMIN D 25 HYDROXY: CPT

## 2024-04-15 ENCOUNTER — TELEPHONE (OUTPATIENT)
Dept: NEPHROLOGY | Facility: CLINIC | Age: 84
End: 2024-04-15

## 2024-04-15 NOTE — TELEPHONE ENCOUNTER
Pt daughter called back she understood the instructions for the labwork.  She did however ask for the u/s kidney bladder results. Please call her back

## 2024-04-15 NOTE — TELEPHONE ENCOUNTER
I called patient's daughter Devi and left a message for her to call back to obtain results.       ----- Message from MARTIN Shearer sent at 4/15/2024 12:36 PM EDT -----  Please call patient to advise her lab work is overall stable.  Her vitamin D level has improved from 15 to 29 ng/mL however this is still a little low and she should continue to take her vitamin D supplement.  Her phosphorus and magnesium levels are within normal range.  Her PTH is somewhat elevated but not significantly enough to require medication.  Continuing taking her vitamin D will also help the PTH level.  Thank you.

## 2024-04-16 NOTE — TELEPHONE ENCOUNTER
Malgorzata is aware the US is not resulted yet and she will be contacted as soon as we have results.

## 2024-04-17 ENCOUNTER — TELEPHONE (OUTPATIENT)
Dept: NEPHROLOGY | Facility: CLINIC | Age: 84
End: 2024-04-17

## 2024-04-17 NOTE — TELEPHONE ENCOUNTER
Patients daughter called- I read her the note from Vaishnavi. She verbalized understanding, no further actions needed.

## 2024-04-17 NOTE — TELEPHONE ENCOUNTER
I called and left a message for patient to call back regarding U/S results.         ----- Message from MARTIN Shearer sent at 4/17/2024  2:49 PM EDT -----  Please advise patient that renal ultrasound indicates no stones in her kidneys.  She does have a benign simple cyst in her left kidney, but this is not concerning.  Thank you.

## 2024-05-06 ENCOUNTER — PATIENT MESSAGE (OUTPATIENT)
Dept: FAMILY MEDICINE CLINIC | Facility: CLINIC | Age: 84
End: 2024-05-06

## 2024-05-06 DIAGNOSIS — J12.82 PNEUMONIA DUE TO COVID-19 VIRUS: ICD-10-CM

## 2024-05-06 DIAGNOSIS — U07.1 PNEUMONIA DUE TO COVID-19 VIRUS: ICD-10-CM

## 2024-05-07 ENCOUNTER — TELEPHONE (OUTPATIENT)
Age: 84
End: 2024-05-07

## 2024-05-07 RX ORDER — NADOLOL 20 MG/1
20 TABLET ORAL DAILY
Qty: 90 TABLET | Refills: 1 | Status: SHIPPED | OUTPATIENT
Start: 2024-05-07

## 2024-05-07 NOTE — TELEPHONE ENCOUNTER
Appointment scheduled with provider.    Reason: Office Visit    Symptoms: Right hand knuckle joint painful and swollen     Provider: Dr. Robert Budinetz    Date/Time:Wednesday 5/8/2024 at 5:00 pm

## 2024-05-08 ENCOUNTER — OFFICE VISIT (OUTPATIENT)
Dept: FAMILY MEDICINE CLINIC | Facility: CLINIC | Age: 84
End: 2024-05-08
Payer: COMMERCIAL

## 2024-05-08 VITALS
OXYGEN SATURATION: 98 % | HEIGHT: 64 IN | BODY MASS INDEX: 27.66 KG/M2 | WEIGHT: 162 LBS | SYSTOLIC BLOOD PRESSURE: 118 MMHG | HEART RATE: 97 BPM | DIASTOLIC BLOOD PRESSURE: 68 MMHG

## 2024-05-08 DIAGNOSIS — M10.9 ACUTE GOUT, UNSPECIFIED CAUSE, UNSPECIFIED SITE: Primary | ICD-10-CM

## 2024-05-08 PROCEDURE — 99214 OFFICE O/P EST MOD 30 MIN: CPT | Performed by: FAMILY MEDICINE

## 2024-05-08 PROCEDURE — G2211 COMPLEX E/M VISIT ADD ON: HCPCS | Performed by: FAMILY MEDICINE

## 2024-05-08 RX ORDER — ALLOPURINOL 100 MG/1
100 TABLET ORAL DAILY
Qty: 30 TABLET | Refills: 5 | Status: SHIPPED | OUTPATIENT
Start: 2024-05-08

## 2024-05-08 RX ORDER — COLCHICINE 0.6 MG/1
0.6 TABLET ORAL DAILY
Qty: 30 TABLET | Refills: 3 | Status: SHIPPED | OUTPATIENT
Start: 2024-05-08

## 2024-05-08 NOTE — PROGRESS NOTES
"Assessment/Plan:       1. Acute gout, unspecified cause, unspecified site  Comments:  start renal dose allopurinol/colcrys  check uric acid  Orders:  -     Uric acid; Future  -     colchicine (COLCRYS) 0.6 mg tablet; Take 1 tablet (0.6 mg total) by mouth daily  -     allopurinol (ZYLOPRIM) 100 mg tablet; Take 1 tablet (100 mg total) by mouth daily          Subjective:      Patient ID: Malgorzata Mims is a 83 y.o. female.    Malgorzata has pain in her right 4th finger and a hx of gout.  No recent gout attack but a hx of it.  No recent dietary exposures.  Last uric acid in the chart was in 2019 and over 8.5.  now her ankles hurt and are swollen b/l.        The following portions of the patient's history were reviewed and updated as appropriate: allergies, current medications, past family history, past medical history, past social history, past surgical history, and problem list.    Review of Systems   Respiratory: Negative.     Cardiovascular: Negative.    Gastrointestinal: Negative.          Objective:      /68   Pulse 97   Ht 5' 3.5\" (1.613 m)   Wt 73.5 kg (162 lb)   SpO2 98%   BMI 28.25 kg/m²          Physical Exam  Musculoskeletal:      Comments: Right 4th dip joint with obvious gout  Local erythema and crystals         "

## 2024-05-17 ENCOUNTER — LAB (OUTPATIENT)
Dept: LAB | Facility: CLINIC | Age: 84
End: 2024-05-17
Payer: COMMERCIAL

## 2024-05-17 DIAGNOSIS — I48.0 PAROXYSMAL ATRIAL FIBRILLATION (HCC): ICD-10-CM

## 2024-05-17 DIAGNOSIS — M10.9 ACUTE GOUT, UNSPECIFIED CAUSE, UNSPECIFIED SITE: ICD-10-CM

## 2024-05-17 DIAGNOSIS — N28.9 RENAL INSUFFICIENCY: ICD-10-CM

## 2024-05-17 LAB
ANION GAP SERPL CALCULATED.3IONS-SCNC: 11 MMOL/L (ref 4–13)
BASOPHILS # BLD AUTO: 0.07 THOUSANDS/ÂΜL (ref 0–0.1)
BASOPHILS NFR BLD AUTO: 1 % (ref 0–1)
BUN SERPL-MCNC: 30 MG/DL (ref 5–25)
CALCIUM SERPL-MCNC: 9.9 MG/DL (ref 8.4–10.2)
CHLORIDE SERPL-SCNC: 104 MMOL/L (ref 96–108)
CO2 SERPL-SCNC: 26 MMOL/L (ref 21–32)
CREAT SERPL-MCNC: 1.42 MG/DL (ref 0.6–1.3)
EOSINOPHIL # BLD AUTO: 0.33 THOUSAND/ÂΜL (ref 0–0.61)
EOSINOPHIL NFR BLD AUTO: 6 % (ref 0–6)
ERYTHROCYTE [DISTWIDTH] IN BLOOD BY AUTOMATED COUNT: 13.4 % (ref 11.6–15.1)
GFR SERPL CREATININE-BSD FRML MDRD: 33 ML/MIN/1.73SQ M
GLUCOSE P FAST SERPL-MCNC: 112 MG/DL (ref 65–99)
HCT VFR BLD AUTO: 38.1 % (ref 34.8–46.1)
HGB BLD-MCNC: 11.6 G/DL (ref 11.5–15.4)
IMM GRANULOCYTES # BLD AUTO: 0.03 THOUSAND/UL (ref 0–0.2)
IMM GRANULOCYTES NFR BLD AUTO: 1 % (ref 0–2)
LYMPHOCYTES # BLD AUTO: 2.08 THOUSANDS/ÂΜL (ref 0.6–4.47)
LYMPHOCYTES NFR BLD AUTO: 37 % (ref 14–44)
MAGNESIUM SERPL-MCNC: 1.8 MG/DL (ref 1.9–2.7)
MCH RBC QN AUTO: 28.3 PG (ref 26.8–34.3)
MCHC RBC AUTO-ENTMCNC: 30.4 G/DL (ref 31.4–37.4)
MCV RBC AUTO: 93 FL (ref 82–98)
MONOCYTES # BLD AUTO: 0.71 THOUSAND/ÂΜL (ref 0.17–1.22)
MONOCYTES NFR BLD AUTO: 13 % (ref 4–12)
NEUTROPHILS # BLD AUTO: 2.45 THOUSANDS/ÂΜL (ref 1.85–7.62)
NEUTS SEG NFR BLD AUTO: 42 % (ref 43–75)
NRBC BLD AUTO-RTO: 0 /100 WBCS
PLATELET # BLD AUTO: 291 THOUSANDS/UL (ref 149–390)
PMV BLD AUTO: 11.1 FL (ref 8.9–12.7)
POTASSIUM SERPL-SCNC: 4 MMOL/L (ref 3.5–5.3)
RBC # BLD AUTO: 4.1 MILLION/UL (ref 3.81–5.12)
SODIUM SERPL-SCNC: 141 MMOL/L (ref 135–147)
URATE SERPL-MCNC: 7.3 MG/DL (ref 2–7.5)
WBC # BLD AUTO: 5.67 THOUSAND/UL (ref 4.31–10.16)

## 2024-05-17 PROCEDURE — 80048 BASIC METABOLIC PNL TOTAL CA: CPT

## 2024-05-17 PROCEDURE — 83735 ASSAY OF MAGNESIUM: CPT

## 2024-05-17 PROCEDURE — 84550 ASSAY OF BLOOD/URIC ACID: CPT

## 2024-05-17 PROCEDURE — 85025 COMPLETE CBC W/AUTO DIFF WBC: CPT

## 2024-05-17 PROCEDURE — 36415 COLL VENOUS BLD VENIPUNCTURE: CPT

## 2024-05-23 DIAGNOSIS — E78.2 MIXED HYPERLIPIDEMIA: ICD-10-CM

## 2024-05-24 RX ORDER — ATORVASTATIN CALCIUM 20 MG/1
80 TABLET, FILM COATED ORAL DAILY
Qty: 120 TABLET | Refills: 0 | Status: SHIPPED | OUTPATIENT
Start: 2024-05-24

## 2024-05-28 ENCOUNTER — TELEPHONE (OUTPATIENT)
Dept: FAMILY MEDICINE CLINIC | Facility: CLINIC | Age: 84
End: 2024-05-28

## 2024-05-28 ENCOUNTER — RA CDI HCC (OUTPATIENT)
Dept: OTHER | Facility: HOSPITAL | Age: 84
End: 2024-05-28

## 2024-05-28 DIAGNOSIS — E78.2 MIXED HYPERLIPIDEMIA: Primary | ICD-10-CM

## 2024-05-28 RX ORDER — ATORVASTATIN CALCIUM 80 MG/1
80 TABLET, FILM COATED ORAL DAILY
Qty: 90 TABLET | Refills: 1 | Status: SHIPPED | OUTPATIENT
Start: 2024-05-28

## 2024-05-28 NOTE — TELEPHONE ENCOUNTER
Received fax from pharmacy in regards to patients Atorvastatin Calcium 20 Mg oral  Tablets. Insurance will not cover 4 tablets daily. Please send new RX for Atorvastatin 80mg tablet once daily. Thank you. Please advise.

## 2024-05-30 ENCOUNTER — RA CDI HCC (OUTPATIENT)
Dept: OTHER | Facility: HOSPITAL | Age: 84
End: 2024-05-30

## 2024-06-12 ENCOUNTER — TELEPHONE (OUTPATIENT)
Age: 84
End: 2024-06-12

## 2024-06-12 DIAGNOSIS — M10.9 GOUT, UNSPECIFIED CAUSE, UNSPECIFIED CHRONICITY, UNSPECIFIED SITE: Primary | ICD-10-CM

## 2024-06-12 RX ORDER — PREDNISONE 10 MG/1
TABLET ORAL
Qty: 20 TABLET | Refills: 0 | Status: SHIPPED | OUTPATIENT
Start: 2024-06-12 | End: 2024-06-20

## 2024-07-02 DIAGNOSIS — I10 BENIGN ESSENTIAL HTN: ICD-10-CM

## 2024-07-02 DIAGNOSIS — E78.2 MIXED HYPERLIPIDEMIA: ICD-10-CM

## 2024-07-03 RX ORDER — ATORVASTATIN CALCIUM 20 MG/1
80 TABLET, FILM COATED ORAL DAILY
Qty: 120 TABLET | Refills: 0 | Status: SHIPPED | OUTPATIENT
Start: 2024-07-03

## 2024-07-03 RX ORDER — OMEPRAZOLE 40 MG/1
40 CAPSULE, DELAYED RELEASE ORAL DAILY
Qty: 100 CAPSULE | Refills: 1 | Status: SHIPPED | OUTPATIENT
Start: 2024-07-03

## 2024-07-09 ENCOUNTER — RA CDI HCC (OUTPATIENT)
Dept: OTHER | Facility: HOSPITAL | Age: 84
End: 2024-07-09

## 2024-07-18 ENCOUNTER — OFFICE VISIT (OUTPATIENT)
Dept: FAMILY MEDICINE CLINIC | Facility: CLINIC | Age: 84
End: 2024-07-18
Payer: COMMERCIAL

## 2024-07-18 VITALS
HEART RATE: 69 BPM | OXYGEN SATURATION: 98 % | BODY MASS INDEX: 27.96 KG/M2 | HEIGHT: 64 IN | WEIGHT: 163.8 LBS | DIASTOLIC BLOOD PRESSURE: 68 MMHG | SYSTOLIC BLOOD PRESSURE: 136 MMHG

## 2024-07-18 DIAGNOSIS — M10.9 GOUT, UNSPECIFIED CAUSE, UNSPECIFIED CHRONICITY, UNSPECIFIED SITE: ICD-10-CM

## 2024-07-18 DIAGNOSIS — F33.9 EPISODE OF RECURRENT MAJOR DEPRESSIVE DISORDER, UNSPECIFIED DEPRESSION EPISODE SEVERITY (HCC): ICD-10-CM

## 2024-07-18 DIAGNOSIS — I48.11 LONGSTANDING PERSISTENT ATRIAL FIBRILLATION (HCC): Chronic | ICD-10-CM

## 2024-07-18 DIAGNOSIS — Z00.00 MEDICARE ANNUAL WELLNESS VISIT, SUBSEQUENT: Primary | ICD-10-CM

## 2024-07-18 PROCEDURE — G0439 PPPS, SUBSEQ VISIT: HCPCS | Performed by: FAMILY MEDICINE

## 2024-07-18 PROCEDURE — 93000 ELECTROCARDIOGRAM COMPLETE: CPT | Performed by: FAMILY MEDICINE

## 2024-07-18 NOTE — PROGRESS NOTES
Ambulatory Visit  Name: Malgorzata Mims      : 1940      MRN: [id number]  Encounter Provider: Robert Budinetz, MD  Encounter Date: 2024   Encounter department: [facility] PRIMARY CARE    Assessment & Plan   1. Medicare annual wellness visit, subsequent  Assessment & Plan:  Reviewed age-appropriate health maintenance and preventive care.    2. Gout, unspecified cause, unspecified chronicity, unspecified site  Comments:  continue allopurionl  check labs  Orders:  -     Uric acid; Future  -     Comprehensive metabolic panel; Future  3. Episode of recurrent major depressive disorder, unspecified depression episode severity (HCC)  4. Longstanding persistent atrial fibrillation (HCC)  Assessment & Plan:  Check EKG  Continue BB and eliquis    Orders:  -     POCT ECG      Depression Screening and Follow-up Plan: Patient's depression screening was positive with a PHQ-9 score of 8. Patient assessed for underlying major depression. Brief counseling provided and recommend additional follow-up/re-evaluation next office visit.       Preventive health issues were discussed with patient, and age appropriate screening tests were ordered as noted in patient's After Visit Summary. Personalized health advice and appropriate referrals for health education or preventive services given if needed, as noted in patient's After Visit Summary.    History of Present Illness     The patient is here for an annual Medicare checkup.  She is doing fairly well.  She does have some fatigue and some shortness of breath.  She does follow with cardiology.  She has a pacemaker and atrial fibrillation.  Her mood is stable on Effexor.  She has no active chest pain or shortness of breath her EKG is unchanged today.  No new complaints or concerns otherwise.  She is not falling.  Her sleep is adequate but not the best.  Appetite is fine.  No incontinence.  She does continue to have gout working to check some levels and see if we can  increase her allopurinol.       Patient Care Team:  Robert Budinetz, MD as PCP - General (Family Medicine)  Jaron Noble MD as PCP - PCP-[facility] ([facility])  Robert Budinetz, MD as PCP - PCP-[facility] ([facility])  Alfonzo Mandujano DO (Nephrology)    Review of Systems   Respiratory: Negative.     Cardiovascular: Negative.      Medical History Reviewed by provider this encounter:  Tobacco  Allergies  Meds  Problems  Med Hx  Surg Hx  Fam Hx       Annual Wellness Visit Questionnaire   Malgorzata is here for her Subsequent Wellness visit. Last Medicare Wellness visit information reviewed, patient interviewed, no change since last AWV.     Health Risk Assessment:   Patient rates overall health as good. Patient feels that their physical health rating is same. Patient is satisfied with their life. Eyesight was rated as slightly worse. Hearing was rated as same. Patient feels that their emotional and mental health rating is same. Patients states they are never, rarely angry. Patient states they are always unusually tired/fatigued. Pain experienced in the last 7 days has been none. Patient states that she has experienced no weight loss or gain in last 6 months.     Depression Screening:   PHQ-9 Score: 8      Fall Risk Screening:   In the past year, patient has experienced: history of falling in past year    Number of falls: 1  Injured during fall?: Yes    Feels unsteady when standing or walking?: Yes    Worried about falling?: No      Urinary Incontinence Screening:   Patient has leaked urine accidently in the last six months.     Home Safety:  Patient does not have trouble with stairs inside or outside of their home. Patient has working smoke alarms and has working carbon monoxide detector. Home safety hazards include: none.     Nutrition:   Current diet is Regular.     Medications:   Patient is currently taking over-the-counter supplements. OTC medications include: see medication list. Patient is not able to  manage medications.     Activities of Daily Living (ADLs)/Instrumental Activities of Daily Living (IADLs):   Walk and transfer into and out of bed and chair?: Yes  Dress and groom yourself?: Yes    Bathe or shower yourself?: Yes    Feed yourself? Yes  Do your laundry/housekeeping?: No  Manage your money, pay your bills and track your expenses?: Yes  Make your own meals?: No    Do your own shopping?: No    Previous Hospitalizations:   Any hospitalizations or ED visits within the last 12 months?: Yes    How many hospitalizations have you had in the last year?: 1-2    PREVENTIVE SCREENINGS      Cardiovascular Screening:    General: Screening Not Indicated and History Lipid Disorder      Diabetes Screening:     General: Screening Current      Colorectal Cancer Screening:     General: Screening Not Indicated      Cervical Cancer Screening:    General: Screening Not Indicated      Lung Cancer Screening:     General: Screening Not Indicated    Screening, Brief Intervention, and Referral to Treatment (SBIRT)    Screening  Typical number of drinks in a day: 0  Typical number of drinks in a week: 0  Interpretation: Low risk drinking behavior.    Single Item Drug Screening:  How often have you used an illegal drug (including marijuana) or a prescription medication for non-medical reasons in the past year? never    Single Item Drug Screen Score: 0  Interpretation: Negative screen for possible drug use disorder    Social Determinants of Health     Financial Resource Strain: Low Risk  (8/17/2023)    Received from [employer]    Financial Resource Strain     Do you have any trouble paying for your medications, or do you think you might in the future?: No   Food Insecurity: No Food Insecurity (7/18/2024)    Hunger Vital Sign     Worried About Running Out of Food in the Last Year: Never true     Ran Out of Food in the Last Year: Never true   Transportation Needs: No Transportation Needs (7/18/2024)    PRAPARE - Transportation     Lack  "of Transportation (Medical): No     Lack of Transportation (Non-Medical): No   Housing Stability: Low Risk  (7/18/2024)    Housing Stability Vital Sign     Unable to Pay for Housing in the Last Year: No     Number of Times Moved in the Last Year: 1     Homeless in the Last Year: No   Utilities: Not At Risk (7/18/2024)    [facility] Utilities     Threatened with loss of utilities: No     No results found.    Objective     /68 (BP Location: Left arm, Patient Position: Sitting, Cuff Size: Standard)   Pulse 69   Ht 5' 3.5\" (1.613 m)   Wt 74.3 kg (163 lb 12.8 oz)   SpO2 98%   BMI 28.56 kg/m²     Physical Exam  Vitals and nursing note reviewed.   Constitutional:       General: She is not in acute distress.     Appearance: She is well-developed.   HENT:      Head: Normocephalic and atraumatic.   Eyes:      Conjunctiva/sclera: Conjunctivae normal.   Cardiovascular:      Rate and Rhythm: Normal rate and regular rhythm.      Heart sounds: No murmur heard.  Pulmonary:      Effort: Pulmonary effort is normal. No respiratory distress.      Breath sounds: Normal breath sounds.   Abdominal:      Palpations: Abdomen is soft.      Tenderness: There is no abdominal tenderness.   Musculoskeletal:         General: No swelling.      Cervical back: Neck supple.   Skin:     General: Skin is warm and dry.      Capillary Refill: Capillary refill takes less than 2 seconds.   Neurological:      Mental Status: She is alert.   Psychiatric:         Mood and Affect: Mood normal.           "

## 2024-07-22 ENCOUNTER — APPOINTMENT (OUTPATIENT)
Dept: LAB | Facility: CLINIC | Age: 84
End: 2024-07-22
Payer: COMMERCIAL

## 2024-07-22 DIAGNOSIS — M10.9 GOUT, UNSPECIFIED CAUSE, UNSPECIFIED CHRONICITY, UNSPECIFIED SITE: ICD-10-CM

## 2024-07-22 LAB
ALBUMIN SERPL BCG-MCNC: 3.9 G/DL (ref 3.5–5)
ALP SERPL-CCNC: 76 U/L (ref 34–104)
ALT SERPL W P-5'-P-CCNC: 8 U/L (ref 7–52)
ANION GAP SERPL CALCULATED.3IONS-SCNC: 11 MMOL/L (ref 4–13)
AST SERPL W P-5'-P-CCNC: 14 U/L (ref 13–39)
BILIRUB SERPL-MCNC: 0.67 MG/DL (ref 0.2–1)
BUN SERPL-MCNC: 32 MG/DL (ref 5–25)
CALCIUM SERPL-MCNC: 10.1 MG/DL (ref 8.4–10.2)
CHLORIDE SERPL-SCNC: 103 MMOL/L (ref 96–108)
CO2 SERPL-SCNC: 27 MMOL/L (ref 21–32)
CREAT SERPL-MCNC: 1.23 MG/DL (ref 0.6–1.3)
GFR SERPL CREATININE-BSD FRML MDRD: 39 ML/MIN/1.73SQ M
GLUCOSE P FAST SERPL-MCNC: 116 MG/DL (ref 65–99)
POTASSIUM SERPL-SCNC: 4.7 MMOL/L (ref 3.5–5.3)
PROT SERPL-MCNC: 7.2 G/DL (ref 6.4–8.4)
SODIUM SERPL-SCNC: 141 MMOL/L (ref 135–147)
URATE SERPL-MCNC: 6.5 MG/DL (ref 2–7.5)

## 2024-07-22 PROCEDURE — 84550 ASSAY OF BLOOD/URIC ACID: CPT

## 2024-07-22 PROCEDURE — 36415 COLL VENOUS BLD VENIPUNCTURE: CPT

## 2024-07-22 PROCEDURE — 80053 COMPREHEN METABOLIC PANEL: CPT

## 2024-07-26 ENCOUNTER — PATIENT MESSAGE (OUTPATIENT)
Dept: FAMILY MEDICINE CLINIC | Facility: CLINIC | Age: 84
End: 2024-07-26

## 2024-07-26 DIAGNOSIS — I48.91 NEW ONSET A-FIB (HCC): ICD-10-CM

## 2024-07-26 DIAGNOSIS — E78.2 MIXED HYPERLIPIDEMIA: ICD-10-CM

## 2024-07-26 RX ORDER — MULTIVIT-MIN/IRON/FOLIC ACID/K 18-600-40
1 CAPSULE ORAL DAILY
Qty: 30 TABLET | Refills: 5 | Status: SHIPPED | OUTPATIENT
Start: 2024-07-26

## 2024-07-26 RX ORDER — ATORVASTATIN CALCIUM 20 MG/1
80 TABLET, FILM COATED ORAL DAILY
Qty: 120 TABLET | Refills: 0 | Status: SHIPPED | OUTPATIENT
Start: 2024-07-26 | End: 2024-07-29 | Stop reason: SDUPTHER

## 2024-07-26 RX ORDER — ASPIRIN 81 MG/1
81 TABLET, COATED ORAL DAILY
Qty: 100 TABLET | Refills: 1 | Status: SHIPPED | OUTPATIENT
Start: 2024-07-26

## 2024-07-28 DIAGNOSIS — I48.91 NEW ONSET A-FIB (HCC): ICD-10-CM

## 2024-07-29 DIAGNOSIS — M10.9 GOUT, UNSPECIFIED CAUSE, UNSPECIFIED CHRONICITY, UNSPECIFIED SITE: Primary | ICD-10-CM

## 2024-07-29 DIAGNOSIS — E78.2 MIXED HYPERLIPIDEMIA: ICD-10-CM

## 2024-07-29 RX ORDER — APIXABAN 5 MG/1
5 TABLET, FILM COATED ORAL 2 TIMES DAILY
Qty: 60 TABLET | Refills: 5 | Status: SHIPPED | OUTPATIENT
Start: 2024-07-29

## 2024-07-29 RX ORDER — PREDNISONE 10 MG/1
TABLET ORAL
Qty: 20 TABLET | Refills: 0 | Status: SHIPPED | OUTPATIENT
Start: 2024-07-29 | End: 2024-08-06

## 2024-07-31 DIAGNOSIS — M10.9 ACUTE GOUT, UNSPECIFIED CAUSE, UNSPECIFIED SITE: ICD-10-CM

## 2024-07-31 RX ORDER — COLCHICINE 0.6 MG/1
0.6 TABLET ORAL DAILY
Qty: 30 TABLET | Refills: 3 | Status: SHIPPED | OUTPATIENT
Start: 2024-07-31

## 2024-07-31 RX ORDER — ATORVASTATIN CALCIUM 20 MG/1
80 TABLET, FILM COATED ORAL DAILY
Qty: 120 TABLET | Refills: 0 | Status: SHIPPED | OUTPATIENT
Start: 2024-07-31

## 2024-07-31 NOTE — PATIENT COMMUNICATION
Pt's daughter Devi called requesting to speak with Claire regarding a message she received through pt's portal.    Warm transferred to Claire with clinical to assist.

## 2024-08-14 ENCOUNTER — OFFICE VISIT (OUTPATIENT)
Age: 84
End: 2024-08-14

## 2024-08-14 VITALS
HEART RATE: 64 BPM | SYSTOLIC BLOOD PRESSURE: 138 MMHG | DIASTOLIC BLOOD PRESSURE: 84 MMHG | HEIGHT: 64 IN | OXYGEN SATURATION: 97 % | BODY MASS INDEX: 27.66 KG/M2 | TEMPERATURE: 97.9 F | WEIGHT: 162 LBS

## 2024-08-14 DIAGNOSIS — N14.19 NEPHROPATHY DUE TO NONSTEROIDAL ANTI-INFLAMMATORY DRUG (NSAID): ICD-10-CM

## 2024-08-14 DIAGNOSIS — N25.81 SECONDARY HYPERPARATHYROIDISM OF RENAL ORIGIN (HCC): ICD-10-CM

## 2024-08-14 DIAGNOSIS — E55.9 VITAMIN D DEFICIENCY: ICD-10-CM

## 2024-08-14 DIAGNOSIS — N18.32 STAGE 3B CHRONIC KIDNEY DISEASE (HCC): Primary | ICD-10-CM

## 2024-08-14 DIAGNOSIS — E53.8 VITAMIN B12 DEFICIENCY: ICD-10-CM

## 2024-08-14 DIAGNOSIS — M1A.00X0 IDIOPATHIC CHRONIC GOUT WITHOUT TOPHUS, UNSPECIFIED SITE: ICD-10-CM

## 2024-08-14 DIAGNOSIS — K21.9 GASTROESOPHAGEAL REFLUX DISEASE WITHOUT ESOPHAGITIS: ICD-10-CM

## 2024-08-14 DIAGNOSIS — G62.9 NEUROPATHY: ICD-10-CM

## 2024-08-14 DIAGNOSIS — T39.395A NEPHROPATHY DUE TO NONSTEROIDAL ANTI-INFLAMMATORY DRUG (NSAID): ICD-10-CM

## 2024-08-14 PROBLEM — E87.1 HYPONATREMIA: Status: RESOLVED | Noted: 2021-05-03 | Resolved: 2024-08-14

## 2024-08-14 PROBLEM — E87.5 HYPERKALEMIA: Status: RESOLVED | Noted: 2023-07-05 | Resolved: 2024-08-14

## 2024-08-14 NOTE — ASSESSMENT & PLAN NOTE
Continue to monitor PTH levels to every 3 months for now.  No activated vitamin D agent indicated at this time

## 2024-08-14 NOTE — ASSESSMENT & PLAN NOTE
Continue allopurinol, patient is also trying to maintain a low purine diet.  She is aware of trigger foods.

## 2024-08-14 NOTE — PROGRESS NOTES
[facility]  Alfonzo Mandujano DO    Name: Malgorzata Mims  YOB: 1940      Assessment/Plan:    Chronic renal disease, stage III (HCC)  Lab Results   Component Value Date    EGFR 39 07/22/2024    EGFR 33 05/17/2024    EGFR 34 03/29/2024    CREATININE 1.23 07/22/2024    CREATININE 1.42 (H) 05/17/2024    CREATININE 1.40 (H) 03/29/2024     Kidney function continues to slowly improve, now the creatinine down to 1.2 mg/dL.  Continue to optimize care avoid potential for toxins.    Secondary hyperparathyroidism of renal origin (HCC)  Continue to monitor PTH levels to every 3 months for now.  No activated vitamin D agent indicated at this time    Neuropathy  Patient deferred further treatment at this time and defer to primary care provider.  In the meantime, we will check a vitamin B12 in about 3 months to ensure there is no deficiency.  With respect to treatment, can use gabapentin, however would use caution with total daily dose of more than 1200 mg.    Gastroesophageal reflux disease without esophagitis  Patient continues to require omeprazole at this time, unlikely be able to transition to an H2 blocker.  She is aware that over the course of time this can cause a chronic version of tubulointerstitial nephritis.    Idiopathic chronic gout without tophus  Continue allopurinol, patient is also trying to maintain a low purine diet.  She is aware of trigger foods.    Vitamin D deficiency  Recommend increasing vitamin D supplementation to 5000 units daily.         Problem List Items Addressed This Visit          Digestive    Gastroesophageal reflux disease without esophagitis     Patient continues to require omeprazole at this time, unlikely be able to transition to an H2 blocker.  She is aware that over the course of time this can cause a chronic version of tubulointerstitial nephritis.            Endocrine    Secondary hyperparathyroidism of renal origin (HCC)      Continue to monitor PTH levels to every 3 months for now.  No activated vitamin D agent indicated at this time         Relevant Orders    PTH, intact       Nervous and Auditory    Neuropathy     Patient deferred further treatment at this time and defer to primary care provider.  In the meantime, we will check a vitamin B12 in about 3 months to ensure there is no deficiency.  With respect to treatment, can use gabapentin, however would use caution with total daily dose of more than 1200 mg.            Genitourinary    Chronic renal disease, stage III (HCC) - Primary     Lab Results   Component Value Date    EGFR 39 07/22/2024    EGFR 33 05/17/2024    EGFR 34 03/29/2024    CREATININE 1.23 07/22/2024    CREATININE 1.42 (H) 05/17/2024    CREATININE 1.40 (H) 03/29/2024     Kidney function continues to slowly improve, now the creatinine down to 1.2 mg/dL.  Continue to optimize care avoid potential for toxins.         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  She is aware of trigger foods.            Other    Vitamin D deficiency     Recommend increasing vitamin D supplementation to 5000 units daily.          Other Visit Diagnoses       Vitamin B12 deficiency        Relevant Orders    Vitamin B12            Patient stable and doing well from the renal standpoint.  Recommend checking labs in 3 months to continue to monitor blood work, with next visit in about 6 months.  Additional labs to be done at that time prior to that visit.    Subjective:      Patient ID: Malgorzata Mims is a 84 y.o. female.    Patient presents for follow up.    We  reviewed labs in detail, most recent creatinine noted to be 1.23 mg/dL, which places estimate GFR 39 mL/min.    There were no significant electrolyte abnormalities noted.  Patient is taking all medications as prescribed with no specific side effects and denies use of nonsteroid anti-inflammatory medications.    Patient is complaining of symptoms consistent with neuropathy bilateral hands and feet          The following portions of the patient's history were reviewed and updated as appropriate: allergies, current medications, past family history, past medical history, past social history, past surgical history and problem list.    Review of Systems   All other systems reviewed and are negative.        Social History     Socioeconomic History    Marital status:      Spouse name: None    Number of children: None    Years of education: None    Highest education level: None   Occupational History    None   Tobacco Use    Smoking status: Never     Passive exposure: Never    Smokeless tobacco: Never   Vaping Use    Vaping status: Never Used   Substance and Sexual Activity    Alcohol use: Not Currently    Drug use: Never    Sexual activity: Not Currently   Other Topics Concern    None   Social History Narrative    None     Social Determinants of Health     Financial Resource Strain: Low Risk  (8/17/2023)    Received from [employer]    Financial Resource Strain     Do you have any trouble paying for your medications, or do you think you might in the future?: No     Does your family have trouble paying for medicine? (Household - for ages 0-17 years): Not on file   Food Insecurity: No Food Insecurity (7/18/2024)    Hunger Vital Sign     Worried About Running Out of Food in the Last Year: Never true     Ran Out of Food in the Last Year: Never true   Transportation Needs: No Transportation Needs (7/18/2024)    PRAPARE - Transportation     Lack of Transportation (Medical): No     Lack of Transportation (Non-Medical): No    Physical Activity: Not on file   Stress: Not on file   Social Connections: Socially Integrated (8/17/2023)    Received from [employer]     How often do you feel lonely or isolated from those around you?: Never   Intimate Partner Violence: Not on file   Housing Stability: Low Risk  (7/18/2024)    Housing Stability Vital Sign     Unable to Pay for Housing in the Last Year: No     Number of Times Moved in the Last Year: 1     Homeless in the Last Year: No     Past Medical History:   Diagnosis Date    Afib (HCC)     SUSANA (acute kidney injury) (HCC) 05/03/2021    Arthritis     Gastro-esophageal reflux disease with esophagitis 11/30/2022    GERD (gastroesophageal reflux disease)     Hyperkalemia 07/05/2023    Hyperlipidemia     Hypertension     Hyponatremia 05/03/2021    Stroke (HCC)      Past Surgical History:   Procedure Laterality Date    CARDIAC PACEMAKER PLACEMENT Left 02/09/2024    NO PAST SURGERIES      SINUS SURGERY         Current Outpatient Medications:     acetaminophen (TYLENOL) 650 mg CR tablet, Take 650 mg by mouth, Disp: , Rfl:     allopurinol (ZYLOPRIM) 100 mg tablet, Take 1 tablet (100 mg total) by mouth daily, Disp: 30 tablet, Rfl: 5    aspirin (Aspirin Low Dose) 81 mg EC tablet, Take 1 tablet by mouth once daily, Disp: 100 tablet, Rfl: 1    atorvastatin (LIPITOR) 20 mg tablet, Take 4 tablets (80 mg total) by mouth daily, Disp: 120 tablet, Rfl: 0    colchicine (COLCRYS) 0.6 mg tablet, Take 1 tablet (0.6 mg total) by mouth daily, Disp: 30 tablet, Rfl: 3    Eliquis 5 MG, Take 1 tablet by mouth twice daily, Disp: 60 tablet, Rfl: 5    fluticasone (FLONASE) 50 mcg/act nasal spray, 1 spray into each nostril daily, Disp: , Rfl:     furosemide (LASIX) 20 mg tablet, Take 1 tablet (20 mg total) by mouth daily Take this once a day, Disp: , Rfl:     latanoprost (XALATAN) 0.005 % ophthalmic solution, INSTILL 1 DROP INTO EACH EYE IN THE EVENING, Disp: , Rfl:     nadolol (CORGARD) 20 mg tablet,  "Take 1 tablet by mouth once daily, Disp: 90 tablet, Rfl: 1    omeprazole (PriLOSEC) 40 MG capsule, Take 1 capsule (40 mg total) by mouth daily, Disp: 100 capsule, Rfl: 1    senna (SENOKOT) 8.6 mg, Take 2 tablets by mouth twice daily, Disp: 120 tablet, Rfl: 5    venlafaxine (EFFEXOR-XR) 37.5 mg 24 hr capsule, Take 37.5 mg by mouth daily Taking every other day, Disp: , Rfl:     Vitamin D, Cholecalciferol, 25 MCG (1000 UT) TABS, Take 1 tablet by mouth once daily, Disp: 30 tablet, Rfl: 5    Lab Results   Component Value Date    SODIUM 141 07/22/2024    K 4.7 07/22/2024     07/22/2024    CO2 27 07/22/2024    AGAP 11 07/22/2024    BUN 32 (H) 07/22/2024    CREATININE 1.23 07/22/2024    GLUC 111 02/07/2024    GLUF 116 (H) 07/22/2024    CALCIUM 10.1 07/22/2024    AST 14 07/22/2024    ALT 8 07/22/2024    ALKPHOS 76 07/22/2024    TP 7.2 07/22/2024    TBILI 0.67 07/22/2024    EGFR 39 07/22/2024     Lab Results   Component Value Date    WBC 5.67 05/17/2024    HGB 11.6 05/17/2024    HCT 38.1 05/17/2024    MCV 93 05/17/2024     05/17/2024     Lab Results   Component Value Date    CHOLESTEROL 161 05/23/2023     Lab Results   Component Value Date    HDL 47 (L) 05/23/2023     Lab Results   Component Value Date    LDLCALC 69 05/23/2023     Lab Results   Component Value Date    TRIG 223 (H) 05/23/2023     No results found for: \"CHOLHDL\"  Lab Results   Component Value Date    MDV9CJELGKET 2.243 11/22/2023    TSH 2.17 08/17/2022     Lab Results   Component Value Date    .3 (H) 04/12/2024    CALCIUM 10.1 07/22/2024    PHOS 3.8 04/12/2024     No results found for: \"SPEP\", \"UPEP\"  No results found for: \"MICROALBUR\", \"[id number]\"        Objective:      /84 (BP Location: Left arm, Patient Position: Sitting, Cuff Size: Standard)   Pulse 64   Temp 97.9 °F (36.6 °C)   Ht 5' 3.5\" (1.613 m)   Wt 73.5 kg (162 lb)   SpO2 97%   BMI 28.25 kg/m²          Physical Exam  Vitals reviewed.   Constitutional:       General: She " is not in acute distress.     Appearance: Normal appearance.   HENT:      Head: Normocephalic and atraumatic.      Right Ear: External ear normal.      Left Ear: External ear normal.   Eyes:      Conjunctiva/sclera: Conjunctivae normal.   Cardiovascular:      Rate and Rhythm: Normal rate and regular rhythm.      Heart sounds: Normal heart sounds.   Pulmonary:      Effort: No respiratory distress.      Breath sounds: No wheezing.   Abdominal:      Palpations: Abdomen is soft.   Musculoskeletal:      Comments: Trace bilateral LE edema   Skin:     General: Skin is warm and dry.   Neurological:      General: No focal deficit present.      Mental Status: She is alert and oriented to person, place, and time.   Psychiatric:         Mood and Affect: Mood normal.         Behavior: Behavior normal.

## 2024-08-14 NOTE — ASSESSMENT & PLAN NOTE
Lab Results   Component Value Date    EGFR 39 07/22/2024    EGFR 33 05/17/2024    EGFR 34 03/29/2024    CREATININE 1.23 07/22/2024    CREATININE 1.42 (H) 05/17/2024    CREATININE 1.40 (H) 03/29/2024     Kidney function continues to slowly improve, now the creatinine down to 1.2 mg/dL.  Continue to optimize care avoid potential for toxins.

## 2024-08-14 NOTE — ASSESSMENT & PLAN NOTE
Patient deferred further treatment at this time and defer to primary care provider.  In the meantime, we will check a vitamin B12 in about 3 months to ensure there is no deficiency.  With respect to treatment, can use gabapentin, however would use caution with total daily dose of more than 1200 mg.

## 2024-08-14 NOTE — ASSESSMENT & PLAN NOTE
Patient continues to require omeprazole at this time, unlikely be able to transition to an H2 blocker.  She is aware that over the course of time this can cause a chronic version of tubulointerstitial nephritis.

## 2024-08-17 PROBLEM — Z00.00 MEDICARE ANNUAL WELLNESS VISIT, SUBSEQUENT: Status: RESOLVED | Noted: 2023-05-31 | Resolved: 2024-08-17

## 2024-08-19 DIAGNOSIS — R45.86 MOOD DISTURBANCE: Primary | ICD-10-CM

## 2024-08-20 RX ORDER — VENLAFAXINE HYDROCHLORIDE 37.5 MG/1
37.5 CAPSULE, EXTENDED RELEASE ORAL DAILY
Qty: 90 CAPSULE | Refills: 1 | Status: SHIPPED | OUTPATIENT
Start: 2024-08-20

## 2024-08-27 DIAGNOSIS — E78.2 MIXED HYPERLIPIDEMIA: ICD-10-CM

## 2024-08-28 RX ORDER — ATORVASTATIN CALCIUM 20 MG/1
80 TABLET, FILM COATED ORAL DAILY
Qty: 120 TABLET | Refills: 0 | Status: SHIPPED | OUTPATIENT
Start: 2024-08-28

## 2024-09-23 DIAGNOSIS — E78.2 MIXED HYPERLIPIDEMIA: ICD-10-CM

## 2024-09-23 DIAGNOSIS — M79.644 PAIN OF FINGER OF RIGHT HAND: Primary | ICD-10-CM

## 2024-09-24 RX ORDER — ATORVASTATIN CALCIUM 20 MG/1
80 TABLET, FILM COATED ORAL DAILY
Qty: 120 TABLET | Refills: 0 | Status: SHIPPED | OUTPATIENT
Start: 2024-09-24

## 2024-09-27 ENCOUNTER — TELEPHONE (OUTPATIENT)
Dept: FAMILY MEDICINE CLINIC | Facility: CLINIC | Age: 84
End: 2024-09-27

## 2024-09-27 NOTE — TELEPHONE ENCOUNTER
Received prior auth for patients atorvastatin calcium 20mg tablets.     Key code: [id number]  Last Name: Felicita   : 1940    Form scanned into media

## 2024-10-07 RX ORDER — AMIODARONE HYDROCHLORIDE 100 MG/1
TABLET ORAL
COMMUNITY
Start: 2024-09-25

## 2024-10-09 ENCOUNTER — HOSPITAL ENCOUNTER (OUTPATIENT)
Dept: RADIOLOGY | Facility: CLINIC | Age: 84
Discharge: HOME | End: 2024-10-09
Payer: COMMERCIAL

## 2024-10-09 ENCOUNTER — OFFICE VISIT (OUTPATIENT)
Dept: OBGYN CLINIC | Facility: CLINIC | Age: 84
End: 2024-10-09
Payer: COMMERCIAL

## 2024-10-09 VITALS
OXYGEN SATURATION: 98 % | TEMPERATURE: 97.9 F | SYSTOLIC BLOOD PRESSURE: 128 MMHG | HEART RATE: 81 BPM | WEIGHT: 165 LBS | DIASTOLIC BLOOD PRESSURE: 85 MMHG | BODY MASS INDEX: 29.23 KG/M2 | HEIGHT: 63 IN

## 2024-10-09 DIAGNOSIS — M19.041 ARTHRITIS OF FINGER OF RIGHT HAND: ICD-10-CM

## 2024-10-09 DIAGNOSIS — M10.9 ACUTE GOUT OF RIGHT HAND, UNSPECIFIED CAUSE: ICD-10-CM

## 2024-10-09 DIAGNOSIS — M79.644 FINGER PAIN, RIGHT: Primary | ICD-10-CM

## 2024-10-09 DIAGNOSIS — M79.644 FINGER PAIN, RIGHT: ICD-10-CM

## 2024-10-09 PROCEDURE — 99204 OFFICE O/P NEW MOD 45 MIN: CPT | Performed by: STUDENT IN AN ORGANIZED HEALTH CARE EDUCATION/TRAINING PROGRAM

## 2024-10-09 PROCEDURE — 73140 X-RAY EXAM OF FINGER(S): CPT

## 2024-10-09 RX ORDER — METHYLPREDNISOLONE 4 MG
TABLET, DOSE PACK ORAL
Qty: 1 EACH | Refills: 0 | Status: SHIPPED | OUTPATIENT
Start: 2024-10-09

## 2024-10-09 NOTE — PROGRESS NOTES
1. Finger pain, right  XR finger right fourth digit-ring    methylPREDNISolone 4 MG tablet therapy pack      2. Arthritis of finger of right hand  methylPREDNISolone 4 MG tablet therapy pack      3. Acute gout of right hand, unspecified cause  methylPREDNISolone 4 MG tablet therapy pack        Orders Placed This Encounter   Procedures    XR finger right fourth digit-ring        Imaging Studies (I personally reviewed images in PACS and report):    X-ray right ring digit 10/9/2024: No acute osseous abnormalities.  Severe osteoarthritic changes of the DIP joints and appearance of autofusion of the DIP joint of the ring digit.    Labs:   Latest Reference Range & Units 05/17/24 10:22 07/22/24 10:34   URIC ACID 2.0 - 7.5 mg/dL 7.3 6.5       IMPRESSION:  Acute on chronic right DIP ring digit pain  Reportedly has a baseline amount of pain especially during rainy days but recently had a flareup of severe pain, swelling, redness and increased sensitivity suspected to be of a gout flare  Had underwent colchicine as well as prednisone and states over the past few days her symptoms have actually been improving she is nontender along her DIP joint today on exam  Radiographic imaging noting severe degenerative changes of her DIP joints/autofusion of her ring digit DIP      PLAN:    Clinical exam and radiographic imaging reviewed with patient today, with impression as per above. I have discussed with the patient the pathophysiology of this diagnosis and reviewed how the examination correlates with this diagnosis.    Imaging obtained/reviewed as per above. I will follow up official radiology interpretation.  Counseled patient that she may have had a flareup of gout exacerbating the pain of her ring DIP joint, she does already have a baseline amount of severe osteoarthritis of her ring digit as well as her other digits.  I counseled the osteoarthritis of her ring digit is actually caused an autofusion of her joint.  In regards to  "flareups of gout I counseled that it was appropriate to try colchicine as well as prednisone in the past for flareups.  I counseled it would likely not be able to perform an intra-articular cortisone injection of her joint given there is no joint to inject at her DIP.  Could consider topical NSAID such as Voltaren during her baseline amount of pain between flareups.  Has chronic pain that does not seem to alleviate with conservative treatments, there could be consideration for surgical intervention with hand surgery but patient/daughter do not wish to pursue this option at this time.  I did prescribe her an as needed Medrol Dosepak for when she experiences flareups of gout of her right ring digit.  I reassured her based on her last lab work that her uric acid that it does appear to be controlled at this time.    Return if symptoms worsen or fail to improve.    Portions of the record may have been created with voice recognition software. Occasional wrong word or \"sound a like\" substitutions may have occurred due to the inherent limitations of voice recognition software. Read the chart carefully and recognize, using context, where substitutions have occurred.     CHIEF COMPLAINT:  Chief Complaint   Patient presents with    Right Hand - Pain     Rt ring finger, pain, lump         HPI:  Malgorzata Mims is a 84 y.o. female  who presents with her daughter in regards to referral from Dr. Budinetz for       Visit 10/9/2024 :  Initial evaluation of right hand pain:  Of note patient has a history of CKD, gout, A-fib and on Eliquis  Reports acute on chronic right ring digit pain. She reports baseline amount of DIP/PIP joint pain of her hands at baseline with activities, especially during rainy days.  Reports about a month ago, she had a severe exacerbation of right ring digit pain along her DIP joint as she states there is much more prominent swelling, redness, heat and sensitivity to even light palpation.  She had discussed " "this with her PCP who prescribed her colchicine.  She also states being on steroids at 1 point.  She states she did not feel provided any relief but actually feels this week that the symptoms have been improving as the swelling, redness have resolved.  She does have prominence of her DIP joints in general.  She denies prior surgeries of her right ring digit in the past.  She has not had any imaging of her right ring digit since pain started.    She states in regards to gout flares, she states it has been more than a year since her last flare.  She admits that this is likely due to her diet from eating thorne.        Medical, Surgical, Family, and Social History    Past Medical History:   Diagnosis Date    Afib ([facility])     SUSANA (acute kidney injury) ([facility]) 05/03/2021    Arthritis     Gastro-esophageal reflux disease with esophagitis 11/30/2022    GERD (gastroesophageal reflux disease)     Hyperkalemia 07/05/2023    Hyperlipidemia     Hypertension     Hyponatremia 05/03/2021    Stroke ([facility])      Past Surgical History:   Procedure Laterality Date    CARDIAC PACEMAKER PLACEMENT Left 02/09/2024    NO PAST SURGERIES      SINUS SURGERY       Social History   Social History     Substance and Sexual Activity   Alcohol Use Not Currently     Social History     Substance and Sexual Activity   Drug Use Never     Social History     Tobacco Use   Smoking Status Never    Passive exposure: Never   Smokeless Tobacco Never     History reviewed. No pertinent family history.  Allergies   Allergen Reactions    Statins Myalgia     myalgias    Amoxicillin Hives     Doubtful Rash and peeling skin    Sulfa Antibiotics Hives and Rash     doubtful    Sulfur Dermatitis     Sulfur meds per daughter    Levofloxacin Hives     Joint pains          Physical Exam  /85 (BP Location: Left arm)   Pulse 81   Temp 97.9 °F (36.6 °C)   Ht 5' 3\" (1.6 m)   Wt 74.8 kg (165 lb)   SpO2 98%   BMI 29.23 kg/m²     Constitutional:  see vital signs  Gen: " well-developed, normocephalic/atraumatic, well-groomed  Pulmonary/Chest: Effort normal. No respiratory distress.     Ortho Exam  Right ring digit/hand exam:  Inspection +herbeden/klever nodes of all digits. No erythema/open wounds  Palpation: denies TTP along ring digit today and specifically nontender over DIP/PIP/MCP.  No pain on valgus/varus stress testing of the MCP/PIP/DIP.  ROM: Patient is unable to flex her DIP from full extension joint when DIP isolated.  She has limited but intact ROM at the PIP joint and full ROM at the MCP joint of the ring digit.    Procedures

## 2024-10-22 DIAGNOSIS — E78.2 MIXED HYPERLIPIDEMIA: ICD-10-CM

## 2024-10-22 DIAGNOSIS — M10.9 ACUTE GOUT, UNSPECIFIED CAUSE, UNSPECIFIED SITE: ICD-10-CM

## 2024-10-23 DIAGNOSIS — E78.2 MIXED HYPERLIPIDEMIA: ICD-10-CM

## 2024-10-23 RX ORDER — ALLOPURINOL 100 MG/1
100 TABLET ORAL DAILY
Qty: 30 TABLET | Refills: 5 | Status: SHIPPED | OUTPATIENT
Start: 2024-10-23

## 2024-10-23 RX ORDER — ATORVASTATIN CALCIUM 20 MG/1
80 TABLET, FILM COATED ORAL DAILY
Qty: 120 TABLET | Refills: 0 | Status: SHIPPED | OUTPATIENT
Start: 2024-10-23 | End: 2024-10-24

## 2024-10-24 RX ORDER — ATORVASTATIN CALCIUM 20 MG/1
80 TABLET, FILM COATED ORAL DAILY
Qty: 120 TABLET | Refills: 0 | Status: SHIPPED | OUTPATIENT
Start: 2024-10-24

## 2024-11-15 ENCOUNTER — APPOINTMENT (OUTPATIENT)
Dept: LAB | Facility: CLINIC | Age: 84
End: 2024-11-15
Payer: COMMERCIAL

## 2024-11-15 DIAGNOSIS — E53.8 VITAMIN B12 DEFICIENCY: ICD-10-CM

## 2024-11-15 DIAGNOSIS — N18.32 STAGE 3B CHRONIC KIDNEY DISEASE (HCC): ICD-10-CM

## 2024-11-15 DIAGNOSIS — N25.81 SECONDARY HYPERPARATHYROIDISM OF RENAL ORIGIN (HCC): ICD-10-CM

## 2024-11-15 LAB
ANION GAP SERPL CALCULATED.3IONS-SCNC: 13 MMOL/L (ref 4–13)
BUN SERPL-MCNC: 36 MG/DL (ref 5–25)
CALCIUM SERPL-MCNC: 9.6 MG/DL (ref 8.4–10.2)
CHLORIDE SERPL-SCNC: 107 MMOL/L (ref 96–108)
CO2 SERPL-SCNC: 26 MMOL/L (ref 21–32)
CREAT SERPL-MCNC: 1.49 MG/DL (ref 0.6–1.3)
GFR SERPL CREATININE-BSD FRML MDRD: 31 ML/MIN/1.73SQ M
GLUCOSE P FAST SERPL-MCNC: 116 MG/DL (ref 65–99)
POTASSIUM SERPL-SCNC: 4.2 MMOL/L (ref 3.5–5.3)
PTH-INTACT SERPL-MCNC: 91.7 PG/ML (ref 12–88)
SODIUM SERPL-SCNC: 146 MMOL/L (ref 135–147)
VIT B12 SERPL-MCNC: 247 PG/ML (ref 180–914)

## 2024-11-15 PROCEDURE — 83970 ASSAY OF PARATHORMONE: CPT

## 2024-11-15 PROCEDURE — 36415 COLL VENOUS BLD VENIPUNCTURE: CPT

## 2024-11-15 PROCEDURE — 82607 VITAMIN B-12: CPT

## 2024-11-15 PROCEDURE — 80048 BASIC METABOLIC PNL TOTAL CA: CPT

## 2024-11-17 DIAGNOSIS — J12.82 PNEUMONIA DUE TO COVID-19 VIRUS: ICD-10-CM

## 2024-11-17 DIAGNOSIS — U07.1 PNEUMONIA DUE TO COVID-19 VIRUS: ICD-10-CM

## 2024-11-18 ENCOUNTER — RESULTS FOLLOW-UP (OUTPATIENT)
Age: 84
End: 2024-11-18

## 2024-11-18 RX ORDER — NADOLOL 20 MG/1
20 TABLET ORAL DAILY
Qty: 90 TABLET | Refills: 1 | Status: SHIPPED | OUTPATIENT
Start: 2024-11-18

## 2024-11-25 DIAGNOSIS — E78.2 MIXED HYPERLIPIDEMIA: ICD-10-CM

## 2024-11-26 RX ORDER — ATORVASTATIN CALCIUM 20 MG/1
80 TABLET, FILM COATED ORAL DAILY
Qty: 120 TABLET | Refills: 0 | Status: SHIPPED | OUTPATIENT
Start: 2024-11-26

## 2024-11-26 NOTE — TELEPHONE ENCOUNTER
Requested medication(s) are due for refill today: Yes  Patient has already received a courtesy refill: No  Other reason request has been forwarded to provider:   
pt's choise

## 2024-11-29 ENCOUNTER — TELEPHONE (OUTPATIENT)
Age: 84
End: 2024-11-29

## 2024-11-29 NOTE — TELEPHONE ENCOUNTER
Devi daughter stated Malgorzata has been a nose bleed  since this morning. Patient is on Rx: Eliquis.

## 2024-11-29 NOTE — TELEPHONE ENCOUNTER
Contacted pt daughter and advised due to nose bleeding off and on since this morning around 4:30 a.m. and pt being on blood thinners pt should be evaluated by the ER. Ok'd by Sophie.

## 2024-12-09 DIAGNOSIS — K59.09 OTHER CONSTIPATION: ICD-10-CM

## 2024-12-09 RX ORDER — SENNOSIDES 8.6 MG
17.2 TABLET ORAL 2 TIMES DAILY
Qty: 360 TABLET | Refills: 5 | Status: SHIPPED | OUTPATIENT
Start: 2024-12-09

## 2024-12-16 ENCOUNTER — APPOINTMENT (OUTPATIENT)
Dept: LAB | Facility: CLINIC | Age: 84
End: 2024-12-16
Payer: COMMERCIAL

## 2024-12-16 DIAGNOSIS — E78.00 PURE HYPERCHOLESTEROLEMIA: ICD-10-CM

## 2024-12-16 DIAGNOSIS — I48.0 PAROXYSMAL ATRIAL FIBRILLATION (HCC): ICD-10-CM

## 2024-12-16 LAB
ALBUMIN SERPL BCG-MCNC: 4.3 G/DL (ref 3.5–5)
ALP SERPL-CCNC: 69 U/L (ref 34–104)
ALT SERPL W P-5'-P-CCNC: 11 U/L (ref 7–52)
ANION GAP SERPL CALCULATED.3IONS-SCNC: 13 MMOL/L (ref 4–13)
AST SERPL W P-5'-P-CCNC: 19 U/L (ref 13–39)
BILIRUB DIRECT SERPL-MCNC: 0.13 MG/DL (ref 0–0.2)
BILIRUB SERPL-MCNC: 0.81 MG/DL (ref 0.2–1)
BUN SERPL-MCNC: 33 MG/DL (ref 5–25)
CHLORIDE SERPL-SCNC: 103 MMOL/L (ref 96–108)
CHOLEST SERPL-MCNC: 143 MG/DL (ref ?–200)
CK SERPL-CCNC: 71 U/L (ref 26–192)
CO2 SERPL-SCNC: 25 MMOL/L (ref 21–32)
CREAT SERPL-MCNC: 1.58 MG/DL (ref 0.6–1.3)
ERYTHROCYTE [DISTWIDTH] IN BLOOD BY AUTOMATED COUNT: 15.7 % (ref 11.6–15.1)
GFR SERPL CREATININE-BSD FRML MDRD: 29 ML/MIN/1.73SQ M
HCT VFR BLD AUTO: 35.7 % (ref 34.8–46.1)
HDLC SERPL-MCNC: 40 MG/DL
HGB BLD-MCNC: 10.9 G/DL (ref 11.5–15.4)
LDLC SERPL CALC-MCNC: 64 MG/DL (ref 0–100)
MAGNESIUM SERPL-MCNC: 2.1 MG/DL (ref 1.9–2.7)
MCH RBC QN AUTO: 28.6 PG (ref 26.8–34.3)
MCHC RBC AUTO-ENTMCNC: 30.5 G/DL (ref 31.4–37.4)
MCV RBC AUTO: 94 FL (ref 82–98)
PLATELET # BLD AUTO: 262 THOUSANDS/UL (ref 149–390)
PMV BLD AUTO: 11.3 FL (ref 8.9–12.7)
POTASSIUM SERPL-SCNC: 4.1 MMOL/L (ref 3.5–5.3)
PROT SERPL-MCNC: 7 G/DL (ref 6.4–8.4)
RBC # BLD AUTO: 3.81 MILLION/UL (ref 3.81–5.12)
SODIUM SERPL-SCNC: 141 MMOL/L (ref 135–147)
TRIGL SERPL-MCNC: 196 MG/DL (ref ?–150)
TSH SERPL DL<=0.05 MIU/L-ACNC: 9.85 UIU/ML (ref 0.45–4.5)
WBC # BLD AUTO: 6.66 THOUSAND/UL (ref 4.31–10.16)

## 2024-12-16 PROCEDURE — 80051 ELECTROLYTE PANEL: CPT

## 2024-12-16 PROCEDURE — 82550 ASSAY OF CK (CPK): CPT

## 2024-12-16 PROCEDURE — 80076 HEPATIC FUNCTION PANEL: CPT

## 2024-12-16 PROCEDURE — 36415 COLL VENOUS BLD VENIPUNCTURE: CPT

## 2024-12-16 PROCEDURE — 85027 COMPLETE CBC AUTOMATED: CPT

## 2024-12-16 PROCEDURE — 80061 LIPID PANEL: CPT

## 2024-12-16 PROCEDURE — 84520 ASSAY OF UREA NITROGEN: CPT

## 2024-12-16 PROCEDURE — 84443 ASSAY THYROID STIM HORMONE: CPT

## 2024-12-16 PROCEDURE — 83735 ASSAY OF MAGNESIUM: CPT

## 2024-12-16 PROCEDURE — 82565 ASSAY OF CREATININE: CPT

## 2024-12-24 DIAGNOSIS — E78.2 MIXED HYPERLIPIDEMIA: ICD-10-CM

## 2024-12-24 RX ORDER — ATORVASTATIN CALCIUM 20 MG/1
80 TABLET, FILM COATED ORAL DAILY
Qty: 120 TABLET | Refills: 2 | Status: SHIPPED | OUTPATIENT
Start: 2024-12-24

## 2025-01-14 ENCOUNTER — RA CDI HCC (OUTPATIENT)
Dept: OTHER | Facility: HOSPITAL | Age: 85
End: 2025-01-14

## 2025-01-14 DIAGNOSIS — I10 BENIGN ESSENTIAL HTN: ICD-10-CM

## 2025-01-15 RX ORDER — OMEPRAZOLE 40 MG/1
40 CAPSULE, DELAYED RELEASE ORAL DAILY
Qty: 100 CAPSULE | Refills: 1 | Status: SHIPPED | OUTPATIENT
Start: 2025-01-15

## 2025-01-17 DIAGNOSIS — I48.91 NEW ONSET A-FIB (HCC): ICD-10-CM

## 2025-01-17 RX ORDER — MULTIVIT-MIN/IRON/FOLIC ACID/K 18-600-40
1 CAPSULE ORAL DAILY
Qty: 30 TABLET | Refills: 5 | Status: SHIPPED | OUTPATIENT
Start: 2025-01-17

## 2025-01-22 ENCOUNTER — OFFICE VISIT (OUTPATIENT)
Dept: FAMILY MEDICINE CLINIC | Facility: CLINIC | Age: 85
End: 2025-01-22
Payer: COMMERCIAL

## 2025-01-22 VITALS
HEART RATE: 75 BPM | TEMPERATURE: 97.8 F | DIASTOLIC BLOOD PRESSURE: 82 MMHG | SYSTOLIC BLOOD PRESSURE: 124 MMHG | OXYGEN SATURATION: 97 % | BODY MASS INDEX: 29.67 KG/M2 | WEIGHT: 167.5 LBS

## 2025-01-22 DIAGNOSIS — I48.11 LONGSTANDING PERSISTENT ATRIAL FIBRILLATION (HCC): ICD-10-CM

## 2025-01-22 DIAGNOSIS — E03.8 OTHER SPECIFIED HYPOTHYROIDISM: Primary | ICD-10-CM

## 2025-01-22 DIAGNOSIS — N18.32 STAGE 3B CHRONIC KIDNEY DISEASE (HCC): ICD-10-CM

## 2025-01-22 PROBLEM — F51.01 PRIMARY INSOMNIA: Status: ACTIVE | Noted: 2025-01-22

## 2025-01-22 PROCEDURE — 99214 OFFICE O/P EST MOD 30 MIN: CPT | Performed by: FAMILY MEDICINE

## 2025-01-22 PROCEDURE — G2211 COMPLEX E/M VISIT ADD ON: HCPCS | Performed by: FAMILY MEDICINE

## 2025-01-22 RX ORDER — LEVOTHYROXINE SODIUM 25 UG/1
25 TABLET ORAL DAILY
Qty: 30 TABLET | Refills: 6 | Status: SHIPPED | OUTPATIENT
Start: 2025-01-22

## 2025-01-22 NOTE — PROGRESS NOTES
Name: Malgorzata Mims      : 1940      MRN: [id number]  Encounter Provider: Robert Budinetz, MD  Encounter Date: 2025   Encounter department: [facility]IMARY [facility]  :  Assessment & Plan  Other specified hypothyroidism  Start synthroid                History of Present Illness   The patient is here for follow-up.  She still has intermittent pain in her hand and finger region due to her rhinitis and gout.  She is following with nephrology for chronic kidney disease and cardiology as well.  I saw she had some blood work in December that showed a TSH of greater than 9.  She does have a lot of fatigue and lack of energy and motivation.  I am going to start her on Synthroid today.  I reviewed all of her medications and recent test results there is nothing else acute going on at this time.      Review of Systems   Respiratory: Negative.     Cardiovascular: Negative.    Gastrointestinal: Negative.        Objective   /82 (BP Location: Right arm, Patient Position: Sitting, Cuff Size: Standard)   Pulse 75   Temp 97.8 °F (36.6 °C) (Temporal)   Wt 76 kg (167 lb 8 oz)   SpO2 97%   BMI 29.67 kg/m²      Physical Exam  Vitals and nursing note reviewed.   Constitutional:       General: She is not in acute distress.     Appearance: She is well-developed.   HENT:      Head: Normocephalic and atraumatic.   Eyes:      Conjunctiva/sclera: Conjunctivae normal.   Cardiovascular:      Rate and Rhythm: Normal rate and regular rhythm.      Heart sounds: No murmur heard.  Pulmonary:      Effort: Pulmonary effort is normal. No respiratory distress.      Breath sounds: Normal breath sounds.   Abdominal:      Palpations: Abdomen is soft.      Tenderness: There is no abdominal tenderness.   Musculoskeletal:         General: No swelling.      Cervical back: Neck supple.   Skin:     General: Skin is warm and dry.      Capillary Refill: Capillary refill takes less than 2 seconds.   Neurological:      Mental Status:  She is alert.   Psychiatric:         Mood and Affect: Mood normal.

## 2025-01-27 DIAGNOSIS — I48.20 CHRONIC ATRIAL FIBRILLATION (HCC): ICD-10-CM

## 2025-01-28 RX ORDER — FUROSEMIDE 20 MG/1
20 TABLET ORAL 2 TIMES DAILY
Qty: 180 TABLET | Refills: 1 | Status: SHIPPED | OUTPATIENT
Start: 2025-01-28

## 2025-01-28 RX ORDER — FUROSEMIDE 20 MG/1
20 TABLET ORAL DAILY
Qty: 30 TABLET | Refills: 0 | OUTPATIENT
Start: 2025-01-28

## 2025-02-12 DIAGNOSIS — I48.91 NEW ONSET A-FIB (HCC): ICD-10-CM

## 2025-02-12 RX ORDER — APIXABAN 5 MG/1
5 TABLET, FILM COATED ORAL 2 TIMES DAILY
Qty: 60 TABLET | Refills: 5 | Status: SHIPPED | OUTPATIENT
Start: 2025-02-12

## 2025-02-14 ENCOUNTER — APPOINTMENT (OUTPATIENT)
Dept: LAB | Facility: CLINIC | Age: 85
End: 2025-02-14
Payer: COMMERCIAL

## 2025-02-14 DIAGNOSIS — N18.32 STAGE 3B CHRONIC KIDNEY DISEASE (HCC): ICD-10-CM

## 2025-02-14 LAB
ALBUMIN SERPL BCG-MCNC: 4.3 G/DL (ref 3.5–5)
ALP SERPL-CCNC: 72 U/L (ref 34–104)
ALT SERPL W P-5'-P-CCNC: 12 U/L (ref 7–52)
ANION GAP SERPL CALCULATED.3IONS-SCNC: 12 MMOL/L (ref 4–13)
AST SERPL W P-5'-P-CCNC: 17 U/L (ref 13–39)
BACTERIA UR QL AUTO: ABNORMAL /HPF
BASOPHILS # BLD AUTO: 0.08 THOUSANDS/ÂΜL (ref 0–0.1)
BASOPHILS NFR BLD AUTO: 1 % (ref 0–1)
BILIRUB SERPL-MCNC: 0.74 MG/DL (ref 0.2–1)
BILIRUB UR QL STRIP: NEGATIVE
BUN SERPL-MCNC: 33 MG/DL (ref 5–25)
CALCIUM SERPL-MCNC: 9.8 MG/DL (ref 8.4–10.2)
CHLORIDE SERPL-SCNC: 101 MMOL/L (ref 96–108)
CLARITY UR: ABNORMAL
CO2 SERPL-SCNC: 27 MMOL/L (ref 21–32)
COLOR UR: YELLOW
CREAT SERPL-MCNC: 1.87 MG/DL (ref 0.6–1.3)
CREAT UR-MCNC: 133.2 MG/DL
EOSINOPHIL # BLD AUTO: 0.22 THOUSAND/ÂΜL (ref 0–0.61)
EOSINOPHIL NFR BLD AUTO: 3 % (ref 0–6)
ERYTHROCYTE [DISTWIDTH] IN BLOOD BY AUTOMATED COUNT: 14.8 % (ref 11.6–15.1)
GFR SERPL CREATININE-BSD FRML MDRD: 23 ML/MIN/1.73SQ M
GLUCOSE P FAST SERPL-MCNC: 111 MG/DL (ref 65–99)
GLUCOSE UR STRIP-MCNC: NEGATIVE MG/DL
HCT VFR BLD AUTO: 34.2 % (ref 34.8–46.1)
HGB BLD-MCNC: 10.6 G/DL (ref 11.5–15.4)
HGB UR QL STRIP.AUTO: NEGATIVE
HYALINE CASTS #/AREA URNS LPF: ABNORMAL /LPF
IMM GRANULOCYTES # BLD AUTO: 0.05 THOUSAND/UL (ref 0–0.2)
IMM GRANULOCYTES NFR BLD AUTO: 1 % (ref 0–2)
KETONES UR STRIP-MCNC: NEGATIVE MG/DL
LEUKOCYTE ESTERASE UR QL STRIP: ABNORMAL
LYMPHOCYTES # BLD AUTO: 2.17 THOUSANDS/ÂΜL (ref 0.6–4.47)
LYMPHOCYTES NFR BLD AUTO: 29 % (ref 14–44)
MAGNESIUM SERPL-MCNC: 2 MG/DL (ref 1.9–2.7)
MCH RBC QN AUTO: 28.3 PG (ref 26.8–34.3)
MCHC RBC AUTO-ENTMCNC: 31 G/DL (ref 31.4–37.4)
MCV RBC AUTO: 91 FL (ref 82–98)
MICROALBUMIN UR-MCNC: 17.8 MG/L
MICROALBUMIN/CREAT 24H UR: 13 MG/G CREATININE (ref 0–30)
MONOCYTES # BLD AUTO: 0.8 THOUSAND/ÂΜL (ref 0.17–1.22)
MONOCYTES NFR BLD AUTO: 11 % (ref 4–12)
NEUTROPHILS # BLD AUTO: 4.09 THOUSANDS/ÂΜL (ref 1.85–7.62)
NEUTS SEG NFR BLD AUTO: 55 % (ref 43–75)
NITRITE UR QL STRIP: NEGATIVE
NON-SQ EPI CELLS URNS QL MICRO: ABNORMAL /HPF
NRBC BLD AUTO-RTO: 0 /100 WBCS
PH UR STRIP.AUTO: 6 [PH]
PHOSPHATE SERPL-MCNC: 4 MG/DL (ref 2.3–4.1)
PLATELET # BLD AUTO: 244 THOUSANDS/UL (ref 149–390)
PMV BLD AUTO: 11.4 FL (ref 8.9–12.7)
POTASSIUM SERPL-SCNC: 4.3 MMOL/L (ref 3.5–5.3)
PROT SERPL-MCNC: 7 G/DL (ref 6.4–8.4)
PROT UR STRIP-MCNC: ABNORMAL MG/DL
PTH-INTACT SERPL-MCNC: 100 PG/ML (ref 12–88)
RBC # BLD AUTO: 3.75 MILLION/UL (ref 3.81–5.12)
RBC #/AREA URNS AUTO: ABNORMAL /HPF
SODIUM SERPL-SCNC: 140 MMOL/L (ref 135–147)
SP GR UR STRIP.AUTO: 1.02 (ref 1–1.03)
UROBILINOGEN UR STRIP-ACNC: <2 MG/DL
WBC # BLD AUTO: 7.41 THOUSAND/UL (ref 4.31–10.16)
WBC #/AREA URNS AUTO: ABNORMAL /HPF

## 2025-02-14 PROCEDURE — 83970 ASSAY OF PARATHORMONE: CPT

## 2025-02-14 PROCEDURE — 36415 COLL VENOUS BLD VENIPUNCTURE: CPT

## 2025-02-14 PROCEDURE — 85025 COMPLETE CBC W/AUTO DIFF WBC: CPT

## 2025-02-14 PROCEDURE — 83735 ASSAY OF MAGNESIUM: CPT

## 2025-02-14 PROCEDURE — 81001 URINALYSIS AUTO W/SCOPE: CPT

## 2025-02-14 PROCEDURE — 82570 ASSAY OF URINE CREATININE: CPT

## 2025-02-14 PROCEDURE — 80053 COMPREHEN METABOLIC PANEL: CPT

## 2025-02-14 PROCEDURE — 84100 ASSAY OF PHOSPHORUS: CPT

## 2025-02-14 PROCEDURE — 82043 UR ALBUMIN QUANTITATIVE: CPT

## 2025-02-24 ENCOUNTER — OFFICE VISIT (OUTPATIENT)
Dept: NEPHROLOGY | Facility: CLINIC | Age: 85
End: 2025-02-24
Payer: COMMERCIAL

## 2025-02-24 ENCOUNTER — TELEPHONE (OUTPATIENT)
Age: 85
End: 2025-02-24

## 2025-02-24 VITALS
HEIGHT: 63 IN | WEIGHT: 161.2 LBS | SYSTOLIC BLOOD PRESSURE: 138 MMHG | BODY MASS INDEX: 28.56 KG/M2 | RESPIRATION RATE: 16 BRPM | DIASTOLIC BLOOD PRESSURE: 60 MMHG | TEMPERATURE: 98 F | HEART RATE: 85 BPM | OXYGEN SATURATION: 99 %

## 2025-02-24 DIAGNOSIS — R79.89 ELEVATED SERUM CREATININE: Primary | ICD-10-CM

## 2025-02-24 DIAGNOSIS — I48.20 CHRONIC ATRIAL FIBRILLATION (HCC): ICD-10-CM

## 2025-02-24 DIAGNOSIS — K21.9 GASTROESOPHAGEAL REFLUX DISEASE WITHOUT ESOPHAGITIS: ICD-10-CM

## 2025-02-24 DIAGNOSIS — N14.19 NEPHROPATHY DUE TO NONSTEROIDAL ANTI-INFLAMMATORY DRUG (NSAID): ICD-10-CM

## 2025-02-24 DIAGNOSIS — N18.32 CKD STAGE 3B, GFR 30-44 ML/MIN (HCC): ICD-10-CM

## 2025-02-24 DIAGNOSIS — T39.395A NEPHROPATHY DUE TO NONSTEROIDAL ANTI-INFLAMMATORY DRUG (NSAID): ICD-10-CM

## 2025-02-24 PROBLEM — N18.4 CHRONIC KIDNEY DISEASE, STAGE 4 (SEVERE) (HCC): Status: ACTIVE | Noted: 2025-02-24

## 2025-02-24 PROCEDURE — 99214 OFFICE O/P EST MOD 30 MIN: CPT | Performed by: NURSE PRACTITIONER

## 2025-02-24 RX ORDER — FUROSEMIDE 20 MG/1
20 TABLET ORAL EVERY OTHER DAY
Qty: 180 TABLET | Refills: 1 | Status: SHIPPED | OUTPATIENT
Start: 2025-02-24

## 2025-02-24 RX ORDER — FAMOTIDINE 20 MG/1
20 TABLET, FILM COATED ORAL DAILY
Qty: 90 TABLET | Refills: 1 | Status: SHIPPED | OUTPATIENT
Start: 2025-02-24

## 2025-02-24 NOTE — TELEPHONE ENCOUNTER
Patient daughter called in inquiring about FMLA forms that was faxed a week ago warm transfer to [facility] at [place] primary care clerical asked daughter to refax forms pt daughter had no further questions Thank You

## 2025-02-24 NOTE — PATIENT INSTRUCTIONS
It was nice to see you. Please stop Prilosec and start Pepcid (famotidine) 20 mg daily.  Use lasix 20 mg every other day and watch for swelling  Repeat bmp to check kidneys in 1-2 weeks  Hydrate to 2 quarts per day

## 2025-02-24 NOTE — PROGRESS NOTES
[facility]   Office Follow-Up  Malgorzata Mims 84 y.o. female MRN: [id number]    Encounter: [id number]        Assessment & Plan    Malgorzata Mims was seen in the [place] office today. All diagnoses and orders for visit:     Assessment & Plan  Elevated serum creatinine  Suspect volume contraction in setting of lasix. She has been trying to hydrate up to 2 quarts per day. There is no edema on exam. She is normotensive. Does not use NSAIDs. No symptoms of UTI despite UA findings. Will reduce lasix to every other day, switch PPI to Pepcid. Advised to report and symptoms of UTI. 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 Most recent creatinine 1.9 mg/dL. Suspect volume contraction in setting of decreased hydration and lasix. Lasix reduced to every other day and she has been trying to increase hydration to 2 quarts per day. Switch PPI to pepcid. Repeat labs 1-2 weeks  Gastroesophageal reflux disease without esophagitis  Switch PPI to pepcid and follow response         HPI: Malgorzata Mims is a 84 y.o. female who is here for scheduled follow-up    Pertinent medical problems include: CKD 3b, HTN, atrial fibrillation on Eliquis, hypothyroidism, neuropathy, HLD. Started on Synthroid earlier this year for TSH > 9, gout.     Elevated creatinine noted on recent labs. Patient in no extremis. Blood pressure initially elevated however improved with rest. No edema on exam.   Plan: reduce lasix to every other day. Stop PPI and use Pepcid. Repeat labs 1-2 weeks. Hydrate 2 quarts per day. Continue low sodium diet.       ROS:    Review of Systems   Constitutional:  Negative for chills and fever.        Struggles with hydration   HENT:  Negative for ear pain and sore throat.    Eyes:  Negative for pain and visual disturbance.   Respiratory:  Negative for cough and shortness of breath.    Cardiovascular:  Negative for chest pain and palpitations.   Gastrointestinal:  Negative for abdominal pain and vomiting.   Genitourinary:  Negative for dysuria, hematuria and pelvic pain.   Musculoskeletal:  Negative for arthralgias and back pain.   Skin:  Negative for color change and rash.   Neurological:  Negative for seizures and syncope.   All other systems reviewed and are negative.      Allergies: Statins, Amoxicillin, Sulfa antibiotics, Sulfur, and Levofloxacin    Medications:   Current Outpatient Medications:     acetaminophen (TYLENOL) 650 mg CR tablet, Take 650 mg by mouth, Disp: , Rfl:     allopurinol (ZYLOPRIM) 100 mg tablet, Take 1 tablet (100 mg total) by mouth daily, Disp: 30 tablet, Rfl: 5    aspirin (Aspirin Low Dose) 81 mg EC tablet, Take 1 tablet (81 mg total) by mouth daily, Disp: 100 tablet, Rfl: 1    atorvastatin (LIPITOR) 20 mg tablet, TAKE 4 TABLETS BY MOUTH ONCE DAILY, Disp: 120 tablet, Rfl: 2    Eliquis 5 MG, Take 1 tablet by mouth twice daily, Disp: 60 tablet, Rfl: 5    famotidine (PEPCID) 20 mg tablet, Take 1 tablet (20 mg total) by mouth daily, Disp: 90 tablet, Rfl: 1    fluticasone (FLONASE) 50 mcg/act nasal spray, 1 spray into each nostril daily, Disp: , Rfl:     furosemide (LASIX) 20 mg tablet, Take 1 tablet (20 mg total) by mouth every other day, Disp: 180 tablet, Rfl: 1    latanoprost (XALATAN) 0.005 % ophthalmic solution, INSTILL 1 DROP INTO EACH EYE IN THE EVENING, Disp: , Rfl:     levothyroxine (Synthroid) 25 mcg tablet, Take 1 tablet (25 mcg total) by mouth daily, Disp: 30 tablet, Rfl: 6    nadolol (CORGARD) 20 mg tablet, Take 1 tablet by mouth once daily, Disp: 90 tablet, Rfl: 1    Pacerone 100 MG tablet, , Disp: ,  "Rfl:     senna (SENOKOT) 8.6 mg, Take 2 tablets (17.2 mg total) by mouth 2 (two) times a day, Disp: 360 tablet, Rfl: 5    venlafaxine (EFFEXOR-XR) 37.5 mg 24 hr capsule, Take 1 capsule by mouth once daily, Disp: 90 capsule, Rfl: 1    Vitamin D, Cholecalciferol, 25 MCG (1000 UT) TABS, Take 1 tablet by mouth once daily, Disp: 30 tablet, Rfl: 5    Past Medical History:   Diagnosis Date    Afib ([facility])     SUSANA (acute kidney injury) ([facility]) 05/03/2021    Arthritis     Gastro-esophageal reflux disease with esophagitis 11/30/2022    GERD (gastroesophageal reflux disease)     Hyperkalemia 07/05/2023    Hyperlipidemia     Hypertension     Hyponatremia 05/03/2021    Stroke ([facility])      Past Surgical History:   Procedure Laterality Date    CARDIAC PACEMAKER PLACEMENT Left 02/09/2024    NO PAST SURGERIES      SINUS SURGERY       History reviewed. No pertinent family history.   reports that she has never smoked. She has never been exposed to tobacco smoke. She has never used smokeless tobacco. She reports that she does not currently use alcohol. She reports that she does not use drugs.      Physical Exam:   Vitals:    02/24/25 1608 02/24/25 1626   BP: (!) 156/101 138/60   Patient Position: Sitting    Cuff Size: Standard    Pulse: 85    Resp: 16    Temp: 98 °F (36.7 °C)    TempSrc: Temporal    SpO2: 99%    Weight: 73.1 kg (161 lb 3.2 oz)    Height: 5' 3\" (1.6 m)      Body mass index is 28.56 kg/m².    General: conscious, cooperative, in no acute distress, appears stated age  Eyes: conjunctivae pale, anicteric sclerae  ENT: lips and mucous membranes moist  Neck: supple, no JVD, no masses  Chest:  essentially clear breath sounds bilaterally, no crackles, ronchus or wheezings  CVS: S1 & S2, normal rate, regular rhythm  Abdomen: soft, non-tender, non-distended, normoactive bowel sounds, rounded  Extremities: no edema of both legs  Skin: no rash   Neuro: awake, alert, oriented       Diagnostic Data:  Lab: I have personally reviewed " "pertinent lab results.,   CBC:       CMP: No results found for: \"SODIUM\", \"K\", \"CL\", \"CO2\", \"ANIONGAP\", \"BUN\", \"CREATININE\", \"GLUCOSE\", \"CALCIUM\", \"AST\", \"ALT\", \"ALKPHOS\", \"PROT\", \"BILITOT\", \"EGFR\",   PT/INR: No results found for: \"PT\", \"INR\",   Magnesium: No components found for: \"MAG\",  Phosphorous: No results found for: \"PHOS\"    Patient Instructions   It was nice to see you. Please stop Prilosec and start Pepcid (famotidine) 20 mg daily.  Use lasix 20 mg every other day and watch for swelling  Repeat bmp to check kidneys in 1-2 weeks  Hydrate to 2 quarts per day      Portions of the record may have been created with voice recognition software. Occasional wrong word or \"sound a like\" substitutions may have occurred due to the inherent limitations of voice recognition software. Read the chart carefully and recognize, using context, where substitutions have occurred.    If you have any questions, please contact the dictating provider.  "

## 2025-02-27 DIAGNOSIS — E78.2 MIXED HYPERLIPIDEMIA: ICD-10-CM

## 2025-02-27 DIAGNOSIS — Q15.9 EYE ABNORMALITIES: Primary | ICD-10-CM

## 2025-02-27 DIAGNOSIS — K59.09 OTHER CONSTIPATION: ICD-10-CM

## 2025-02-27 DIAGNOSIS — M10.9 ACUTE GOUT, UNSPECIFIED CAUSE, UNSPECIFIED SITE: ICD-10-CM

## 2025-02-28 RX ORDER — ATORVASTATIN CALCIUM 20 MG/1
80 TABLET, FILM COATED ORAL DAILY
Qty: 360 TABLET | Refills: 1 | Status: SHIPPED | OUTPATIENT
Start: 2025-02-28 | End: 2025-03-05

## 2025-02-28 RX ORDER — SENNOSIDES 8.6 MG
17.2 TABLET ORAL 2 TIMES DAILY
Qty: 360 TABLET | Refills: 1 | Status: SHIPPED | OUTPATIENT
Start: 2025-02-28

## 2025-02-28 RX ORDER — ALLOPURINOL 100 MG/1
100 TABLET ORAL DAILY
Qty: 90 TABLET | Refills: 1 | Status: SHIPPED | OUTPATIENT
Start: 2025-02-28

## 2025-02-28 RX ORDER — LATANOPROST 50 UG/ML
1 SOLUTION/ DROPS OPHTHALMIC
Qty: 7.5 ML | Refills: 1 | Status: SHIPPED | OUTPATIENT
Start: 2025-02-28 | End: 2025-03-05 | Stop reason: SDUPTHER

## 2025-03-01 ENCOUNTER — PATIENT MESSAGE (OUTPATIENT)
Dept: FAMILY MEDICINE CLINIC | Facility: CLINIC | Age: 85
End: 2025-03-01

## 2025-03-03 ENCOUNTER — TELEPHONE (OUTPATIENT)
Dept: FAMILY MEDICINE CLINIC | Facility: CLINIC | Age: 85
End: 2025-03-03

## 2025-03-03 NOTE — PATIENT COMMUNICATION
Patients daughter called to confirm if the [facility] paperwork was received. Confirmed with clerical Dalia that the forms have been received and are next to be completed by Dr. Budinetz. Advised patients daughter of this and she asked for a return phone call when the forms are completed and for the forms to be returned via [employer] when completed.    Patients daughter phone number [phone].    Patients daughter is Verified on  Comm Consent.

## 2025-03-03 NOTE — TELEPHONE ENCOUNTER
Lvm for patients daughter in regards to forms    Dr budinetz is completing paperwork requested and needs to know if the leave is continuous or intermittent and he needs dates.

## 2025-03-03 NOTE — TELEPHONE ENCOUNTER
Pt daughter, Devi called.  Relayed provider's message.      Intermittent time as needed.  Patient is starting new position, same employer and the prior paperwork .    FMLA date start is immediate and should be valid for  for one year    Needs ability to work from home as needed to care for her mother AND take off work as needed to be able to take her mother to doctor appointments    Please call Devi once paperwork is completed  [phone]

## 2025-03-05 ENCOUNTER — TELEPHONE (OUTPATIENT)
Dept: FAMILY MEDICINE CLINIC | Facility: CLINIC | Age: 85
End: 2025-03-05

## 2025-03-05 DIAGNOSIS — E78.2 MIXED HYPERLIPIDEMIA: Primary | ICD-10-CM

## 2025-03-05 DIAGNOSIS — Q15.9 EYE ABNORMALITIES: ICD-10-CM

## 2025-03-05 RX ORDER — LATANOPROST 50 UG/ML
1 SOLUTION/ DROPS OPHTHALMIC
Qty: 10 ML | Refills: 3 | Status: SHIPPED | OUTPATIENT
Start: 2025-03-05

## 2025-03-05 RX ORDER — ATORVASTATIN CALCIUM 80 MG/1
80 TABLET, FILM COATED ORAL DAILY
Qty: 100 TABLET | Refills: 3 | Status: SHIPPED | OUTPATIENT
Start: 2025-03-05 | End: 2025-03-10 | Stop reason: SDUPTHER

## 2025-03-05 NOTE — TELEPHONE ENCOUNTER
Pt's daughter returned call from the office, she is requesting that forms be sent via [employer].    Please advise

## 2025-03-05 NOTE — TELEPHONE ENCOUNTER
Pharmacy called the RX Refill Line. Message is being forwarded to the office.     Pharmacy called regarding Lipitor 20 mg. States that the insurance will not cover 4  tablets a day and wanted to see if it can be changed to 80 mg, 1 tablet daily. Also Latanoprost 0.005 opthalmic solution; needs to be changed to 10 ml for a 90 day supply

## 2025-03-06 NOTE — TELEPHONE ENCOUNTER
Lvm for pt's daughter in regards to forms. Asked pts daughter to call our office when she can with the fax number as to where to send the forms

## 2025-03-08 DIAGNOSIS — E78.2 MIXED HYPERLIPIDEMIA: ICD-10-CM

## 2025-03-10 DIAGNOSIS — E78.2 MIXED HYPERLIPIDEMIA: ICD-10-CM

## 2025-03-10 RX ORDER — ATORVASTATIN CALCIUM 20 MG/1
80 TABLET, FILM COATED ORAL DAILY
Qty: 120 TABLET | Refills: 0 | OUTPATIENT
Start: 2025-03-10

## 2025-03-10 RX ORDER — ATORVASTATIN CALCIUM 80 MG/1
80 TABLET, FILM COATED ORAL DAILY
Qty: 100 TABLET | Refills: 0 | Status: SHIPPED | OUTPATIENT
Start: 2025-03-10

## 2025-03-10 NOTE — TELEPHONE ENCOUNTER
Patinets daughter Devi called and stated the   atorvastatin (LIPITOR) 80 mg tablet was called in and her mother cannot swallow those as they are too big It is supposed to be the 20 mg tablets and she takes 4 of them. Devi stated she got a MyChart message stating the medication was denied. She would like to know if this can be refilled due to it not being done correctly and if so snet to the [facility] on [address]

## 2025-03-10 NOTE — TELEPHONE ENCOUNTER
Patients daughter Devi called again to let us know she got another call from [facility] stating the 80 mg tablets were sent there. Her mother cannot take the 80 mg as they are too big.     She needs the atorvastatin (LIPITOR) 20 mg tablets.  She takes 4 pills 1 x day .  Also the prescription needs to be sent to [facility], not [facility].  Devi asked for a callback to discuss further before being resent to make sure it is being done correctly and to the correct pharmacy.

## 2025-03-11 ENCOUNTER — TELEPHONE (OUTPATIENT)
Dept: OTHER | Facility: HOSPITAL | Age: 85
End: 2025-03-11

## 2025-03-11 DIAGNOSIS — E78.2 MIXED HYPERLIPIDEMIA: ICD-10-CM

## 2025-03-11 RX ORDER — ATORVASTATIN CALCIUM 80 MG/1
80 TABLET, FILM COATED ORAL DAILY
Qty: 100 TABLET | Refills: 0 | Status: CANCELLED | OUTPATIENT
Start: 2025-03-11

## 2025-03-11 NOTE — TELEPHONE ENCOUNTER
Patient called the RX Refill Line. Message is being forwarded to the office.     Patients daughter called again for the atorvastatin.  The med that was sent yesterday was for the 80mg tab.  She can not swallow that and she wants that med removed from her chart so this doesn't happen again.  It must be sent as 20 mg tabs and she takes 4 at at time.   The med was also sent to the wrong pharmacy.  [facility].  She's asking for it to be sent to [facility] in [place].  Please address ASAp as she has no meds.     Please contact patients daughter at [phone] with any questions

## 2025-03-11 NOTE — TELEPHONE ENCOUNTER
Patient daughter  Devi is calling back with concerns about previous encounter. Per patient daughter patient only has 1 day left of 20 mg dosage of 4 tablets. Please follow up when prescription has been resent to [facility] pharmacy. Thank you in advance.     Medication Request   atorvastatin 20 mg 4 tablets       Please send to  [facility]  [address] [address]

## 2025-03-11 NOTE — TELEPHONE ENCOUNTER
Patients daughter is calling today stating that the prescription for the atorvastatin 20mg taking (4 ) four tablets= 80mg dose. Patient states her mom is unable to swallow the 80mg tablets. Patient is out of medication.     Please send new prescription to the [facility], [address],[place]

## 2025-03-11 NOTE — TELEPHONE ENCOUNTER
I called the office while daughter was on hold and able to connect to speak to after hours , [place].  To speak to the daughter.

## 2025-03-12 ENCOUNTER — APPOINTMENT (OUTPATIENT)
Dept: LAB | Facility: CLINIC | Age: 85
End: 2025-03-12
Payer: COMMERCIAL

## 2025-03-12 ENCOUNTER — RESULTS FOLLOW-UP (OUTPATIENT)
Dept: FAMILY MEDICINE CLINIC | Facility: CLINIC | Age: 85
End: 2025-03-12

## 2025-03-12 DIAGNOSIS — E03.8 OTHER SPECIFIED HYPOTHYROIDISM: ICD-10-CM

## 2025-03-12 DIAGNOSIS — R79.89 ELEVATED SERUM CREATININE: ICD-10-CM

## 2025-03-12 LAB
ANION GAP SERPL CALCULATED.3IONS-SCNC: 10 MMOL/L (ref 4–13)
BUN SERPL-MCNC: 35 MG/DL (ref 5–25)
CALCIUM SERPL-MCNC: 10.1 MG/DL (ref 8.4–10.2)
CHLORIDE SERPL-SCNC: 106 MMOL/L (ref 96–108)
CO2 SERPL-SCNC: 26 MMOL/L (ref 21–32)
CREAT SERPL-MCNC: 1.45 MG/DL (ref 0.6–1.3)
GFR SERPL CREATININE-BSD FRML MDRD: 32 ML/MIN/1.73SQ M
GLUCOSE P FAST SERPL-MCNC: 107 MG/DL (ref 65–99)
POTASSIUM SERPL-SCNC: 4.6 MMOL/L (ref 3.5–5.3)
SODIUM SERPL-SCNC: 142 MMOL/L (ref 135–147)
T4 FREE SERPL-MCNC: 0.86 NG/DL (ref 0.61–1.12)
TSH SERPL DL<=0.05 MIU/L-ACNC: 5.72 UIU/ML (ref 0.45–4.5)

## 2025-03-12 PROCEDURE — 80048 BASIC METABOLIC PNL TOTAL CA: CPT

## 2025-03-12 PROCEDURE — 84439 ASSAY OF FREE THYROXINE: CPT

## 2025-03-12 PROCEDURE — 84443 ASSAY THYROID STIM HORMONE: CPT

## 2025-03-12 PROCEDURE — 36415 COLL VENOUS BLD VENIPUNCTURE: CPT

## 2025-03-12 NOTE — TELEPHONE ENCOUNTER
Patient's daughter called again regarding prescription for atorvastatin.   Patient needs 20 mg tablets - 4 tablets daily - because she CANNOT SWALLOW the 80 mg tablets. Daughter has called MULTIPLE TIMES to have this fixed.    Per chart, pharmacy called on 3/5 stating that patient's insurance will no longer cover 4 tablets daily of the 20 mg tablets.  Daughter was never told this was the issue.    Patient is now out of medication.    Patient is UNABLE to take the 80 mg tablets.   Provider either needs to apply for a PA/exception to patient's insurance for 4 tablets daily of the 20 mg atorvastatin OR prescribe a different medication that patient can swallow.    Please send to [facility] in [place].    Daughter would like a call back today to advise. [phone]

## 2025-03-13 ENCOUNTER — NURSE TRIAGE (OUTPATIENT)
Age: 85
End: 2025-03-13

## 2025-03-13 ENCOUNTER — TELEPHONE (OUTPATIENT)
Age: 85
End: 2025-03-13

## 2025-03-13 DIAGNOSIS — I48.20 CHRONIC ATRIAL FIBRILLATION (HCC): ICD-10-CM

## 2025-03-13 DIAGNOSIS — E78.2 MIXED HYPERLIPIDEMIA: ICD-10-CM

## 2025-03-13 RX ORDER — ATORVASTATIN CALCIUM 20 MG/1
80 TABLET, FILM COATED ORAL DAILY
Qty: 360 TABLET | Refills: 1 | Status: SHIPPED | OUTPATIENT
Start: 2025-03-13

## 2025-03-13 RX ORDER — FUROSEMIDE 20 MG/1
20 TABLET ORAL EVERY OTHER DAY
Qty: 180 TABLET | Refills: 1 | Status: SHIPPED | OUTPATIENT
Start: 2025-03-13

## 2025-03-13 RX ORDER — ATORVASTATIN CALCIUM 20 MG/1
80 TABLET, FILM COATED ORAL DAILY
Qty: 360 TABLET | Refills: 1 | Status: SHIPPED | OUTPATIENT
Start: 2025-03-13 | End: 2025-03-13 | Stop reason: SDUPTHER

## 2025-03-13 NOTE — TELEPHONE ENCOUNTER
Patient's daughter called again regarding prescription for atorvastatin.   Patient needs 20 mg tablets - 4 tablets daily - because she CANNOT SWALLOW the 80 mg tablets. Daughter has called MULTIPLE TIMES to have this fixed.     Per chart, pharmacy called on 3/5 stating that patient's insurance will no longer cover 4 tablets daily of the 20 mg tablets.  Daughter was never told this was the issue.     Patient is now out of medication.     Patient is UNABLE to take the 80 mg tablets.   Provider either needs to apply for a PA/exception to patient's insurance for 4 tablets daily of the 20 mg atorvastatin OR prescribe a different medication that patient can swallow.     Please send to [facility] in [place].     Daughter would like a call back today to advise. [phone]      -Routing this call from yesterday again so that it can be addressed.

## 2025-03-13 NOTE — TELEPHONE ENCOUNTER
Regarding: Medication problem  ----- Message from Eleanor ISAACS sent at 3/13/2025 11:57 AM EDT -----  Pts daughter called and stated that the atorvastatin 20 mgs and the furosemide (LASIX) 20 mg tablet were send to the wrong pharmacy and they need to pick it up this morning. Please send to the [id number] - [place], [place] - [address] [place] [address] [[id number]] as soon as possible. Please and thank you.

## 2025-03-13 NOTE — TELEPHONE ENCOUNTER
Medication sent to [facility] in [place], spoke with pharmacy staff to make sure prescription can be filled, they stated that they are going to try to have the phamacist use a different code and see if they insurance will cover the 20MG tabs due to patient not being able to swallow them. If that dose not work they will initiate a prior authorization. They will call us back to let us know what will be done, as soon as they speak to the parmacist. LM for patients daughter with information

## 2025-03-13 NOTE — TELEPHONE ENCOUNTER
"FOLLOW UP: With pharmacy    REASON FOR CONVERSATION: Medication Problem    SYMPTOMS: N/A    OTHER: Med sent to correct pharmacy     DISPOSITION: Home Care      Reason for Disposition   Prescription prescribed recently is not at pharmacy and triager has access to patient's EMR and prescription is recorded in the EMR    Answer Assessment - Initial Assessment Questions  1. NAME of MEDICINE: \"What medicine(s) are you calling about?\"      Lasix   2. QUESTION: \"What is your question?\" (e.g., double dose of medicine, side effect)      Pharmacy change   3. PRESCRIBER: \"Who prescribed the medicine?\" Reason: if prescribed by specialist, call should be referred to that group.      PCP  4. SYMPTOMS: \"Do you have any symptoms?\" If Yes, ask: \"What symptoms are you having?\"  \"How bad are the symptoms (e.g., mild, moderate, severe)      N/A  5. PREGNANCY:  \"Is there any chance that you are pregnant?\" \"When was your last menstrual period?\"      N/A    Protocols used: Medication Question Call-Adult-[place]    "

## 2025-03-14 ENCOUNTER — RESULTS FOLLOW-UP (OUTPATIENT)
Dept: OTHER | Facility: HOSPITAL | Age: 85
End: 2025-03-14

## 2025-03-19 DIAGNOSIS — E03.8 OTHER SPECIFIED HYPOTHYROIDISM: ICD-10-CM

## 2025-03-19 DIAGNOSIS — I49.9 CARDIAC ARRHYTHMIA, UNSPECIFIED CARDIAC ARRHYTHMIA TYPE: Primary | ICD-10-CM

## 2025-03-20 RX ORDER — LEVOTHYROXINE SODIUM 25 UG/1
25 TABLET ORAL DAILY
Qty: 90 TABLET | Refills: 1 | Status: SHIPPED | OUTPATIENT
Start: 2025-03-20

## 2025-03-20 RX ORDER — AMIODARONE HYDROCHLORIDE 100 MG/1
100 TABLET ORAL DAILY
Qty: 90 TABLET | Refills: 3 | Status: SHIPPED | OUTPATIENT
Start: 2025-03-20 | End: 2025-03-23 | Stop reason: SDUPTHER

## 2025-03-23 DIAGNOSIS — I49.9 CARDIAC ARRHYTHMIA, UNSPECIFIED CARDIAC ARRHYTHMIA TYPE: ICD-10-CM

## 2025-03-24 RX ORDER — AMIODARONE HYDROCHLORIDE 100 MG/1
100 TABLET ORAL DAILY
Qty: 90 TABLET | Refills: 0 | Status: SHIPPED | OUTPATIENT
Start: 2025-03-24

## 2025-04-01 ENCOUNTER — NURSE TRIAGE (OUTPATIENT)
Dept: OTHER | Facility: OTHER | Age: 85
End: 2025-04-01

## 2025-04-01 NOTE — TELEPHONE ENCOUNTER
"Regarding: fall  ----- Message from Nehal FISH sent at 4/1/2025  5:08 AM EDT -----  \"My mother fell and she hit her head. She is on Eliquis, should I take her to the ER?\"    "

## 2025-04-01 NOTE — TELEPHONE ENCOUNTER
"FOLLOW UP: Please follow up with patient and daughter in the morning    REASON FOR CONVERSATION: Fall    SYMPTOMS: Fall- hit head on side table. Patient takes eliquis. Denies loss of consciousness.    OTHER: N/A    DISPOSITION: Go to ED Now (or PCP Triage)    Per protocol advised ER visit for evaluation.  Daughter and patient agreeable to disposition.    Reason for Disposition   Taking Coumadin (warfarin) or other strong blood thinner, or known bleeding disorder (e.g., thrombocytopenia)    Answer Assessment - Initial Assessment Questions  1. MECHANISM: \"How did the injury happen?\" For falls, ask: \"What height did you fall from?\" and \"What surface did you fall against?\"         Lost balance and hit head on side table    2. ONSET: \"When did the injury happen?\" (e.g., minutes, hours ago)         45 min ago     3. NEUROLOGIC SYMPTOMS: \"Was there any loss of consciousness?\" \"Are there any other neurological symptoms?\"         Denies    4. MENTAL STATUS: \"Does the person know who they are, who you are, and where they are?\"         \"She's fine\" per daughter    5. LOCATION: \"What part of the head was hit?\"         \"Very top of her head\". Above left eyebrow    6. SCALP APPEARANCE: \"What does the scalp look like? Is it bleeding now?\" If Yes, ask: \"Is it difficult to stop?\"         Denies    7. SIZE: For cuts, bruises, or swelling, ask: \"How large is it?\" (e.g., inches or centimeters)         Scrapes     8. PAIN: \"Is there any pain?\" If Yes, ask: \"How bad is it?\" (Scale 0-10; or none, mild, moderate, severe)        Denies    10. BLOOD THINNERS: \"Do you take any blood thinners?\" (e.g., aspirin, clopidogrel / Plavix, coumadin, heparin). Notes: Other strong blood thinners include: Arixtra (fondaparinux), Eliquis (apixaban), Pradaxa (dabigatran), and Xarelto (rivaroxaban).          Eliquis    11. OTHER SYMPTOMS: \"Do you have any other symptoms?\" (e.g., neck pain, vomiting)          Denies    Protocols used: Head " Injury-Adult-AH

## 2025-04-03 DIAGNOSIS — I48.91 NEW ONSET A-FIB (HCC): ICD-10-CM

## 2025-04-04 RX ORDER — MULTIVIT-MIN/IRON/FOLIC ACID/K 18-600-40
1 CAPSULE ORAL DAILY
Qty: 30 TABLET | Refills: 0 | Status: SHIPPED | OUTPATIENT
Start: 2025-04-04

## 2025-04-10 DIAGNOSIS — I48.91 NEW ONSET A-FIB (HCC): ICD-10-CM

## 2025-04-10 RX ORDER — APIXABAN 5 MG/1
5 TABLET, FILM COATED ORAL 2 TIMES DAILY
Qty: 180 TABLET | Refills: 1 | Status: SHIPPED | OUTPATIENT
Start: 2025-04-10

## 2025-05-04 DIAGNOSIS — I48.91 NEW ONSET A-FIB (HCC): ICD-10-CM

## 2025-05-05 RX ORDER — MULTIVIT-MIN/IRON/FOLIC ACID/K 18-600-40
1 CAPSULE ORAL DAILY
Qty: 30 TABLET | Refills: 5 | Status: SHIPPED | OUTPATIENT
Start: 2025-05-05

## 2025-05-16 DIAGNOSIS — K21.9 GASTROESOPHAGEAL REFLUX DISEASE WITHOUT ESOPHAGITIS: ICD-10-CM

## 2025-05-16 RX ORDER — FAMOTIDINE 20 MG/1
20 TABLET, FILM COATED ORAL DAILY
Qty: 90 TABLET | Refills: 1 | Status: SHIPPED | OUTPATIENT
Start: 2025-05-16

## 2025-05-18 DIAGNOSIS — J12.82 PNEUMONIA DUE TO COVID-19 VIRUS: ICD-10-CM

## 2025-05-18 DIAGNOSIS — U07.1 PNEUMONIA DUE TO COVID-19 VIRUS: ICD-10-CM

## 2025-05-18 RX ORDER — NADOLOL 20 MG/1
20 TABLET ORAL DAILY
Qty: 90 TABLET | Refills: 1 | Status: SHIPPED | OUTPATIENT
Start: 2025-05-18

## 2025-05-27 DIAGNOSIS — J12.82 PNEUMONIA DUE TO COVID-19 VIRUS: ICD-10-CM

## 2025-05-27 DIAGNOSIS — U07.1 PNEUMONIA DUE TO COVID-19 VIRUS: ICD-10-CM

## 2025-05-28 RX ORDER — NADOLOL 20 MG/1
20 TABLET ORAL DAILY
Qty: 90 TABLET | Refills: 1 | Status: SHIPPED | OUTPATIENT
Start: 2025-05-28

## 2025-06-12 DIAGNOSIS — I49.9 CARDIAC ARRHYTHMIA, UNSPECIFIED CARDIAC ARRHYTHMIA TYPE: ICD-10-CM

## 2025-06-12 RX ORDER — AMIODARONE HYDROCHLORIDE 100 MG/1
100 TABLET ORAL DAILY
Qty: 90 TABLET | Refills: 0 | Status: SHIPPED | OUTPATIENT
Start: 2025-06-12

## 2025-07-02 DIAGNOSIS — R79.89 ELEVATED SERUM CREATININE: Primary | ICD-10-CM

## 2025-07-07 ENCOUNTER — APPOINTMENT (OUTPATIENT)
Dept: LAB | Facility: CLINIC | Age: 85
End: 2025-07-07
Payer: COMMERCIAL

## 2025-07-07 DIAGNOSIS — I48.0 PAROXYSMAL ATRIAL FIBRILLATION (HCC): ICD-10-CM

## 2025-07-07 DIAGNOSIS — R79.89 ELEVATED SERUM CREATININE: ICD-10-CM

## 2025-07-07 LAB
ANION GAP SERPL CALCULATED.3IONS-SCNC: 9 MMOL/L (ref 4–13)
BACTERIA UR QL AUTO: NORMAL /HPF
BASOPHILS # BLD AUTO: 0.05 THOUSANDS/ÂΜL (ref 0–0.1)
BASOPHILS NFR BLD AUTO: 1 % (ref 0–1)
BILIRUB UR QL STRIP: NEGATIVE
BUN SERPL-MCNC: 33 MG/DL (ref 5–25)
CALCIUM SERPL-MCNC: 9.6 MG/DL (ref 8.4–10.2)
CHLORIDE SERPL-SCNC: 106 MMOL/L (ref 96–108)
CLARITY UR: CLEAR
CO2 SERPL-SCNC: 25 MMOL/L (ref 21–32)
COLOR UR: NORMAL
CREAT SERPL-MCNC: 1.42 MG/DL (ref 0.6–1.3)
CREAT UR-MCNC: 86 MG/DL
EOSINOPHIL # BLD AUTO: 0.19 THOUSAND/ÂΜL (ref 0–0.61)
EOSINOPHIL NFR BLD AUTO: 3 % (ref 0–6)
ERYTHROCYTE [DISTWIDTH] IN BLOOD BY AUTOMATED COUNT: 15.9 % (ref 11.6–15.1)
GFR SERPL CREATININE-BSD FRML MDRD: 33 ML/MIN/1.73SQ M
GLUCOSE P FAST SERPL-MCNC: 106 MG/DL (ref 65–99)
GLUCOSE UR STRIP-MCNC: NEGATIVE MG/DL
HCT VFR BLD AUTO: 33.2 % (ref 34.8–46.1)
HGB BLD-MCNC: 9.6 G/DL (ref 11.5–15.4)
HGB UR QL STRIP.AUTO: NEGATIVE
IMM GRANULOCYTES # BLD AUTO: 0.04 THOUSAND/UL (ref 0–0.2)
IMM GRANULOCYTES NFR BLD AUTO: 1 % (ref 0–2)
KETONES UR STRIP-MCNC: NEGATIVE MG/DL
LEUKOCYTE ESTERASE UR QL STRIP: NEGATIVE
LYMPHOCYTES # BLD AUTO: 1.73 THOUSANDS/ÂΜL (ref 0.6–4.47)
LYMPHOCYTES NFR BLD AUTO: 26 % (ref 14–44)
MAGNESIUM SERPL-MCNC: 2 MG/DL (ref 1.9–2.7)
MCH RBC QN AUTO: 25.8 PG (ref 26.8–34.3)
MCHC RBC AUTO-ENTMCNC: 28.9 G/DL (ref 31.4–37.4)
MCV RBC AUTO: 89 FL (ref 82–98)
MONOCYTES # BLD AUTO: 0.72 THOUSAND/ÂΜL (ref 0.17–1.22)
MONOCYTES NFR BLD AUTO: 11 % (ref 4–12)
NEUTROPHILS # BLD AUTO: 3.94 THOUSANDS/ÂΜL (ref 1.85–7.62)
NEUTS SEG NFR BLD AUTO: 58 % (ref 43–75)
NITRITE UR QL STRIP: NEGATIVE
NON-SQ EPI CELLS URNS QL MICRO: NORMAL /HPF
NRBC BLD AUTO-RTO: 0 /100 WBCS
PH UR STRIP.AUTO: 6.5 [PH]
PLATELET # BLD AUTO: 251 THOUSANDS/UL (ref 149–390)
PMV BLD AUTO: 11.3 FL (ref 8.9–12.7)
POTASSIUM SERPL-SCNC: 4.7 MMOL/L (ref 3.5–5.3)
PROT UR STRIP-MCNC: NEGATIVE MG/DL
PROT UR-MCNC: 10.7 MG/DL
PROT/CREAT UR: 0.1 MG/G{CREAT}
RBC # BLD AUTO: 3.72 MILLION/UL (ref 3.81–5.12)
RBC #/AREA URNS AUTO: NORMAL /HPF
SODIUM SERPL-SCNC: 140 MMOL/L (ref 135–147)
SP GR UR STRIP.AUTO: 1.02 (ref 1–1.03)
TSH SERPL DL<=0.05 MIU/L-ACNC: 4.66 UIU/ML (ref 0.45–4.5)
UROBILINOGEN UR STRIP-ACNC: <2 MG/DL
WBC # BLD AUTO: 6.67 THOUSAND/UL (ref 4.31–10.16)
WBC #/AREA URNS AUTO: NORMAL /HPF

## 2025-07-07 PROCEDURE — 80048 BASIC METABOLIC PNL TOTAL CA: CPT

## 2025-07-07 PROCEDURE — 36415 COLL VENOUS BLD VENIPUNCTURE: CPT

## 2025-07-07 PROCEDURE — 84156 ASSAY OF PROTEIN URINE: CPT

## 2025-07-07 PROCEDURE — 85025 COMPLETE CBC W/AUTO DIFF WBC: CPT

## 2025-07-07 PROCEDURE — 84443 ASSAY THYROID STIM HORMONE: CPT

## 2025-07-07 PROCEDURE — 81001 URINALYSIS AUTO W/SCOPE: CPT

## 2025-07-07 PROCEDURE — 82570 ASSAY OF URINE CREATININE: CPT

## 2025-07-07 PROCEDURE — 83735 ASSAY OF MAGNESIUM: CPT

## 2025-07-10 ENCOUNTER — RA CDI HCC (OUTPATIENT)
Dept: RADIOLOGY | Facility: HOSPITAL | Age: 85
End: 2025-07-10

## 2025-07-14 ENCOUNTER — OFFICE VISIT (OUTPATIENT)
Age: 85
End: 2025-07-14
Payer: COMMERCIAL

## 2025-07-14 VITALS
WEIGHT: 166 LBS | SYSTOLIC BLOOD PRESSURE: 134 MMHG | HEIGHT: 63 IN | HEART RATE: 80 BPM | DIASTOLIC BLOOD PRESSURE: 64 MMHG | TEMPERATURE: 97.6 F | OXYGEN SATURATION: 97 % | BODY MASS INDEX: 29.41 KG/M2

## 2025-07-14 DIAGNOSIS — E53.8 VITAMIN B12 DEFICIENCY: ICD-10-CM

## 2025-07-14 DIAGNOSIS — N18.32 CKD STAGE 3B, GFR 30-44 ML/MIN (HCC): ICD-10-CM

## 2025-07-14 DIAGNOSIS — E53.8 FOLIC ACID DEFICIENCY: ICD-10-CM

## 2025-07-14 DIAGNOSIS — G62.9 NEUROPATHY: ICD-10-CM

## 2025-07-14 DIAGNOSIS — K21.9 GASTROESOPHAGEAL REFLUX DISEASE, UNSPECIFIED WHETHER ESOPHAGITIS PRESENT: ICD-10-CM

## 2025-07-14 DIAGNOSIS — E61.1 IRON DEFICIENCY: Primary | ICD-10-CM

## 2025-07-14 DIAGNOSIS — M1A.00X0 IDIOPATHIC CHRONIC GOUT WITHOUT TOPHUS, UNSPECIFIED SITE: ICD-10-CM

## 2025-07-14 DIAGNOSIS — N25.81 SECONDARY HYPERPARATHYROIDISM OF RENAL ORIGIN (HCC): ICD-10-CM

## 2025-07-14 PROCEDURE — 99214 OFFICE O/P EST MOD 30 MIN: CPT | Performed by: NURSE PRACTITIONER

## 2025-07-15 ENCOUNTER — RA CDI HCC (OUTPATIENT)
Dept: OTHER | Facility: HOSPITAL | Age: 85
End: 2025-07-15

## 2025-07-16 ENCOUNTER — APPOINTMENT (OUTPATIENT)
Age: 85
End: 2025-07-16
Attending: NURSE PRACTITIONER
Payer: COMMERCIAL

## 2025-07-16 LAB
FERRITIN SERPL-MCNC: 11 NG/ML (ref 30–307)
FOLATE SERPL-MCNC: >22.3 NG/ML
HGB BLD-MCNC: 10 G/DL (ref 11.5–15.4)
IRON SATN MFR SERPL: 5 % (ref 15–50)
IRON SERPL-MCNC: 20 UG/DL (ref 50–212)
TIBC SERPL-MCNC: 418.6 UG/DL (ref 250–450)
TRANSFERRIN SERPL-MCNC: 299 MG/DL (ref 203–362)
UIBC SERPL-MCNC: 399 UG/DL (ref 155–355)
VIT B12 SERPL-MCNC: 152 PG/ML (ref 180–914)

## 2025-07-16 PROCEDURE — 82607 VITAMIN B-12: CPT | Performed by: NURSE PRACTITIONER

## 2025-07-16 PROCEDURE — 83540 ASSAY OF IRON: CPT | Performed by: NURSE PRACTITIONER

## 2025-07-16 PROCEDURE — 82728 ASSAY OF FERRITIN: CPT | Performed by: NURSE PRACTITIONER

## 2025-07-16 PROCEDURE — 83550 IRON BINDING TEST: CPT | Performed by: NURSE PRACTITIONER

## 2025-07-16 PROCEDURE — 82746 ASSAY OF FOLIC ACID SERUM: CPT | Performed by: NURSE PRACTITIONER

## 2025-07-16 PROCEDURE — 36415 COLL VENOUS BLD VENIPUNCTURE: CPT | Performed by: NURSE PRACTITIONER

## 2025-07-16 PROCEDURE — 85018 HEMOGLOBIN: CPT | Performed by: NURSE PRACTITIONER

## 2025-07-18 ENCOUNTER — RESULTS FOLLOW-UP (OUTPATIENT)
Dept: NEPHROLOGY | Facility: CLINIC | Age: 85
End: 2025-07-18

## 2025-07-18 NOTE — TELEPHONE ENCOUNTER
I called and left a VM for Devi to contact the office to discuss Malgorzata's recent lab work/ provider recommendations:      ----- Message from MARTIN Peoples sent at 7/18/2025 10:29 AM EDT -----  Please let patient know vitamin B12 is low and iron panel is low.  Blood count is stable and slightly improved from 2 weeks ago.    Please ask if she is taking vitamin B12 injections and if not would she like me to order them for her?  Usually is once a week x 4 and then monthly    As far as iron goes, given stability in hemoglobin she can use heme polypeptide also known as proferrin 1 tablet/day.  This is found over-the-counter usually on [employer].  It is much more potent than   ferrous sulfate.  It is usually well-tolerated with minimal side effects.    If she cannot tolerate the oral supplement I will place an order for IV iron if she would prefer  ----- Message -----  From: Lab, Background User  Sent: 7/16/2025   7:42 PM EDT  To: MARTIN Peoples

## 2025-07-18 NOTE — RESULT ENCOUNTER NOTE
I attempted to contact Devi regarding Infusion Center preference for B12 injection but no answer. The phone just rang and rang before disconnecting/dropping the call.  I will try to call her again later to discuss.

## 2025-07-18 NOTE — PROGRESS NOTES
[facility]   Office Follow-Up  Malgorzata Mims 85 y.o. female MRN: [id number]    Encounter: [id number]        Assessment & Plan    Malgorzata Mims was seen in the [place] office today. All diagnoses and orders for visit:     Assessment & Plan  CKD stage 3b, GFR 30-44 ml/min ([facility])  Lab Results   Component Value Date    EGFR 33 07/07/2025    EGFR 32 03/12/2025    EGFR 23 02/14/2025    CREATININE 1.42 (H) 07/07/2025    CREATININE 1.45 (H) 03/12/2025    CREATININE 1.87 (H) 02/14/2025   Baseline creatinine 1.4 mg/dL dating back to 2018.  UA significant for 1-2 RBC otherwise bland.  UPCR 0.1.  Renal ultrasound without obstructive uropathy done last year.  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  Patient with neuropathy and fatigue-check iron studies now and treat as indicated  Use Lasix to every other day at last visit-doing well with this.  Noted edema 1 time otherwise no significant edema on exam  Continue to avoid NSAIDs  She will let me know if she cannot tolerate being off of PPI  Iron deficiency  Check iron panel and treat as indicated  Vitamin B12 deficiency  Check vitamin B12 and treat as indicated  Folic acid deficiency  Check folic acid and treat as indicated  Idiopathic chronic gout without tophus, unspecified site  Continue allopurinol and maintain a low-protein diet  Neuropathy  Check vitamin B12  Secondary hyperparathyroidism of renal origin ([facility])  No indication for activated vitamin D agent at present  Gastroesophageal reflux disease, unspecified whether esophagitis present  Does have intermittent GERD now that she is off PPI.  Offered switching back to PPI from Pepcid and she declined at this time.  Will continue to monitor symptoms and she will let me know if GERD gets worse        HPI: Malgorzata Mims is a 85 y.o. female who is here for scheduled follow-up    Pertinent medical problems include: CKD 3B, GERD,  "anemia,    Reduce Lasix to every other day at last visit and switched PPI to Pepcid.  Edema is controlled.  GERD has increased but she prefers to continue on Pepcid.  Kidney function is back to her typical baseline.  She has fatigue and neuropathy-we will check anemia workup      ROS:   Review of Systems   Constitutional:  Positive for fatigue. Negative for chills and fever.        Neuropathy   HENT:  Negative for ear pain and sore throat.    Eyes:  Negative for pain and visual disturbance.   Respiratory:  Negative for cough and shortness of breath.    Cardiovascular:  Negative for chest pain and palpitations.   Gastrointestinal:  Negative for abdominal pain and vomiting.   Genitourinary:  Negative for dysuria and hematuria.   Musculoskeletal:  Negative for arthralgias and back pain.   Skin:  Negative for color change and rash.   Neurological:  Negative for seizures and syncope.   All other systems reviewed and are negative.      Allergies: Statins, Amoxicillin, Sulfa antibiotics, Sulfur, and Levofloxacin    Medications: Current Medications[1]    Past Medical History[2]  Past Surgical History[3]  Family History[4]   reports that she has never smoked. She has never been exposed to tobacco smoke. She has never used smokeless tobacco. She reports that she does not currently use alcohol. She reports that she does not use drugs.      Physical Exam:   Vitals:    07/14/25 1554   BP: 134/64   BP Location: Left arm   Patient Position: Sitting   Cuff Size: Standard   Pulse: 80   Temp: 97.6 °F (36.4 °C)   SpO2: 97%   Weight: 75.3 kg (166 lb)   Height: 5' 3\" (1.6 m)     Body mass index is 29.41 kg/m².    General: conscious, cooperative, in no acute distress, appears stated age  Eyes: conjunctivae pale, anicteric sclerae  ENT: lips and mucous membranes moist  Neck: supple, no JVD, no masses  Chest:  essentially clear breath sounds bilaterally, no crackles, ronchus or wheezings  CVS: S1 & S2, normal rate, regular rhythm  Abdomen: " "soft, non-tender, non-distended, normoactive bowel sounds, rounded  Extremities: no edema of both legs  Skin: no rash   Neuro: awake, alert, oriented       Diagnostic Data:  Lab: I have personally reviewed pertinent lab results.,   CBC:  Results from last 7 days   Lab Units 07/16/25  1238   HEMOGLOBIN g/dL 10.0*      CMP: No results found for: \"SODIUM\", \"K\", \"CL\", \"CO2\", \"ANIONGAP\", \"BUN\", \"CREATININE\", \"GLUCOSE\", \"CALCIUM\", \"AST\", \"ALT\", \"ALKPHOS\", \"PROT\", \"BILITOT\", \"EGFR\",   PT/INR: No results found for: \"PT\", \"INR\",   Magnesium: No components found for: \"MAG\",  Phosphorous: No results found for: \"PHOS\"    Patient Instructions   Check iron work-up     Portions of the record may have been created with voice recognition software. Occasional wrong word or \"sound a like\" substitutions may have occurred due to the inherent limitations of voice recognition software. Read the chart carefully and recognize, using context, where substitutions have occurred.    If you have any questions, please contact the dictating provider.       [1]   Current Outpatient Medications:     acetaminophen (TYLENOL) 650 mg CR tablet, Take 650 mg by mouth, Disp: , Rfl:     allopurinol (ZYLOPRIM) 100 mg tablet, Take 1 tablet (100 mg total) by mouth daily, Disp: 90 tablet, Rfl: 1    aspirin (Aspirin Low Dose) 81 mg EC tablet, Take 1 tablet (81 mg total) by mouth daily, Disp: 100 tablet, Rfl: 1    atorvastatin (LIPITOR) 20 mg tablet, Take 4 tablets (80 mg total) by mouth daily, Disp: 360 tablet, Rfl: 1    Eliquis 5 MG, Take 1 tablet (5 mg total) by mouth 2 (two) times a day, Disp: 180 tablet, Rfl: 1    famotidine (PEPCID) 20 mg tablet, Take 1 tablet (20 mg total) by mouth daily, Disp: 90 tablet, Rfl: 1    fluticasone (FLONASE) 50 mcg/act nasal spray, 1 spray into each nostril in the morning., Disp: , Rfl:     furosemide (LASIX) 20 mg tablet, Take 1 tablet (20 mg total) by mouth every other day, Disp: 180 tablet, Rfl: 1    latanoprost (XALATAN) " 0.005 % ophthalmic solution, Administer 1 drop to both eyes daily at bedtime, Disp: 10 mL, Rfl: 3    levothyroxine (Synthroid) 25 mcg tablet, Take 1 tablet (25 mcg total) by mouth daily, Disp: 90 tablet, Rfl: 1    nadolol (CORGARD) 20 mg tablet, Take 1 tablet (20 mg total) by mouth daily, Disp: 90 tablet, Rfl: 1    Pacerone 100 MG tablet, Take 1 tablet (100 mg total) by mouth daily, Disp: 90 tablet, Rfl: 0    senna (SENOKOT) 8.6 mg, Take 2 tablets (17.2 mg total) by mouth 2 (two) times a day, Disp: 360 tablet, Rfl: 1    venlafaxine (EFFEXOR-XR) 37.5 mg 24 hr capsule, Take 1 capsule by mouth once daily, Disp: 90 capsule, Rfl: 1    Vitamin D, Cholecalciferol, 25 MCG (1000 UT) TABS, Take 1 tablet (1,000 Units total) by mouth daily, Disp: 30 tablet, Rfl: 5    atorvastatin (LIPITOR) 80 mg tablet, Take 1 tablet (80 mg total) by mouth daily, Disp: 100 tablet, Rfl: 0  [2]   Past Medical History:  Diagnosis Date    Afib (HCC)     SUSANA (acute kidney injury) (HCC) 05/03/2021    Arthritis     Gastro-esophageal reflux disease with esophagitis 11/30/2022    GERD (gastroesophageal reflux disease)     Hyperkalemia 07/05/2023    Hyperlipidemia     Hypertension     Hyponatremia 05/03/2021    Stroke (HCC)    [3]   Past Surgical History:  Procedure Laterality Date    CARDIAC PACEMAKER PLACEMENT Left 02/09/2024    NO PAST SURGERIES      SINUS SURGERY     [4] No family history on file.

## 2025-07-18 NOTE — TELEPHONE ENCOUNTER
Daughter returning call and made aware:    --- Message from MARTIN Peoples sent at 7/18/2025 10:29 AM EDT -----  Please let patient know vitamin B12 is low and iron panel is low.  Blood count is stable and slightly improved from 2 weeks ago.     Please ask if she is taking vitamin B12 injections and if not would she like me to order them for her?  Usually is once a week x 4 and then monthly     As far as iron goes, given stability in hemoglobin she can use heme polypeptide also known as proferrin 1 tablet/day.  This is found over-the-counter usually on [employer].  It is much more potent than   ferrous sulfate.  It is usually well-tolerated with minimal side effects.     If she cannot tolerate the oral supplement I will place an order for IV iron if she would prefer    Daughter verbalized understanding. States B12 injection rx can be sent to [facility] and she will buy proferrin and call in a few weeks if patient isn't tolerating it.

## 2025-07-21 NOTE — RESULT ENCOUNTER NOTE
I called and spoke with Devi. She states none of the infusion centers listed are close to Malgorzata's home in [place]. She was under the impression that the injections was something she could get filled at a pharmacy and give Malgorzata at home. She was inquiring if this is something [place] could get done at her PCP office which is only a 10-15 minute ride from her home.      Please advise.

## 2025-07-23 ENCOUNTER — OFFICE VISIT (OUTPATIENT)
Dept: FAMILY MEDICINE CLINIC | Facility: CLINIC | Age: 85
End: 2025-07-23
Payer: COMMERCIAL

## 2025-07-23 VITALS
HEART RATE: 76 BPM | BODY MASS INDEX: 29.44 KG/M2 | OXYGEN SATURATION: 98 % | HEIGHT: 63 IN | DIASTOLIC BLOOD PRESSURE: 74 MMHG | WEIGHT: 166.13 LBS | SYSTOLIC BLOOD PRESSURE: 128 MMHG | TEMPERATURE: 97.6 F

## 2025-07-23 DIAGNOSIS — Z00.00 MEDICARE ANNUAL WELLNESS VISIT, SUBSEQUENT: Primary | ICD-10-CM

## 2025-07-23 DIAGNOSIS — N18.4 CKD (CHRONIC KIDNEY DISEASE) STAGE 4, GFR 15-29 ML/MIN (HCC): ICD-10-CM

## 2025-07-23 DIAGNOSIS — E53.8 B12 DEFICIENCY: ICD-10-CM

## 2025-07-23 PROCEDURE — G0439 PPPS, SUBSEQ VISIT: HCPCS | Performed by: FAMILY MEDICINE

## 2025-07-23 RX ORDER — CYANOCOBALAMIN 1000 UG/ML
1000 INJECTION, SOLUTION INTRAMUSCULAR; SUBCUTANEOUS ONCE
Qty: 1 ML | Refills: 3 | Status: SHIPPED | OUTPATIENT
Start: 2025-07-23 | End: 2025-07-23

## 2025-07-23 RX ORDER — CYANOCOBALAMIN 1000 UG/ML
1000 INJECTION, SOLUTION INTRAMUSCULAR; SUBCUTANEOUS
Status: DISCONTINUED | OUTPATIENT
Start: 2025-07-23 | End: 2025-07-23

## 2025-07-23 RX ORDER — IRON HEME POLYPEPTIDE/FOLIC AC 12-1MG
TABLET ORAL
COMMUNITY

## 2025-07-23 NOTE — PROGRESS NOTES
Name: Malgorzata Mims      : 1940      MRN: [id number]  Encounter Provider: Robert Budinetz, MD  Encounter Date: 2025   Encounter department: [facility]  :  Assessment & Plan  Medicare annual wellness visit, subsequent       Reviewed age-appropriate health maintenance and preventive care.    B12 deficiency  Start injections monthly  Orders:    cyanocobalamin 1,000 mcg/mL; Inject 1 mL (1,000 mcg total) into a muscle once for 1 dose    CKD (chronic kidney disease) stage 4, GFR 15-29 ml/min ([facility])  Lab Results   Component Value Date    EGFR 33 2025    EGFR 32 2025    EGFR 23 2025    CREATININE 1.42 (H) 2025    CREATININE 1.45 (H) 2025    CREATININE 1.87 (H) 2025          Continue nephrology follow-up     Preventive health issues were discussed with patient, and age appropriate screening tests were ordered as noted in patient's After Visit Summary. Personalized health advice and appropriate referrals for health education or preventive services given if needed, as noted in patient's After Visit Summary.    History of Present Illness     The patient is here for an annual Medicare.  She is stable and doing well her renal function stable she follows with nephrology.  They would like her on B12 injections as she is deficient which we will start she has no active chest pain or shortness of breath blood pressure looks good she does have some balance issues she has a walker and a cane and is it not falling recently.  Her mental status is okay her memory is an issue since her stroke but it is manageable she lives with her daughter and is having good supports at home.       Patient Care Team:  Robert Budinetz, MD as PCP - General (Family Medicine)  Jaron Noble MD as PCP - [facility]CP-[facility] ([facility])  Robert Budinetz, MD as PCP - PCP-[facility] ([facility])  Alfonzo Mandujano DO (Nephrology)  MARTIN Peoples as Nurse Practitioner (Nephrology)    Review of  Systems   Respiratory: Negative.     Cardiovascular: Negative.      Medical History Reviewed by provider this encounter:  Tobacco  Allergies  Meds  Problems  Med Hx  Surg Hx  Fam Hx       Annual Wellness Visit Questionnaire   Malgorzata is here for her Subsequent Wellness visit. Last Medicare Wellness visit information reviewed, patient interviewed, no change since last AWV.     Health Risk Assessment:   Patient rates overall health as good. Patient feels that their physical health rating is same. Patient is satisfied with their life. Eyesight was rated as same. Hearing was rated as same. Patient feels that their emotional and mental health rating is same. Patients states they are never, rarely angry. Patient states they are never, rarely unusually tired/fatigued. Pain experienced in the last 7 days has been none. Patient states that she has experienced no weight loss or gain in last 6 months.     Depression Screening:   PHQ-9 Score: 3      Fall Risk Screening:   In the past year, patient has experienced: history of falling in past year    Number of falls: 1  Injured during fall?: Yes    Feels unsteady when standing or walking?: Yes    Worried about falling?: Yes      Urinary Incontinence Screening:   Patient has not leaked urine accidently in the last six months.     Home Safety:  Patient does not have trouble with stairs inside or outside of their home. Patient has working smoke alarms and has working carbon monoxide detector. Home safety hazards include: none.     Nutrition:   Current diet is Regular.     Medications:   Patient is currently taking over-the-counter supplements. OTC medications include: see medication list. Patient is able to manage medications.     Activities of Daily Living (ADLs)/Instrumental Activities of Daily Living (IADLs):   Walk and transfer into and out of bed and chair?: Yes  Dress and groom yourself?: Yes    Bathe or shower yourself?: Yes    Feed yourself? Yes  Do your  laundry/housekeeping?: Yes  Manage your money, pay your bills and track your expenses?: Yes  Make your own meals?: Yes    Do your own shopping?: Yes    Previous Hospitalizations:   Any hospitalizations or ED visits within the last 12 months?: Yes    How many hospitalizations have you had in the last year?: 1-2    Advance Care Planning:   Living will: Yes    Durable POA for healthcare: Yes    Advanced directive: Yes      Preventive Screenings      Cardiovascular Screening:    General: Screening Not Indicated and History Lipid Disorder      Diabetes Screening:     General: Screening Current      Colorectal Cancer Screening:     General: Screening Not Indicated      Cervical Cancer Screening:    General: Screening Not Indicated      Lung Cancer Screening:     General: Screening Not Indicated    Immunizations:  - Immunizations due: Zoster (Shingrix)    Screening, Brief Intervention, and Referral to Treatment (SBIRT)     Screening  Typical number of drinks in a day: 0  Typical number of drinks in a week: 0  Interpretation: Low risk drinking behavior.    Single Item Drug Screening:  How often have you used an illegal drug (including marijuana) or a prescription medication for non-medical reasons in the past year? never    Single Item Drug Screen Score: 0  Interpretation: Negative screen for possible drug use disorder    Social Drivers of Health     Financial Resource Strain: Low Risk  (8/17/2023)    Received from [place]    Financial Resource Strain     Do you have any trouble paying for your medications, or do you think you might in the future?: No   Food Insecurity: No Food Insecurity (7/23/2025)    Nursing - Inadequate Food Risk Classification     Worried About Running Out of Food in the Last Year: Never true     Ran Out of Food in the Last Year: Never true   Transportation Needs: No Transportation Needs (7/23/2025)    PRAPARE - Transportation     Lack of Transportation (Medical): No     Lack of Transportation  "(Non-Medical): No   Housing Stability: Low Risk  (7/23/2025)    Housing Stability Vital Sign     Unable to Pay for Housing in the Last Year: No     Number of Times Moved in the Last Year: 1     Homeless in the Last Year: No   Utilities: Not At Risk (7/23/2025)    [facility] Utilities     Threatened with loss of utilities: No     No results found.    Objective   /74 (BP Location: Right arm, Patient Position: Sitting, Cuff Size: Standard)   Pulse 76   Temp 97.6 °F (36.4 °C) (Temporal)   Ht 5' 3\" (1.6 m)   Wt 75.4 kg (166 lb 2 oz)   SpO2 98%   BMI 29.43 kg/m²     Physical Exam  Vitals and nursing note reviewed.   Constitutional:       General: She is not in acute distress.     Appearance: She is well-developed.   HENT:      Head: Normocephalic and atraumatic.     Eyes:      Conjunctiva/sclera: Conjunctivae normal.       Cardiovascular:      Rate and Rhythm: Normal rate and regular rhythm.      Heart sounds: No murmur heard.  Pulmonary:      Effort: Pulmonary effort is normal. No respiratory distress.      Breath sounds: Normal breath sounds.   Abdominal:      Palpations: Abdomen is soft.      Tenderness: There is no abdominal tenderness.     Musculoskeletal:         General: No swelling.      Cervical back: Neck supple.     Skin:     General: Skin is warm and dry.      Capillary Refill: Capillary refill takes less than 2 seconds.     Neurological:      Mental Status: She is alert.     Psychiatric:         Mood and Affect: Mood normal.         "

## 2025-07-23 NOTE — RESULT ENCOUNTER NOTE
I called and left a detailed VM for Devi regarding her concern about Malgorzata being able to receive B12 injections at her PCP office. I advised her to contact the PCP office to ensure the injections can be administered there. I requested she calls the office back to let us know the outcome.

## 2025-07-30 ENCOUNTER — CLINICAL SUPPORT (OUTPATIENT)
Dept: FAMILY MEDICINE CLINIC | Facility: CLINIC | Age: 85
End: 2025-07-30
Payer: COMMERCIAL

## 2025-07-30 DIAGNOSIS — E53.8 B12 DEFICIENCY: Primary | ICD-10-CM

## 2025-07-30 PROCEDURE — 96372 THER/PROPH/DIAG INJ SC/IM: CPT

## 2025-07-30 RX ORDER — CYANOCOBALAMIN 1000 UG/ML
1000 INJECTION, SOLUTION INTRAMUSCULAR; SUBCUTANEOUS
Status: SHIPPED | OUTPATIENT
Start: 2025-07-30

## 2025-07-30 RX ADMIN — CYANOCOBALAMIN 1000 MCG: 1000 INJECTION, SOLUTION INTRAMUSCULAR; SUBCUTANEOUS at 16:53
